# Patient Record
Sex: MALE | Race: BLACK OR AFRICAN AMERICAN | Employment: OTHER | URBAN - METROPOLITAN AREA
[De-identification: names, ages, dates, MRNs, and addresses within clinical notes are randomized per-mention and may not be internally consistent; named-entity substitution may affect disease eponyms.]

---

## 2017-01-30 DIAGNOSIS — E78.00 HYPERCHOLESTEROLEMIA: ICD-10-CM

## 2017-01-30 DIAGNOSIS — I10 ESSENTIAL HYPERTENSION: ICD-10-CM

## 2017-01-30 RX ORDER — ATENOLOL 25 MG/1
TABLET ORAL
Qty: 90 TAB | Refills: 3 | Status: SHIPPED | OUTPATIENT
Start: 2017-01-30 | End: 2017-04-07

## 2017-01-30 RX ORDER — LISINOPRIL AND HYDROCHLOROTHIAZIDE 20; 25 MG/1; MG/1
TABLET ORAL
Qty: 180 TAB | Refills: 3 | Status: SHIPPED | OUTPATIENT
Start: 2017-01-30 | End: 2017-10-02 | Stop reason: SDUPTHER

## 2017-01-30 RX ORDER — ATORVASTATIN CALCIUM 40 MG/1
TABLET, FILM COATED ORAL
Qty: 90 TAB | Refills: 3 | Status: SHIPPED | OUTPATIENT
Start: 2017-01-30 | End: 2017-10-02 | Stop reason: SDUPTHER

## 2017-01-30 RX ORDER — VERAPAMIL HYDROCHLORIDE 240 MG/1
TABLET, FILM COATED, EXTENDED RELEASE ORAL
Qty: 90 TAB | Refills: 3 | Status: SHIPPED | OUTPATIENT
Start: 2017-01-30 | End: 2017-10-02 | Stop reason: SDUPTHER

## 2017-02-01 ENCOUNTER — OFFICE VISIT (OUTPATIENT)
Dept: INTERNAL MEDICINE CLINIC | Age: 72
End: 2017-02-01

## 2017-02-01 VITALS
HEIGHT: 69 IN | DIASTOLIC BLOOD PRESSURE: 82 MMHG | HEART RATE: 93 BPM | SYSTOLIC BLOOD PRESSURE: 126 MMHG | BODY MASS INDEX: 28.94 KG/M2 | RESPIRATION RATE: 20 BRPM | WEIGHT: 195.4 LBS | OXYGEN SATURATION: 97 % | TEMPERATURE: 98 F

## 2017-02-01 DIAGNOSIS — K62.5 BRBPR (BRIGHT RED BLOOD PER RECTUM): Primary | ICD-10-CM

## 2017-02-01 DIAGNOSIS — K64.9 HEMORRHOIDS, UNSPECIFIED HEMORRHOID TYPE: ICD-10-CM

## 2017-02-01 NOTE — MR AVS SNAPSHOT
Visit Information Date & Time Provider Department Dept. Phone Encounter #  
 2/1/2017  2:00 PM Dariela Bartlett, [address] [place] [address],[address] [phone] [id number] Follow-up Instructions Return if symptoms worsen or fail to improve. Follow-up and Disposition History Your Appointments 4/7/2017  2:00 PM  
ROUTINE CARE with Dariela Bartlett MD  
[facility] [address]) Appt Note: 4 month follow up htn  
 [address] [address]  
[address] [facility] [address] [place] [address] Upcoming Health Maintenance Date Due DTaP/Tdap/Td series (1 - Tdap) 12/19/1965 GLAUCOMA SCREENING Q2Y 12/19/2009 Pneumococcal 65+ High/Highest Risk (2 of 2 - PCV13) 3/8/2014 MEDICARE YEARLY EXAM 12/9/2017 COLONOSCOPY 3/28/2022 Allergies as of 2/1/2017  Review Complete On: 2/1/2017 By: Dariela Bartlett MD  
  
 Severity Noted Reaction Type Reactions Zoloft [Sertraline]  10/20/2009    Other (comments) Insomnia Current Immunizations  Reviewed on 10/1/2012 Name Date Pneumococcal Polysaccharide (PPSV-23) 3/8/2013 Not reviewed this visit You Were Diagnosed With   
  
 Codes Comments BRBPR (bright red blood per rectum)    -  Primary ICD-10-CM: K62.5 ICD-9-CM: 569.3 Hemorrhoids, unspecified hemorrhoid type     ICD-10-CM: K64.9 ICD-9-CM: 337. 6 Vitals BP Pulse Temp Resp Height(growth percentile) Weight(growth percentile) 126/82 (BP 1 Location: Left arm, BP Patient Position: Sitting) 93 98 °F (36.7 °C) (Oral) 20 5' 9\" (1.753 m) 195 lb 6.4 oz (88.6 kg) SpO2 BMI Smoking Status 97% 28.86 kg/m2 Passive Smoke Exposure - Never Smoker Vitals History BMI and BSA Data Body Mass Index Body Surface Area  
 28.86 kg/m 2 2.08 m 2 Preferred Pharmacy Pharmacy Name Phone [facility] [place] [facility], [address] [place] [address] [phone] Your Updated Medication List  
  
   
This list is accurate as of: 2/1/17  2:39 PM.  Always use your most recent med list.  
  
  
  
  
 albuterol 90 mcg/actuation inhaler Commonly known as:  PROVENTIL HFA, VENTOLIN HFA, PROAIR HFA Take 1 Puff by inhalation every four (4) hours as needed for Wheezing. aspirin 81 mg tablet Take  by mouth. atenolol 25 mg tablet Commonly known as:  TENORMIN  
TAKE 1 TABLET EVERY DAY  
  
 atorvastatin 40 mg tablet Commonly known as:  LIPITOR  
TAKE 1 TABLET EVERY NIGHT  
  
 bisacodyl 10 mg suppository Commonly known as:  DULCOLAX (BISACODYL) Insert 10 mg into rectum daily as needed (constipation). cyclobenzaprine 10 mg tablet Commonly known as:  FLEXERIL Take 1 Tab by mouth three (3) times daily as needed for Muscle Spasm(s). HYDROcodone-acetaminophen 5-325 mg per tablet Commonly known as:  Rolinda Doles Take 1 Tab by mouth every six (6) hours as needed for Pain.  
  
 hydrocortisone-pramoxine rectal foam  
Commonly known as:  PROCTOFOAM HC Insert 1 Applicator into rectum two (2) times a day. lisinopril-hydroCHLOROthiazide 20-25 mg per tablet Commonly known as:  PRINZIDE, ZESTORETIC  
TAKE 1 TABLET TWICE DAILY  
  
 meclizine 25 mg tablet Commonly known as:  ANTIVERT Take  by mouth three (3) times daily as needed. MULTIVITAMIN PO Take  by mouth. Omeprazole delayed release 20 mg tablet Commonly known as:  PRILOSEC D/R Take 1 Tab by mouth daily. oxyCODONE-acetaminophen 5-325 mg per tablet Commonly known as:  PERCOCET Take 1 Tab by mouth every four (4) hours as needed for Pain. Max Daily Amount: 6 Tabs. pentoxifylline  mg CR tablet Commonly known as:  TRENtal  
Take 1 Tab by mouth three (3) times daily (with meals). senna 8.6 mg tablet Commonly known as:  Senna Take 1 Tab by mouth daily. verapamil  mg CR tablet Commonly known as:  CALAN-SR  
TAKE 1 TABLET EVERY DAY Prescriptions Sent to Pharmacy Refills  
 hydrocortisone-pramoxine (PROCTOFOAM HC) rectal foam 0 Sig: Insert 1 Applicator into rectum two (2) times a day. Class: Normal  
 Pharmacy: [facility] [place] [facility] #: [phone] Route: Rectal  
  
Follow-up Instructions Return if symptoms worsen or fail to improve. Patient Instructions Hemorrhoids: Care Instructions Your Care Instructions Hemorrhoids are enlarged veins that develop in the anal canal. Bleeding during bowel movements, itching, swelling, and rectal pain are the most common symptoms. They can be uncomfortable at times, but hemorrhoids rarely are a serious problem. You can treat most hemorrhoids with simple changes to your diet and bowel habits. These changes include eating more fiber and not straining to pass stools. Most hemorrhoids do not need surgery or other treatment unless they are very large and painful or bleed a lot. Follow-up care is a key part of your treatment and safety. Be sure to make and go to all appointments, and call your doctor if you are having problems. Its also a good idea to know your test results and keep a list of the medicines you take. How can you care for yourself at home? · Sit in a few inches of warm water (sitz bath) 3 times a day and after bowel movements. The warm water helps with pain and itching. · Put ice on your anal area several times a day for 10 minutes at a time. Put a thin cloth between the ice and your skin. Follow this by placing a warm, wet towel on the area for another 10 to 20 minutes. · Take pain medicines exactly as directed. ¨ If the doctor gave you a prescription medicine for pain, take it as prescribed. ¨ If you are not taking a prescription pain medicine, ask your doctor if you can take an over-the-counter medicine. · Keep the anal area clean, but be gentle. Use water and a fragrance-free soap, such as [place], or use baby wipes or medicated pads, such as Tucks. · Wear cotton underwear and loose clothing to decrease moisture in the anal area. · Eat more fiber. Include foods such as whole-grain breads and cereals, raw vegetables, raw and dried fruits, and beans. · Drink plenty of fluids, enough so that your urine is light yellow or clear like water. If you have kidney, heart, or liver disease and have to limit fluids, talk with your doctor before you increase the amount of fluids you drink. · Use a stool softener that contains bran or psyllium. You can save money by buying bran or psyllium (available in bulk at most health food stores) and sprinkling it on foods or stirring it into fruit juice. Or you can use a product such as Metamucil or Hydrocil. · Practice healthy bowel habits. ¨ Go to the bathroom as soon as you have the urge. ¨ Avoid straining to pass stools. Relax and give yourself time to let things happen naturally. ¨ Do not hold your breath while passing stools. ¨ Do not read while sitting on the toilet. Get off the toilet as soon as you have finished. · Take your medicines exactly as prescribed. Call your doctor if you think you are having a problem with your medicine. When should you call for help? Call 911 anytime you think you may need emergency care. For example, call if: 
· You pass maroon or very bloody stools. Call your doctor now or seek immediate medical care if: 
· You have increased pain. · You have increased bleeding. Watch closely for changes in your health, and be sure to contact your doctor if: 
· Your symptoms have not improved after 3 or 4 days. Where can you learn more? Go to http://main.info/. Enter F228 in the search box to learn more about \"Hemorrhoids: Care Instructions. \" Current as of: August 9, 2016 Content Version: 11.1 © 5551-4427 [employer]. Care instructions adapted under license by [employer] (which disclaims liability or warranty for this information). If you have questions about a medical condition or this instruction, always ask your healthcare professional. [address] any warranty or liability for your use of this information. Introducing [facility]! Dear [place]: Thank you for requesting a RentMatch account. Our records indicate that you already have an active [employer]Match account. You can access your account anytime at [email or web address] Did you know that you can access your hospital and ER discharge instructions at any time in RentMatch? You can also review all of your test results from your hospital stay or ER visit. Additional Information If you have questions, please visit the Frequently Asked Questions section of the RentMatch website at [email or web address] Remember, RentMatch is NOT to be used for urgent needs. For medical emergencies, dial 911. Now available from your iPhone and Android! Please provide this summary of care documentation to your next provider. Your primary care clinician is listed as [place]. If you have any questions after today's visit, please call [phone].

## 2017-02-01 NOTE — PROGRESS NOTES
SUBJECTIVE  Mr. Robyn Martin presents today acutely for     Chief Complaint   Patient presents with    Anal Bleeding     pt here today concerned of blood in stool x 1 week; pt c/o tenderness in stomach       He has bright red blood per rectum. No black or maroon stools, fatigue, LH, etc.   He has a history of hemorrhoid. He's recently had constipation, preceding this. \"And I felt a little something hanging out back there. \"     OBJECTIVE  Visit Vitals    /82 (BP 1 Location: Left arm, BP Patient Position: Sitting)    Pulse 93    Temp 98 °F (36.7 °C) (Oral)    Resp 20    Ht 5' 9\" (1.753 m)    Wt 195 lb 6.4 oz (88.6 kg)    SpO2 97%    BMI 28.86 kg/m2     Gen: Pleasant 66 y.o.  male in NAD.     ABDOMEN: S, +slight umbilical tenderness. Has old scar there from prostatectomy, ND, BS+.   Rectal: Hemorrhoid noted at about 6 o'clock. SKIN: Warm. Dry. No rashes or other lesions noted. ASSESSMENT / PLAN    ICD-10-CM ICD-9-CM    1. BRBPR (bright red blood per rectum) K62.5 569.3    2. Hemorrhoids, unspecified hemorrhoid type K64.9 455.6      Discussed self care. Rx for proctofoam HC. I have reviewed with the patient details of the assessment and plan and all questions were answered. Relevant patient education was performed. Follow-up Disposition:  Return if symptoms worsen or fail to improve.

## 2017-02-01 NOTE — PROGRESS NOTES
1. Have you been to the ER, urgent care clinic since your last visit? Hospitalized since your last visit?no    2. Have you seen or consulted any other health care providers outside of the [address] [place] [address] since your last visit? Include any pap smears or colon screening.  no

## 2017-02-01 NOTE — PATIENT INSTRUCTIONS

## 2017-02-02 ENCOUNTER — TELEPHONE (OUTPATIENT)
Dept: INTERNAL MEDICINE CLINIC | Age: 72
End: 2017-02-02

## 2017-02-02 NOTE — TELEPHONE ENCOUNTER
Suzette Sena called and states that she is needing a call back in regards to if another medication will be able to be called in for the pt today. Please call Suzette Sena.

## 2017-02-02 NOTE — TELEPHONE ENCOUNTER
[facility] pharmacy fax our office requesting a drug change, stating that proctofoam HC 1-1% aero foam 10 gm is not covered; plan alternative include proctozone HC cream; pls escribe if approved.

## 2017-02-03 RX ORDER — HYDROCORTISONE ACETATE, PRAMOXINE HCL 2.5; 1 G/100G; G/100G
CREAM TOPICAL 3 TIMES DAILY
Qty: 10 G | Refills: 0 | Status: SHIPPED | OUTPATIENT
Start: 2017-02-03 | End: 2017-04-07

## 2017-03-31 DIAGNOSIS — I73.9 CLAUDICATION (HCC): ICD-10-CM

## 2017-03-31 RX ORDER — PENTOXIFYLLINE 400 MG/1
TABLET, EXTENDED RELEASE ORAL
Qty: 270 TAB | Refills: 2 | Status: SHIPPED | OUTPATIENT
Start: 2017-03-31 | End: 2017-10-02 | Stop reason: SDUPTHER

## 2017-04-05 LAB
ALBUMIN SERPL-MCNC: 4 G/DL (ref 3.5–4.8)
ALBUMIN/GLOB SERPL: 1.9 {RATIO} (ref 1.2–2.2)
ALP SERPL-CCNC: 52 IU/L (ref 39–117)
ALT SERPL-CCNC: 18 IU/L (ref 0–44)
AST SERPL-CCNC: 14 IU/L (ref 0–40)
BASOPHILS # BLD AUTO: 0.1 X10E3/UL (ref 0–0.2)
BASOPHILS NFR BLD AUTO: 1 %
BILIRUB SERPL-MCNC: 0.5 MG/DL (ref 0–1.2)
BUN SERPL-MCNC: 23 MG/DL (ref 8–27)
BUN/CREAT SERPL: 17 (ref 10–24)
CALCIUM SERPL-MCNC: 9.2 MG/DL (ref 8.6–10.2)
CHLORIDE SERPL-SCNC: 99 MMOL/L (ref 96–106)
CHOLEST SERPL-MCNC: 160 MG/DL (ref 100–199)
CO2 SERPL-SCNC: 23 MMOL/L (ref 18–29)
CREAT SERPL-MCNC: 1.32 MG/DL (ref 0.76–1.27)
EOSINOPHIL # BLD AUTO: 0.4 X10E3/UL (ref 0–0.4)
EOSINOPHIL NFR BLD AUTO: 6 %
ERYTHROCYTE [DISTWIDTH] IN BLOOD BY AUTOMATED COUNT: 14.8 % (ref 12.3–15.4)
EST. AVERAGE GLUCOSE BLD GHB EST-MCNC: 131 MG/DL
GLOBULIN SER CALC-MCNC: 2.1 G/DL (ref 1.5–4.5)
GLUCOSE SERPL-MCNC: 93 MG/DL (ref 65–99)
HBA1C MFR BLD: 6.2 % (ref 4.8–5.6)
HCT VFR BLD AUTO: 40.4 % (ref 37.5–51)
HDLC SERPL-MCNC: 54 MG/DL
HGB BLD-MCNC: 13.5 G/DL (ref 12.6–17.7)
IMM GRANULOCYTES # BLD: 0 X10E3/UL (ref 0–0.1)
IMM GRANULOCYTES NFR BLD: 0 %
LDLC SERPL CALC-MCNC: 85 MG/DL (ref 0–99)
LYMPHOCYTES # BLD AUTO: 2.1 X10E3/UL (ref 0.7–3.1)
LYMPHOCYTES NFR BLD AUTO: 32 %
MCH RBC QN AUTO: 30.1 PG (ref 26.6–33)
MCHC RBC AUTO-ENTMCNC: 33.4 G/DL (ref 31.5–35.7)
MCV RBC AUTO: 90 FL (ref 79–97)
MONOCYTES # BLD AUTO: 0.7 X10E3/UL (ref 0.1–0.9)
MONOCYTES NFR BLD AUTO: 10 %
MORPHOLOGY BLD-IMP: ABNORMAL
NEUTROPHILS # BLD AUTO: 3.3 X10E3/UL (ref 1.4–7)
NEUTROPHILS NFR BLD AUTO: 51 %
PLATELET # BLD AUTO: 59 X10E3/UL (ref 150–379)
POTASSIUM SERPL-SCNC: 4.3 MMOL/L (ref 3.5–5.2)
PROT SERPL-MCNC: 6.1 G/DL (ref 6–8.5)
PSA SERPL-MCNC: <0.1 NG/ML (ref 0–4)
RBC # BLD AUTO: 4.49 X10E6/UL (ref 4.14–5.8)
SODIUM SERPL-SCNC: 139 MMOL/L (ref 134–144)
TRIGL SERPL-MCNC: 104 MG/DL (ref 0–149)
VLDLC SERPL CALC-MCNC: 21 MG/DL (ref 5–40)
WBC # BLD AUTO: 6.5 X10E3/UL (ref 3.4–10.8)

## 2017-04-07 ENCOUNTER — OFFICE VISIT (OUTPATIENT)
Dept: INTERNAL MEDICINE CLINIC | Age: 72
End: 2017-04-07

## 2017-04-07 VITALS
TEMPERATURE: 98 F | WEIGHT: 191.2 LBS | RESPIRATION RATE: 20 BRPM | OXYGEN SATURATION: 96 % | BODY MASS INDEX: 28.32 KG/M2 | SYSTOLIC BLOOD PRESSURE: 124 MMHG | HEIGHT: 69 IN | DIASTOLIC BLOOD PRESSURE: 60 MMHG | HEART RATE: 108 BPM

## 2017-04-07 DIAGNOSIS — D69.6 THROMBOCYTOPENIA (HCC): ICD-10-CM

## 2017-04-07 DIAGNOSIS — I10 ESSENTIAL HYPERTENSION: Primary | ICD-10-CM

## 2017-04-07 DIAGNOSIS — R73.01 IMPAIRED FASTING GLUCOSE: ICD-10-CM

## 2017-04-07 DIAGNOSIS — C61 PROSTATE CANCER (HCC): ICD-10-CM

## 2017-04-07 DIAGNOSIS — E78.00 HYPERCHOLESTEROLEMIA: ICD-10-CM

## 2017-04-07 DIAGNOSIS — J01.90 ACUTE NON-RECURRENT SINUSITIS, UNSPECIFIED LOCATION: ICD-10-CM

## 2017-04-07 RX ORDER — AMOXICILLIN AND CLAVULANATE POTASSIUM 875; 125 MG/1; MG/1
1 TABLET, FILM COATED ORAL EVERY 12 HOURS
Qty: 14 TAB | Refills: 0 | Status: SHIPPED | OUTPATIENT
Start: 2017-04-07 | End: 2017-04-14

## 2017-04-07 NOTE — PROGRESS NOTES
SUBJECTIVE:   Mr. Xochitl Min is a 66 y.o. male who is here for follow up of routine medical issues. Chief Complaint   Patient presents with    Hypertension    Follow-up     pt here today for 4 month f.u    URI     pt c/o pain in L ear and throat x 4 days       His knee is doing fine. He is s/p TKR L knee by Dr. Jesusita Dakin in October. He finished radiation therapy. Seeing Dr. Lindsay Berkowitz for prostate cancer. His rhinitis has been controlled. Zyrtec + mucinex. flonase at night sometimes. Smoking: He has quit. Unfortunately his wife is still smoking. He has had normal colonoscopy with Dr. Moreno Shearer. Constipation is ongoing but better. We mentioned before: He will go once every 5 days, and \"it's a hard stool\", unless he takes senna. \"Is there anything different? \" Takes fiber and drinks a lot of water. Back pain comes and goes--doing okay lately. Dates it to when a [place] door fell on his back in 2000. At this time, he is otherwise doing well and has brought no other complaints to my attention today. For a list of the medical issues addressed today, see the assessment and plan below. PMH:   Past Medical History:   Diagnosis Date    Claudication [facility])     Constipation 10/22/2012    Glaucoma     Hypercholesterolemia     Hypertension     Pancreatitis 10/2012    Prostate cancer [facility])     s/p prostatectomy    Thrombocytopenia ([facility] [address] 7/27/2010       PSH: He has a past surgical history that includes prostatectomy (2005); endoscopy, colon, diagnostic (1999); orthopaedic; and other surgical.  Colonoscopy 2012, by Dr. Moreno Shearer at [facility]--one polyp; Normal in 2017. All: He is allergic to zoloft [sertraline]. MEDS:   Current Outpatient Prescriptions   Medication Sig    PSYLLIUM SEED, WITH SUGAR, (METAMUCIL, SUGAR, PO) Take  by mouth.     pentoxifylline CR (TRENTAL) 400 mg CR tablet TAKE 1 TABLET THREE TIMES DAILY WITH MEALS    atorvastatin (LIPITOR) 40 mg tablet TAKE 1 TABLET EVERY NIGHT  lisinopril-hydroCHLOROthiazide (PRINZIDE, ZESTORETIC) 20-25 mg per tablet TAKE 1 TABLET TWICE DAILY    verapamil ER (CALAN-SR) 240 mg CR tablet TAKE 1 TABLET EVERY DAY    oxyCODONE-acetaminophen (PERCOCET) 5-325 mg per tablet Take 1 Tab by mouth every four (4) hours as needed for Pain. Max Daily Amount: 6 Tabs.  senna (SENNA) 8.6 mg tablet Take 1 Tab by mouth daily.  HYDROcodone-acetaminophen (NORCO) 5-325 mg per tablet Take 1 Tab by mouth every six (6) hours as needed for Pain.  Omeprazole delayed release (PRILOSEC D/R) 20 mg tablet Take 1 Tab by mouth daily.  aspirin 81 mg Tab Take  by mouth.  MULTIVITAMINS (MULTIVITAMIN PO) Take  by mouth.  pramoxine-hydrocortisone (PROTOFOAM-HC) 2.5-1 % topical cream Apply  to affected area three (3) times daily.  atenolol (TENORMIN) 25 mg tablet TAKE 1 TABLET EVERY DAY    albuterol (PROVENTIL HFA, VENTOLIN HFA, PROAIR HFA) 90 mcg/actuation inhaler Take 1 Puff by inhalation every four (4) hours as needed for Wheezing.  cyclobenzaprine (FLEXERIL) 10 mg tablet Take 1 Tab by mouth three (3) times daily as needed for Muscle Spasm(s).  meclizine (ANTIVERT) 25 mg tablet Take  by mouth three (3) times daily as needed.  bisacodyl (DULCOLAX, BISACODYL,) 10 mg suppository Insert 10 mg into rectum daily as needed (constipation). No current facility-administered medications for this visit. FH: His family history includes Cancer in his mother; Heart Disease in his father; Hypertension in his brother, brother, and mother. SH: He is retired  for [employer]. He has quit cigarettes in 2013--resumed--trying to wean down. He has a 25 pack-year smoking history. He does not have any smokeless tobacco history on file. He reports that he drinks alcohol. ROS: See above; Complete ROS otherwise negative.      OBJECTIVE:   Vitals:   Visit Vitals    /60 (BP 1 Location: Left arm, BP Patient Position: Sitting)    Pulse (!) 108    Temp 98 °F (36.7 °C) (Oral)    Resp 20    Ht 5' 9\" (1.753 m)    Wt 191 lb 3.2 oz (86.7 kg)    SpO2 96%    BMI 28.24 kg/m2     Gen: Pleasant 66 y.o. AA male in NAD. HEENT: PERRLA. EOMI. OP moist and pink. TM pearly. Neck: Supple. No LAD. HEART: RRR, No M/G/R.    LUNGS: CTAB No W/R. ABDOMEN: S, NT, ND, BS+. EXTREMITIES: Warm. No C/C/E.  MUSCULOSKELETAL:  Soreness over R achilles tendon. Normal ROM, muscle strength 5/5 all groups. NEURO: Alert and oriented x 3. Cranial nerves grossly intact. No focal sensory or motor deficits noted. SKIN: Warm. Dry. No rashes or other lesions noted. Lab Results   Component Value Date/Time    Sodium 139 04/04/2017 12:00 AM    Potassium 4.3 04/04/2017 12:00 AM    Chloride 99 04/04/2017 12:00 AM    CO2 23 04/04/2017 12:00 AM    Anion gap 5 10/03/2012 04:53 AM    Glucose 93 04/04/2017 12:00 AM    BUN 23 04/04/2017 12:00 AM    Creatinine 1.32 04/04/2017 12:00 AM    BUN/Creatinine ratio 17 04/04/2017 12:00 AM    GFR est AA 62 04/04/2017 12:00 AM    GFR est non-AA 54 04/04/2017 12:00 AM    Calcium 9.2 04/04/2017 12:00 AM    AST (SGOT) 14 04/04/2017 12:00 AM    Alk. phosphatase 52 04/04/2017 12:00 AM    Protein, total 6.1 04/04/2017 12:00 AM    Albumin 4.0 04/04/2017 12:00 AM    Globulin 3.1 10/03/2012 04:53 AM    A-G Ratio 1.9 04/04/2017 12:00 AM    ALT (SGPT) 18 04/04/2017 12:00 AM       Lab Results   Component Value Date/Time    Cholesterol, total 160 04/04/2017 12:00 AM    HDL Cholesterol 54 04/04/2017 12:00 AM    LDL, calculated 85 04/04/2017 12:00 AM    Triglyceride 104 04/04/2017 12:00 AM    CHOL/HDL Ratio 3.6 10/01/2012 06:45 PM       Lab Results   Component Value Date/Time    WBC 6.5 04/04/2017 12:00 AM    HGB 13.5 04/04/2017 12:00 AM    HCT 40.4 04/04/2017 12:00 AM    PLATELET 59 90/97/4691 12:00 AM    MCV 90 04/04/2017 12:00 AM       ASSESSMENT/ PLAN:   1. ?Sinusitis: Pain referred into throat, and L ear. 2. Hypertension: Fine today.    - lisinopril-hydrochlorothiazide (PRINZIDE, ZESTORETIC) 20-25 mg per tablet; Take 2 Tab by mouth daily. - atenolol (TENORMIN) 25 mg tablet; Take 1 Tab by mouth daily. - verapamil SR (CALAN-SR) 240 mg CR tablet; Take 1 Tab by mouth daily.  - METABOLIC PANEL, COMPREHENSIVE  3. Hyperglycemia: Borderline diabetic. Diet and exercise. 4. Claudication / Peripheral Vascular Disease: Stable. - pentoxifylline CR (TRENTAL) 400 mg CR tablet; Take 1 Tab by mouth three (3) times daily (with meals). 5. Hypercholesterolemia: OK for now. - For now, continue lipitor 40. Watch diet and exercise.  - LIPID PANEL  - METABOLIC PANEL, COMPREHENSIVE  6. Thrombocytopenia:  Stable. We discussed meds, and we might stop the trental for a bit and see if this makes any difference (hasn't tried this yet). Has been seen before by Dr. Kathy Garcia. Return to him if gets any lower. - CBC WITH AUTOMATED DIFF  7. Prostate cancer: Follow with urology, Dr. Nohemi Olivia. \"My PSA is going up again. \"  He is s/p prostatectomy. 8. Constipation: Doing fine now. 9. Rhinitis: Recommended antihistamine. 10. Erectile Dysfunction: Doing fine for now. Gets mild HA with cialis--can continue this. Viagra didn't work as well. 11. Smoking: Congratulated on quitting (has a cigarette \"once in a while,\" but has mostly quit). Follow-up Disposition:  Return in about 4 months (around 8/7/2017) for HTN. Declines flu shot.

## 2017-04-07 NOTE — PROGRESS NOTES
1. Have you been to the ER, urgent care clinic since your last visit? Hospitalized since your last visit? NO  2. Have you seen or consulted any other health care providers outside of the [address] [place] [address] since your last visit? Include any pap smears or colon screening.  NO

## 2017-04-07 NOTE — MR AVS SNAPSHOT
Visit Information Date & Time Provider Department Dept. Phone Encounter #  
 4/7/2017  2:00 PM Chayo Dumas, [address] [phone] [id number] Follow-up Instructions Return in about 4 months (around 8/7/2017) for HTN. Follow-up and Disposition History Upcoming Health Maintenance Date Due DTaP/Tdap/Td series (1 - Tdap) 12/19/1965 GLAUCOMA SCREENING Q2Y 12/19/2009 Pneumococcal 65+ High/Highest Risk (2 of 2 - PCV13) 3/8/2014 MEDICARE YEARLY EXAM 12/9/2017 COLONOSCOPY 3/28/2022 Allergies as of 4/7/2017  Review Complete On: 4/7/2017 By: Jim Vargas Severity Noted Reaction Type Reactions Zoloft [Sertraline]  10/20/2009    Other (comments) Insomnia Current Immunizations  Reviewed on 10/1/2012 Name Date Pneumococcal Polysaccharide (PPSV-23) 3/8/2013 Not reviewed this visit You Were Diagnosed With   
  
 Codes Comments Essential hypertension    -  Primary ICD-10-CM: I10 
ICD-9-CM: 401.9 Hypercholesterolemia     ICD-10-CM: E78.00 ICD-9-CM: 272.0 Impaired fasting glucose     ICD-10-CM: R73.01 
ICD-9-CM: 790.21 Prostate cancer [facility])     ICD-10-CM: N26 ICD-9-CM: 165 [facility]     ICD-10-CM: D69.6 ICD-9-CM: 287.5 Acute non-recurrent sinusitis, unspecified location     ICD-10-CM: J01.90 ICD-9-CM: 461.9 Vitals BP Pulse Temp Resp Height(growth percentile) Weight(growth percentile) 124/60 (BP 1 Location: Left arm, BP Patient Position: Sitting) (!) 108 98 °F (36.7 °C) (Oral) 20 5' 9\" (1.753 m) 191 lb 3.2 oz (86.7 kg) SpO2 BMI Smoking Status 96% 28.24 kg/m2 Passive Smoke Exposure - Never Smoker Vitals History BMI and BSA Data Body Mass Index Body Surface Area  
 28.24 kg/m 2 2.05 m 2 Preferred Pharmacy Pharmacy Name Phone  [address] [facility] [address] [phone] Your Updated Medication List  
  
   
This list is accurate as of: 4/7/17  2:22 PM.  Always use your most recent med list.  
  
  
  
  
 amoxicillin-clavulanate 875-125 mg per tablet Commonly known as:  AUGMENTIN Take 1 Tab by mouth every twelve (12) hours for 7 days. aspirin 81 mg tablet Take  by mouth. atorvastatin 40 mg tablet Commonly known as:  LIPITOR  
TAKE 1 TABLET EVERY NIGHT HYDROcodone-acetaminophen 5-325 mg per tablet Commonly known as:  Milinda Copier Take 1 Tab by mouth every six (6) hours as needed for Pain. lisinopril-hydroCHLOROthiazide 20-25 mg per tablet Commonly known as:  PRINZIDE, ZESTORETIC  
TAKE 1 TABLET TWICE DAILY METAMUCIL (SUGAR) PO Take  by mouth. MULTIVITAMIN PO Take  by mouth. Omeprazole delayed release 20 mg tablet Commonly known as:  PRILOSEC D/R Take 1 Tab by mouth daily. oxyCODONE-acetaminophen 5-325 mg per tablet Commonly known as:  PERCOCET Take 1 Tab by mouth every four (4) hours as needed for Pain. Max Daily Amount: 6 Tabs. pentoxifylline  mg CR tablet Commonly known as:  TRENTAL TAKE 1 TABLET THREE TIMES DAILY WITH MEALS  
  
 senna 8.6 mg tablet Commonly known as:  Senna Take 1 Tab by mouth daily. verapamil  mg CR tablet Commonly known as:  CALAN-SR  
TAKE 1 TABLET EVERY DAY Prescriptions Sent to Pharmacy Refills  
 amoxicillin-clavulanate (AUGMENTIN) 875-125 mg per tablet 0 Sig: Take 1 Tab by mouth every twelve (12) hours for 7 days. Class: Normal  
 Pharmacy: [facility] [address] #: [phone] Route: Oral  
  
Follow-up Instructions Return in about 4 months (around 8/7/2017) for HTN. To-Do List   
 07/06/2017 Lab:  CBC WITH AUTOMATED DIFF   
  
 07/06/2017   Lab:  HEMOGLOBIN A1C WITH EAG   
  
 07/06/2017 Lab:  LIPID PANEL   
  
 07/06/2017 Lab:  METABOLIC PANEL, COMPREHENSIVE [facility]! Dear [place]: Thank you for requesting a AppLayer account. Our records indicate that you already have an active AppLayer account. You can access your account anytime at [email or web address] Did you know that you can access your hospital and ER discharge instructions at any time in AppLayer? You can also review all of your test results from your hospital stay or ER visit. Additional Information If you have questions, please visit the Frequently Asked Questions section of the AppLayer website at [email or web address] Remember, AppLayer is NOT to be used for urgent needs. For medical emergencies, dial 911. Now available from your iPhone and Android! Please provide this summary of care documentation to your next provider. Your primary care clinician is listed as [place]. If you have any questions after today's visit, please call [phone].

## 2017-04-10 ENCOUNTER — TELEPHONE (OUTPATIENT)
Dept: INTERNAL MEDICINE CLINIC | Age: 72
End: 2017-04-10

## 2017-04-10 NOTE — TELEPHONE ENCOUNTER
Pt only took two pills from Friday. It bothers pt when he walks and stomach looks swollen. Should he go to the ED? The medication is bothering him. Not throwing up.

## 2017-04-10 NOTE — TELEPHONE ENCOUNTER
Call to pt, ID verified x2. Pt states he took two tablets of abx, experiencing abdominal cramping/discomfort. Pt states he call on call provider who suggested d/c antibiotic and go to ED if he experience fever, N/V. Pt states he has not had any of these symptoms and the cramping improving. Advised pt to eat a bland diet for a day or two, avoid spicy/greasy foods. PT would like to know if there is anything he can take OTC for intermittent abdominal cramping. Sent to provider for review.

## 2017-04-11 NOTE — TELEPHONE ENCOUNTER
Call completed to Maira Gutiérrez, two patient identifiers verified. Patient advised of Dr. Bright Notice recommendations. Crissy Olson reports the patient was seen and treated at [facility] on 04/10/2016.  Patient now needs a referral to see at Charito Nicolas MD, [facility]  [address], [facility], [address] Phone: [phone] Fax: [phone]

## 2017-04-11 NOTE — TELEPHONE ENCOUNTER
He will need to be seen. I don't have any openings that I know of this week since I am covering several other physicians. If this is urgent he may need to go to urgent care.

## 2017-04-12 ENCOUNTER — TELEPHONE (OUTPATIENT)
Dept: INTERNAL MEDICINE CLINIC | Age: 72
End: 2017-04-12

## 2017-04-12 NOTE — TELEPHONE ENCOUNTER
Patients referral has been obtained through [facility] and faxed over to Dr. Chai Diallo office on behalf of the patient.

## 2017-08-14 LAB
ALBUMIN SERPL-MCNC: 4.2 G/DL (ref 3.5–4.8)
ALBUMIN/GLOB SERPL: 2.1 {RATIO} (ref 1.2–2.2)
ALP SERPL-CCNC: 59 IU/L (ref 39–117)
ALT SERPL-CCNC: 22 IU/L (ref 0–44)
AST SERPL-CCNC: 14 IU/L (ref 0–40)
BASOPHILS # BLD AUTO: 0.1 X10E3/UL (ref 0–0.2)
BASOPHILS NFR BLD AUTO: 1 %
BILIRUB SERPL-MCNC: 0.4 MG/DL (ref 0–1.2)
BUN SERPL-MCNC: 27 MG/DL (ref 8–27)
BUN/CREAT SERPL: 19 (ref 10–24)
CALCIUM SERPL-MCNC: 9.4 MG/DL (ref 8.6–10.2)
CHLORIDE SERPL-SCNC: 99 MMOL/L (ref 96–106)
CHOLEST SERPL-MCNC: 172 MG/DL (ref 100–199)
CO2 SERPL-SCNC: 21 MMOL/L (ref 18–29)
CREAT SERPL-MCNC: 1.41 MG/DL (ref 0.76–1.27)
EOSINOPHIL # BLD AUTO: 0.4 X10E3/UL (ref 0–0.4)
EOSINOPHIL NFR BLD AUTO: 6 %
ERYTHROCYTE [DISTWIDTH] IN BLOOD BY AUTOMATED COUNT: 14.4 % (ref 12.3–15.4)
EST. AVERAGE GLUCOSE BLD GHB EST-MCNC: 128 MG/DL
GLOBULIN SER CALC-MCNC: 2 G/DL (ref 1.5–4.5)
GLUCOSE SERPL-MCNC: 101 MG/DL (ref 65–99)
HBA1C MFR BLD: 6.1 % (ref 4.8–5.6)
HCT VFR BLD AUTO: 42.3 % (ref 37.5–51)
HDLC SERPL-MCNC: 58 MG/DL
HGB BLD-MCNC: 13.8 G/DL (ref 12.6–17.7)
IMM GRANULOCYTES # BLD: 0 X10E3/UL (ref 0–0.1)
IMM GRANULOCYTES NFR BLD: 0 %
LDLC SERPL CALC-MCNC: 90 MG/DL (ref 0–99)
LYMPHOCYTES # BLD AUTO: 2 X10E3/UL (ref 0.7–3.1)
LYMPHOCYTES NFR BLD AUTO: 34 %
MCH RBC QN AUTO: 29.6 PG (ref 26.6–33)
MCHC RBC AUTO-ENTMCNC: 32.6 G/DL (ref 31.5–35.7)
MCV RBC AUTO: 91 FL (ref 79–97)
MONOCYTES # BLD AUTO: 0.6 X10E3/UL (ref 0.1–0.9)
MONOCYTES NFR BLD AUTO: 9 %
MORPHOLOGY BLD-IMP: ABNORMAL
NEUTROPHILS # BLD AUTO: 2.9 X10E3/UL (ref 1.4–7)
NEUTROPHILS NFR BLD AUTO: 50 %
PLATELET # BLD AUTO: 60 X10E3/UL (ref 150–379)
POTASSIUM SERPL-SCNC: 4.3 MMOL/L (ref 3.5–5.2)
PROT SERPL-MCNC: 6.2 G/DL (ref 6–8.5)
RBC # BLD AUTO: 4.66 X10E6/UL (ref 4.14–5.8)
SODIUM SERPL-SCNC: 138 MMOL/L (ref 134–144)
TRIGL SERPL-MCNC: 120 MG/DL (ref 0–149)
VLDLC SERPL CALC-MCNC: 24 MG/DL (ref 5–40)
WBC # BLD AUTO: 6 X10E3/UL (ref 3.4–10.8)

## 2017-08-16 ENCOUNTER — OFFICE VISIT (OUTPATIENT)
Dept: INTERNAL MEDICINE CLINIC | Age: 72
End: 2017-08-16

## 2017-08-16 VITALS
DIASTOLIC BLOOD PRESSURE: 67 MMHG | OXYGEN SATURATION: 99 % | HEIGHT: 69 IN | SYSTOLIC BLOOD PRESSURE: 129 MMHG | WEIGHT: 200.2 LBS | RESPIRATION RATE: 16 BRPM | HEART RATE: 97 BPM | BODY MASS INDEX: 29.65 KG/M2 | TEMPERATURE: 98.4 F

## 2017-08-16 DIAGNOSIS — I10 ESSENTIAL HYPERTENSION: Primary | ICD-10-CM

## 2017-08-16 DIAGNOSIS — E78.00 HYPERCHOLESTEROLEMIA: ICD-10-CM

## 2017-08-16 DIAGNOSIS — D69.6 THROMBOCYTOPENIA (HCC): ICD-10-CM

## 2017-08-16 DIAGNOSIS — R73.01 IMPAIRED FASTING GLUCOSE: ICD-10-CM

## 2017-08-16 NOTE — PROGRESS NOTES
SUBJECTIVE:   Mr. Donnell Villareal is a 66 y.o. male who is here for follow up of routine medical issues. Chief Complaint   Patient presents with    Hypertension    Follow-up     pt here today for 4 month f.u       \"I have a little drainage\". His rhinitis has otherwise been controlled. Zyrtec + mucinex \"as needed. \"  Flonase \"made my nose bleed, so I stopped that. \"   Smoking: He has 2-3 cigs / day. Unfortunately his wife is still smoking. He has had normal colonoscopy with Dr. Elda Dan. Constipation is ongoing but better. We mentioned before: He will go once every 5 days, and \"it's a hard stool\", unless he takes senna. \"Is there anything different? \" Takes fiber and drinks a lot of water. He finished radiation therapy. Seeing Dr. Evelina Jackson for prostate cancer. Back pain comes and goes--doing okay lately. Dates it to when a Shelbi Rubins door fell on his back in 2000. At this time, he is otherwise doing well and has brought no other complaints to my attention today. For a list of the medical issues addressed today, see the assessment and plan below. PMH:   Past Medical History:   Diagnosis Date    Claudication [facility])     Constipation 10/22/2012    Glaucoma     Hypercholesterolemia     Hypertension     Pancreatitis 10/2012    Prostate cancer [facility])     s/p prostatectomy    Thrombocytopenia ([facility] [address] 7/27/2010       PSH: He has a past surgical history that includes prostatectomy (2005); endoscopy, colon, diagnostic (1999); orthopaedic; other surgical; and hernia repair (04/2017). Colonoscopy 2012, by Dr. Elda Dan at [facility]--one polyp; Normal in 2017. All: He is allergic to zoloft [sertraline]. MEDS:   Current Outpatient Prescriptions   Medication Sig    PSYLLIUM SEED, WITH SUGAR, (METAMUCIL, SUGAR, PO) Take  by mouth.     pentoxifylline CR (TRENTAL) 400 mg CR tablet TAKE 1 TABLET THREE TIMES DAILY WITH MEALS    atorvastatin (LIPITOR) 40 mg tablet TAKE 1 TABLET EVERY NIGHT    lisinopril-hydroCHLOROthiazide (PRINZIDE, ZESTORETIC) 20-25 mg per tablet TAKE 1 TABLET TWICE DAILY    verapamil ER (CALAN-SR) 240 mg CR tablet TAKE 1 TABLET EVERY DAY    oxyCODONE-acetaminophen (PERCOCET) 5-325 mg per tablet Take 1 Tab by mouth every four (4) hours as needed for Pain. Max Daily Amount: 6 Tabs.  senna (SENNA) 8.6 mg tablet Take 1 Tab by mouth daily.  HYDROcodone-acetaminophen (NORCO) 5-325 mg per tablet Take 1 Tab by mouth every six (6) hours as needed for Pain.  Omeprazole delayed release (PRILOSEC D/R) 20 mg tablet Take 1 Tab by mouth daily.  aspirin 81 mg Tab Take  by mouth.  MULTIVITAMINS (MULTIVITAMIN PO) Take  by mouth. No current facility-administered medications for this visit. FH: His family history includes Cancer in his mother; Heart Disease in his father; Hypertension in his brother, brother, and mother. SH: He is retired  for [employer]. He has quit cigarettes in 2013--resumed--trying to wean down. He has a 25 pack-year smoking history. He does not have any smokeless tobacco history on file. He reports that he drinks alcohol. ROS: See above; Complete ROS otherwise negative. OBJECTIVE:   Vitals:   Visit Vitals    /67 (BP 1 Location: Left arm, BP Patient Position: Sitting)    Pulse 97    Temp 98.4 °F (36.9 °C) (Oral)    Resp 16    Ht 5' 9\" (1.753 m)    Wt 200 lb 3.2 oz (90.8 kg)    SpO2 99%    BMI 29.56 kg/m2     Gen: Pleasant 66 y.o. AA male in NAD. HEENT: PERRLA. EOMI. OP moist and pink. TM pearly. Neck: Supple. No LAD. HEART: RRR, No M/G/R.    LUNGS: CTAB No W/R. ABDOMEN: S, NT, ND, BS+. EXTREMITIES: Warm. No C/C/E.  MUSCULOSKELETAL:  Soreness over R achilles tendon. Normal ROM, muscle strength 5/5 all groups. NEURO: Alert and oriented x 3. Cranial nerves grossly intact. No focal sensory or motor deficits noted. SKIN: Warm. Dry. No rashes or other lesions noted.     Lab Results   Component Value Date/Time    Sodium 138 08/12/2017 08:46 AM    Potassium 4.3 08/12/2017 08:46 AM    Chloride 99 08/12/2017 08:46 AM    CO2 21 08/12/2017 08:46 AM    Anion gap 5 10/03/2012 04:53 AM    Glucose 101 08/12/2017 08:46 AM    BUN 27 08/12/2017 08:46 AM    Creatinine 1.41 08/12/2017 08:46 AM    BUN/Creatinine ratio 19 08/12/2017 08:46 AM    GFR est AA 57 08/12/2017 08:46 AM    GFR est non-AA 49 08/12/2017 08:46 AM    Calcium 9.4 08/12/2017 08:46 AM    AST (SGOT) 14 08/12/2017 08:46 AM    Alk. phosphatase 59 08/12/2017 08:46 AM    Protein, total 6.2 08/12/2017 08:46 AM    Albumin 4.2 08/12/2017 08:46 AM    Globulin 3.1 10/03/2012 04:53 AM    A-G Ratio 2.1 08/12/2017 08:46 AM    ALT (SGPT) 22 08/12/2017 08:46 AM       Lab Results   Component Value Date/Time    Cholesterol, total 172 08/12/2017 08:46 AM    HDL Cholesterol 58 08/12/2017 08:46 AM    LDL, calculated 90 08/12/2017 08:46 AM    Triglyceride 120 08/12/2017 08:46 AM    CHOL/HDL Ratio 3.6 10/01/2012 06:45 PM       Lab Results   Component Value Date/Time    WBC 6.0 08/12/2017 08:46 AM    HGB 13.8 08/12/2017 08:46 AM    HCT 42.3 08/12/2017 08:46 AM    PLATELET 60 52/25/4955 08:46 AM    MCV 91 08/12/2017 08:46 AM       ASSESSMENT/ PLAN:     1. Hypertension: Fine today. - lisinopril-hydrochlorothiazide (PRINZIDE, ZESTORETIC) 20-25 mg per tablet; Take 2 Tab by mouth daily. - atenolol (TENORMIN) 25 mg tablet; Take 1 Tab by mouth daily. - verapamil SR (CALAN-SR) 240 mg CR tablet; Take 1 Tab by mouth daily.  - METABOLIC PANEL, COMPREHENSIVE  2. Hyperglycemia: Borderline diabetic. Diet and exercise. 3. Claudication / Peripheral Vascular Disease: Stable. - pentoxifylline CR (TRENTAL) 400 mg CR tablet; Take 1 Tab by mouth three (3) times daily (with meals). 4. Hypercholesterolemia: OK for now. - For now, continue lipitor 40. Watch diet and exercise.  - LIPID PANEL  - METABOLIC PANEL, COMPREHENSIVE  5. Thrombocytopenia:  Stable.  We discussed meds, and we might stop the trental for a bit and see if this makes any difference (hasn't tried this yet). Has been seen before by Dr. Leigh Ann Conway. Return to him if gets any lower. - CBC WITH AUTOMATED DIFF  6. Prostate cancer: Follow with urology, Dr. Evelina Jackson. \"My PSA is going up again. \"  He is s/p prostatectomy. 7. Constipation: Doing fine now. 8. Rhinitis: Recommended antihistamine. 9. Erectile Dysfunction: Doing fine for now. Gets mild HA with cialis--can continue this. Viagra didn't work as well. 10. Smoking: Congratulated on quitting (has a cigarette \"once in a while,\" but has mostly quit). Follow-up Disposition:  Return in about 4 months (around 12/16/2017) for HTN. Declines flu shot.

## 2017-08-16 NOTE — MR AVS SNAPSHOT
Visit Information Date & Time Provider Department Dept. Phone Encounter #  
 8/16/2017 11:15 AM Rito Campbell, [address] [facility] [id number] Follow-up Instructions Return in about 4 months (around 12/16/2017) for HTN. Follow-up and Disposition History Upcoming Health Maintenance Date Due DTaP/Tdap/Td series (1 - Tdap) 12/19/1965 Pneumococcal 65+ High/Highest Risk (2 of 2 - PCV13) 3/8/2014 INFLUENZA AGE 9 TO ADULT 8/1/2017 MEDICARE YEARLY EXAM 12/9/2017 GLAUCOMA SCREENING Q2Y 7/12/2019 COLONOSCOPY 3/28/2022 Allergies as of 8/16/2017  Review Complete On: 8/16/2017 By: Rito Campbell MD  
  
 Severity Noted Reaction Type Reactions Zoloft [Sertraline]  10/20/2009    Other (comments) Insomnia Current Immunizations  Reviewed on 10/1/2012 Name Date Pneumococcal Polysaccharide (PPSV-23) 3/8/2013 Not reviewed this visit You Were Diagnosed With   
  
 Codes Comments Essential hypertension    -  Primary ICD-10-CM: I10 
ICD-9-CM: 401.9 Hypercholesterolemia     ICD-10-CM: E78.00 ICD-9-CM: 272.0 Impaired fasting glucose     ICD-10-CM: R73.01 
ICD-9-CM: 790.21 Thrombocytopenia ([address]     ICD-10-CM: D69.6 ICD-9-CM: 287.5 Vitals BP Pulse Temp Resp Height(growth percentile) Weight(growth percentile) 129/67 (BP 1 Location: Left arm, BP Patient Position: Sitting) 97 98.4 °F (36.9 °C) (Oral) 16 5' 9\" (1.753 m) 200 lb 3.2 oz (90.8 kg) SpO2 BMI Smoking Status 99% 29.56 kg/m2 Passive Smoke Exposure - Never Smoker Vitals History BMI and BSA Data Body Mass Index Body Surface Area  
 29.56 kg/m 2 2.1 m 2 Preferred Pharmacy Pharmacy Name Phone [facility], [address] [place] [address] [phone] Your Updated Medication List  
  
   
This list is accurate as of: 8/16/17 11:50 AM.  Always use your most recent med list.  
  
  
  
  
 aspirin 81 mg tablet Take  by mouth. atorvastatin 40 mg tablet Commonly known as:  LIPITOR  
TAKE 1 TABLET EVERY NIGHT HYDROcodone-acetaminophen 5-325 mg per tablet Commonly known as:  Hurman Grist Take 1 Tab by mouth every six (6) hours as needed for Pain. lisinopril-hydroCHLOROthiazide 20-25 mg per tablet Commonly known as:  PRINZIDE, ZESTORETIC  
TAKE 1 TABLET TWICE DAILY METAMUCIL (SUGAR) PO Take  by mouth. MULTIVITAMIN PO Take  by mouth. Omeprazole delayed release 20 mg tablet Commonly known as:  PRILOSEC D/R Take 1 Tab by mouth daily. oxyCODONE-acetaminophen 5-325 mg per tablet Commonly known as:  PERCOCET Take 1 Tab by mouth every four (4) hours as needed for Pain. Max Daily Amount: 6 Tabs. pentoxifylline  mg CR tablet Commonly known as:  TRENTAL TAKE 1 TABLET THREE TIMES DAILY WITH MEALS  
  
 senna 8.6 mg tablet Commonly known as:  Senna Take 1 Tab by mouth daily. verapamil  mg CR tablet Commonly known as:  CALAN-SR  
TAKE 1 TABLET EVERY DAY Follow-up Instructions Return in about 4 months (around 12/16/2017) for HTN. To-Do List   
 11/17/2017 Lab:  CBC WITH AUTOMATED DIFF   
  
 11/17/2017 Lab:  HEMOGLOBIN A1C WITH EAG   
  
 11/17/2017 Lab:  LIPID PANEL   
  
 11/17/2017 Lab:  METABOLIC PANEL, COMPREHENSIVE [facility]! Dear Sue Heredia: Thank you for requesting a [employer] account. Our records indicate that you already have an active [employer] account. You can access your account anytime at [email or web address] Did you know that you can access your hospital and ER discharge instructions at any time in [employer]? You can also review all of your test results from your hospital stay or ER visit. Additional Information If you have questions, please visit the Frequently Asked Questions section of the [employer] website at [email or web address] Remember, [employer] is NOT to be used for urgent needs. For medical emergencies, dial 911. Now available from your iPhone and Android! Please provide this summary of care documentation to your next provider. Your primary care clinician is listed as [place]. If you have any questions after today's visit, please call [phone].

## 2017-08-16 NOTE — PROGRESS NOTES
1. Have you been to the ER, urgent care clinic since your last visit? Hospitalized since your last visit?no    2. Have you seen or consulted any other health care providers outside of the [address] [place] [address] since your last visit? Include any pap smears or colon screening.  no

## 2017-10-02 DIAGNOSIS — E78.00 HYPERCHOLESTEROLEMIA: ICD-10-CM

## 2017-10-02 DIAGNOSIS — I73.9 CLAUDICATION (HCC): ICD-10-CM

## 2017-10-02 DIAGNOSIS — I10 ESSENTIAL HYPERTENSION: ICD-10-CM

## 2017-10-03 RX ORDER — VERAPAMIL HYDROCHLORIDE 240 MG/1
TABLET, FILM COATED, EXTENDED RELEASE ORAL
Qty: 90 TAB | Refills: 3 | Status: SHIPPED | OUTPATIENT
Start: 2017-10-03

## 2017-10-03 RX ORDER — LISINOPRIL AND HYDROCHLOROTHIAZIDE 20; 25 MG/1; MG/1
TABLET ORAL
Qty: 180 TAB | Refills: 3 | Status: SHIPPED | OUTPATIENT
Start: 2017-10-03

## 2017-10-03 RX ORDER — PENTOXIFYLLINE 400 MG/1
TABLET, EXTENDED RELEASE ORAL
Qty: 270 TAB | Refills: 2 | Status: ON HOLD | OUTPATIENT
Start: 2017-10-03 | End: 2022-01-01

## 2017-10-03 RX ORDER — ATORVASTATIN CALCIUM 40 MG/1
TABLET, FILM COATED ORAL
Qty: 90 TAB | Refills: 3 | Status: SHIPPED | OUTPATIENT
Start: 2017-10-03

## 2018-01-23 ENCOUNTER — TELEPHONE (OUTPATIENT)
Dept: INTERNAL MEDICINE CLINIC | Age: 73
End: 2018-01-23

## 2018-01-23 NOTE — TELEPHONE ENCOUNTER
Patients wife called and is asking for his medical records to be sent to [facility].  Patients wife states that she has called here 3 times requesting that we fax them and that the office has requested them several times. Per patients chart, I see where the other office has faxed us x1 which has been sent to [facility] for records because they are requesting the last 3 years of his medical records from us. I personally faxed last office note, labs, and snapshot with patients HM listed. [email or web address] received request on 1/15.

## 2018-07-31 ENCOUNTER — HOSPITAL ENCOUNTER (OUTPATIENT)
Dept: ULTRASOUND IMAGING | Age: 73
Discharge: HOME | End: 2018-07-31
Attending: INTERNAL MEDICINE
Payer: MEDICARE

## 2018-07-31 DIAGNOSIS — D69.6 THROMBOCYTOPENIA, UNSPECIFIED (HCC): ICD-10-CM

## 2018-07-31 PROCEDURE — 76705 ECHO EXAM OF ABDOMEN: CPT

## 2022-01-01 ENCOUNTER — TELEPHONE (OUTPATIENT)
Dept: ONCOLOGY | Age: 77
End: 2022-01-01

## 2022-01-01 ENCOUNTER — APPOINTMENT (OUTPATIENT)
Dept: INFUSION THERAPY | Age: 77
End: 2022-01-01

## 2022-01-01 ENCOUNTER — HOSPITAL ENCOUNTER (OUTPATIENT)
Dept: INFUSION THERAPY | Age: 77
Discharge: HOME | End: 2022-08-02
Payer: MEDICARE

## 2022-01-01 ENCOUNTER — HOSPITAL ENCOUNTER (OUTPATIENT)
Dept: INFUSION THERAPY | Age: 77
Discharge: HOME | End: 2022-06-07
Payer: MEDICARE

## 2022-01-01 ENCOUNTER — TELEPHONE (OUTPATIENT)
Dept: PALLATIVE CARE | Age: 77
End: 2022-01-01

## 2022-01-01 ENCOUNTER — OFFICE VISIT (OUTPATIENT)
Dept: ONCOLOGY | Age: 77
End: 2022-01-01
Payer: MEDICARE

## 2022-01-01 ENCOUNTER — HOSPITAL ENCOUNTER (EMERGENCY)
Dept: CT IMAGING | Age: 77
Discharge: HOME | End: 2022-08-07
Attending: EMERGENCY MEDICINE
Payer: MEDICARE

## 2022-01-01 ENCOUNTER — HOSPITAL ENCOUNTER (OUTPATIENT)
Age: 77
Setting detail: OUTPATIENT SURGERY
Discharge: HOME | End: 2022-05-06
Attending: INTERNAL MEDICINE | Admitting: INTERNAL MEDICINE
Payer: MEDICARE

## 2022-01-01 ENCOUNTER — ANESTHESIA EVENT (OUTPATIENT)
Dept: ENDOSCOPY | Age: 77
End: 2022-01-01
Payer: MEDICARE

## 2022-01-01 ENCOUNTER — APPOINTMENT (OUTPATIENT)
Dept: GENERAL RADIOLOGY | Age: 77
DRG: 436 | End: 2022-01-01
Attending: SURGERY
Payer: MEDICARE

## 2022-01-01 ENCOUNTER — HOSPITAL ENCOUNTER (OUTPATIENT)
Dept: INFUSION THERAPY | Age: 77
Discharge: HOME | End: 2022-08-18
Payer: MEDICARE

## 2022-01-01 ENCOUNTER — HOSPITAL ENCOUNTER (OUTPATIENT)
Dept: CT IMAGING | Age: 77
Discharge: HOME | End: 2022-07-28
Attending: NURSE PRACTITIONER
Payer: MEDICARE

## 2022-01-01 ENCOUNTER — HOSPITAL ENCOUNTER (OUTPATIENT)
Dept: INFUSION THERAPY | Age: 77
Discharge: HOME | End: 2022-06-21
Payer: MEDICARE

## 2022-01-01 ENCOUNTER — HOSPITAL ENCOUNTER (OUTPATIENT)
Dept: INFUSION THERAPY | Age: 77
Discharge: HOME | End: 2022-07-05
Payer: MEDICARE

## 2022-01-01 ENCOUNTER — PATIENT MESSAGE (OUTPATIENT)
Dept: ONCOLOGY | Age: 77
End: 2022-01-01

## 2022-01-01 ENCOUNTER — APPOINTMENT (OUTPATIENT)
Dept: CT IMAGING | Age: 77
DRG: 436 | End: 2022-01-01
Attending: EMERGENCY MEDICINE
Payer: MEDICARE

## 2022-01-01 ENCOUNTER — HOSPITAL ENCOUNTER (OUTPATIENT)
Dept: INFUSION THERAPY | Age: 77
End: 2022-01-01
Payer: MEDICARE

## 2022-01-01 ENCOUNTER — DOCUMENTATION ONLY (OUTPATIENT)
Dept: ONCOLOGY | Age: 77
End: 2022-01-01

## 2022-01-01 ENCOUNTER — APPOINTMENT (OUTPATIENT)
Dept: GENERAL RADIOLOGY | Age: 77
DRG: 436 | End: 2022-01-01
Attending: NURSE PRACTITIONER
Payer: MEDICARE

## 2022-01-01 ENCOUNTER — APPOINTMENT (OUTPATIENT)
Dept: GENERAL RADIOLOGY | Age: 77
DRG: 436 | End: 2022-01-01
Attending: INTERNAL MEDICINE
Payer: MEDICARE

## 2022-01-01 ENCOUNTER — ANESTHESIA (OUTPATIENT)
Dept: ENDOSCOPY | Age: 77
End: 2022-01-01
Payer: MEDICARE

## 2022-01-01 ENCOUNTER — HOSPITAL ENCOUNTER (INPATIENT)
Age: 77
LOS: 6 days | DRG: 436 | End: 2022-09-12
Attending: EMERGENCY MEDICINE | Admitting: STUDENT IN AN ORGANIZED HEALTH CARE EDUCATION/TRAINING PROGRAM
Payer: MEDICARE

## 2022-01-01 ENCOUNTER — TRANSCRIBE ORDER (OUTPATIENT)
Dept: SCHEDULING | Age: 77
End: 2022-01-01

## 2022-01-01 ENCOUNTER — HOSPITAL ENCOUNTER (OUTPATIENT)
Dept: INFUSION THERAPY | Age: 77
Discharge: HOME | End: 2022-08-30
Payer: MEDICARE

## 2022-01-01 ENCOUNTER — HOSPITAL ENCOUNTER (OUTPATIENT)
Dept: CT IMAGING | Age: 77
Discharge: HOME | End: 2022-08-11
Attending: REGISTERED NURSE
Payer: MEDICARE

## 2022-01-01 ENCOUNTER — HOSPITAL ENCOUNTER (OUTPATIENT)
Dept: INFUSION THERAPY | Age: 77
Discharge: HOME | End: 2022-07-19
Payer: MEDICARE

## 2022-01-01 ENCOUNTER — HOSPITAL ENCOUNTER (OUTPATIENT)
Dept: INTERVENTIONAL RADIOLOGY/VASCULAR | Age: 77
Discharge: HOME | End: 2022-06-03
Attending: STUDENT IN AN ORGANIZED HEALTH CARE EDUCATION/TRAINING PROGRAM | Admitting: STUDENT IN AN ORGANIZED HEALTH CARE EDUCATION/TRAINING PROGRAM
Payer: MEDICARE

## 2022-01-01 ENCOUNTER — HOSPITAL ENCOUNTER (OUTPATIENT)
Dept: INFUSION THERAPY | Age: 77
Discharge: HOME | End: 2022-08-16
Payer: MEDICARE

## 2022-01-01 ENCOUNTER — HOSPITAL ENCOUNTER (OUTPATIENT)
Dept: MRI IMAGING | Age: 77
Discharge: HOME | End: 2022-04-28
Attending: INTERNAL MEDICINE
Payer: MEDICARE

## 2022-01-01 ENCOUNTER — HOSPITAL ENCOUNTER (OUTPATIENT)
Dept: PET IMAGING | Age: 77
Discharge: HOME | End: 2022-05-19
Attending: INTERNAL MEDICINE
Payer: MEDICARE

## 2022-01-01 ENCOUNTER — HOSPITAL ENCOUNTER (OUTPATIENT)
Dept: INFUSION THERAPY | Age: 77
Discharge: HOME | End: 2022-09-01
Payer: MEDICARE

## 2022-01-01 ENCOUNTER — HOSPITAL ENCOUNTER (EMERGENCY)
Age: 77
Discharge: HOME | End: 2022-08-07
Attending: EMERGENCY MEDICINE
Payer: MEDICARE

## 2022-01-01 ENCOUNTER — APPOINTMENT (OUTPATIENT)
Dept: CT IMAGING | Age: 77
DRG: 436 | End: 2022-01-01
Attending: PHYSICIAN ASSISTANT
Payer: MEDICARE

## 2022-01-01 ENCOUNTER — APPOINTMENT (OUTPATIENT)
Dept: CT IMAGING | Age: 77
End: 2022-01-01
Attending: NURSE PRACTITIONER
Payer: MEDICARE

## 2022-01-01 ENCOUNTER — HOSPITAL ENCOUNTER (OUTPATIENT)
Dept: INFUSION THERAPY | Age: 77
Discharge: HOME | End: 2022-06-16
Payer: MEDICARE

## 2022-01-01 ENCOUNTER — APPOINTMENT (OUTPATIENT)
Dept: ULTRASOUND IMAGING | Age: 77
End: 2022-01-01
Attending: INTERNAL MEDICINE
Payer: MEDICARE

## 2022-01-01 ENCOUNTER — APPOINTMENT (OUTPATIENT)
Dept: GENERAL RADIOLOGY | Age: 77
DRG: 436 | End: 2022-01-01
Attending: PHYSICIAN ASSISTANT
Payer: MEDICARE

## 2022-01-01 VITALS
TEMPERATURE: 97.8 F | DIASTOLIC BLOOD PRESSURE: 75 MMHG | SYSTOLIC BLOOD PRESSURE: 117 MMHG | HEART RATE: 87 BPM | OXYGEN SATURATION: 98 %

## 2022-01-01 VITALS
TEMPERATURE: 97.3 F | OXYGEN SATURATION: 97 % | BODY MASS INDEX: 23.48 KG/M2 | DIASTOLIC BLOOD PRESSURE: 86 MMHG | HEIGHT: 70 IN | SYSTOLIC BLOOD PRESSURE: 129 MMHG | HEART RATE: 102 BPM | WEIGHT: 164 LBS

## 2022-01-01 VITALS
SYSTOLIC BLOOD PRESSURE: 114 MMHG | WEIGHT: 164 LBS | RESPIRATION RATE: 18 BRPM | OXYGEN SATURATION: 98 % | HEART RATE: 103 BPM | DIASTOLIC BLOOD PRESSURE: 69 MMHG | TEMPERATURE: 98.2 F | BODY MASS INDEX: 23.53 KG/M2

## 2022-01-01 VITALS
RESPIRATION RATE: 18 BRPM | TEMPERATURE: 97.1 F | DIASTOLIC BLOOD PRESSURE: 53 MMHG | SYSTOLIC BLOOD PRESSURE: 95 MMHG | HEART RATE: 80 BPM

## 2022-01-01 VITALS
SYSTOLIC BLOOD PRESSURE: 118 MMHG | TEMPERATURE: 98.6 F | RESPIRATION RATE: 18 BRPM | BODY MASS INDEX: 21.1 KG/M2 | HEIGHT: 70 IN | HEART RATE: 89 BPM | DIASTOLIC BLOOD PRESSURE: 65 MMHG | WEIGHT: 147.4 LBS | OXYGEN SATURATION: 100 %

## 2022-01-01 VITALS
HEIGHT: 70 IN | TEMPERATURE: 98.7 F | DIASTOLIC BLOOD PRESSURE: 78 MMHG | OXYGEN SATURATION: 95 % | SYSTOLIC BLOOD PRESSURE: 125 MMHG | WEIGHT: 154 LBS | BODY MASS INDEX: 22.05 KG/M2 | HEART RATE: 90 BPM | RESPIRATION RATE: 18 BRPM

## 2022-01-01 VITALS
HEART RATE: 98 BPM | OXYGEN SATURATION: 98 % | TEMPERATURE: 98 F | DIASTOLIC BLOOD PRESSURE: 65 MMHG | RESPIRATION RATE: 16 BRPM | SYSTOLIC BLOOD PRESSURE: 104 MMHG

## 2022-01-01 VITALS
OXYGEN SATURATION: 95 % | RESPIRATION RATE: 18 BRPM | DIASTOLIC BLOOD PRESSURE: 68 MMHG | HEART RATE: 111 BPM | SYSTOLIC BLOOD PRESSURE: 118 MMHG | TEMPERATURE: 98.7 F | WEIGHT: 154 LBS | BODY MASS INDEX: 22.1 KG/M2

## 2022-01-01 VITALS
BODY MASS INDEX: 22.9 KG/M2 | HEART RATE: 105 BPM | RESPIRATION RATE: 17 BRPM | DIASTOLIC BLOOD PRESSURE: 76 MMHG | WEIGHT: 160 LBS | HEIGHT: 70 IN | SYSTOLIC BLOOD PRESSURE: 126 MMHG

## 2022-01-01 VITALS
SYSTOLIC BLOOD PRESSURE: 127 MMHG | DIASTOLIC BLOOD PRESSURE: 70 MMHG | WEIGHT: 152 LBS | BODY MASS INDEX: 21.76 KG/M2 | RESPIRATION RATE: 16 BRPM | TEMPERATURE: 98.5 F | HEART RATE: 79 BPM | HEIGHT: 70 IN

## 2022-01-01 VITALS
RESPIRATION RATE: 18 BRPM | DIASTOLIC BLOOD PRESSURE: 58 MMHG | TEMPERATURE: 97.8 F | WEIGHT: 145 LBS | BODY MASS INDEX: 20.81 KG/M2 | OXYGEN SATURATION: 99 % | SYSTOLIC BLOOD PRESSURE: 124 MMHG | HEART RATE: 76 BPM

## 2022-01-01 VITALS
HEART RATE: 93 BPM | DIASTOLIC BLOOD PRESSURE: 78 MMHG | OXYGEN SATURATION: 98 % | BODY MASS INDEX: 23.48 KG/M2 | HEIGHT: 70 IN | RESPIRATION RATE: 14 BRPM | WEIGHT: 164 LBS | SYSTOLIC BLOOD PRESSURE: 128 MMHG

## 2022-01-01 VITALS
TEMPERATURE: 98.6 F | HEART RATE: 100 BPM | DIASTOLIC BLOOD PRESSURE: 58 MMHG | RESPIRATION RATE: 18 BRPM | BODY MASS INDEX: 21.09 KG/M2 | WEIGHT: 147 LBS | SYSTOLIC BLOOD PRESSURE: 115 MMHG | OXYGEN SATURATION: 100 %

## 2022-01-01 VITALS
WEIGHT: 160 LBS | TEMPERATURE: 99.8 F | BODY MASS INDEX: 22.9 KG/M2 | SYSTOLIC BLOOD PRESSURE: 161 MMHG | RESPIRATION RATE: 21 BRPM | HEART RATE: 99 BPM | HEIGHT: 70 IN | DIASTOLIC BLOOD PRESSURE: 74 MMHG | OXYGEN SATURATION: 99 %

## 2022-01-01 VITALS
TEMPERATURE: 97.3 F | RESPIRATION RATE: 18 BRPM | SYSTOLIC BLOOD PRESSURE: 164 MMHG | DIASTOLIC BLOOD PRESSURE: 80 MMHG | HEART RATE: 88 BPM

## 2022-01-01 VITALS
RESPIRATION RATE: 17 BRPM | HEART RATE: 117 BPM | DIASTOLIC BLOOD PRESSURE: 74 MMHG | TEMPERATURE: 97.7 F | SYSTOLIC BLOOD PRESSURE: 119 MMHG

## 2022-01-01 VITALS — HEIGHT: 70 IN | WEIGHT: 151 LBS | RESPIRATION RATE: 18 BRPM | HEART RATE: 115 BPM | BODY MASS INDEX: 21.62 KG/M2

## 2022-01-01 VITALS
TEMPERATURE: 97.8 F | SYSTOLIC BLOOD PRESSURE: 128 MMHG | BODY MASS INDEX: 20.76 KG/M2 | HEART RATE: 93 BPM | WEIGHT: 145 LBS | RESPIRATION RATE: 18 BRPM | DIASTOLIC BLOOD PRESSURE: 67 MMHG | HEIGHT: 70 IN

## 2022-01-01 VITALS
DIASTOLIC BLOOD PRESSURE: 64 MMHG | TEMPERATURE: 97.8 F | RESPIRATION RATE: 16 BRPM | HEART RATE: 100 BPM | OXYGEN SATURATION: 99 % | SYSTOLIC BLOOD PRESSURE: 120 MMHG

## 2022-01-01 VITALS
RESPIRATION RATE: 18 BRPM | HEART RATE: 105 BPM | TEMPERATURE: 97.1 F | WEIGHT: 140 LBS | SYSTOLIC BLOOD PRESSURE: 99 MMHG | BODY MASS INDEX: 20.09 KG/M2 | DIASTOLIC BLOOD PRESSURE: 64 MMHG | OXYGEN SATURATION: 100 %

## 2022-01-01 VITALS
SYSTOLIC BLOOD PRESSURE: 136 MMHG | DIASTOLIC BLOOD PRESSURE: 54 MMHG | BODY MASS INDEX: 21.7 KG/M2 | WEIGHT: 151.6 LBS | RESPIRATION RATE: 18 BRPM | HEART RATE: 72 BPM | TEMPERATURE: 97.5 F | HEIGHT: 70 IN

## 2022-01-01 VITALS
WEIGHT: 140 LBS | HEART RATE: 66 BPM | RESPIRATION RATE: 28 BRPM | OXYGEN SATURATION: 100 % | DIASTOLIC BLOOD PRESSURE: 67 MMHG | BODY MASS INDEX: 20.04 KG/M2 | HEIGHT: 70 IN | SYSTOLIC BLOOD PRESSURE: 111 MMHG | TEMPERATURE: 97.5 F

## 2022-01-01 VITALS — WEIGHT: 159 LBS | BODY MASS INDEX: 22.76 KG/M2 | HEIGHT: 70 IN

## 2022-01-01 DIAGNOSIS — C25.9 MALIGNANT NEOPLASM OF PANCREAS, UNSPECIFIED LOCATION OF MALIGNANCY (HCC): Primary | ICD-10-CM

## 2022-01-01 DIAGNOSIS — D69.3 CHRONIC ITP (IDIOPATHIC THROMBOCYTOPENIA) (HCC): ICD-10-CM

## 2022-01-01 DIAGNOSIS — R10.13 EPIGASTRIC PAIN: ICD-10-CM

## 2022-01-01 DIAGNOSIS — K57.32 DIVERTICULITIS OF LARGE INTESTINE WITHOUT PERFORATION OR ABSCESS WITHOUT BLEEDING: Primary | ICD-10-CM

## 2022-01-01 DIAGNOSIS — E87.1 HYPONATREMIA: ICD-10-CM

## 2022-01-01 DIAGNOSIS — C25.9 MALIGNANT NEOPLASM OF PANCREAS, UNSPECIFIED LOCATION OF MALIGNANCY (HCC): ICD-10-CM

## 2022-01-01 DIAGNOSIS — Z71.89 GOALS OF CARE, COUNSELING/DISCUSSION: ICD-10-CM
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
## 2022-01-01 DIAGNOSIS — R00.0 TACHYCARDIA: Primary | ICD-10-CM

## 2022-01-01 DIAGNOSIS — N17.9 AKI (ACUTE KIDNEY INJURY) (HCC): ICD-10-CM

## 2022-01-01 DIAGNOSIS — Z95.828 PORT-A-CATH IN PLACE: ICD-10-CM

## 2022-01-01 DIAGNOSIS — C77.4 SECONDARY AND UNSPECIFIED MALIGNANT NEOPLASM OF INGUINAL AND LOWER LIMB LYMPH NODES (HCC): ICD-10-CM

## 2022-01-01 DIAGNOSIS — C61 PROSTATE CANCER (HCC): ICD-10-CM

## 2022-01-01 DIAGNOSIS — C72.1: ICD-10-CM

## 2022-01-01 DIAGNOSIS — G89.3 CANCER ASSOCIATED PAIN: ICD-10-CM

## 2022-01-01 DIAGNOSIS — R53.83 CHEMOTHERAPY-INDUCED FATIGUE: ICD-10-CM

## 2022-01-01 DIAGNOSIS — T45.1X5A CHEMOTHERAPY-INDUCED FATIGUE: ICD-10-CM

## 2022-01-01 DIAGNOSIS — Z51.11 ENCOUNTER FOR ANTINEOPLASTIC CHEMOTHERAPY: ICD-10-CM

## 2022-01-01 DIAGNOSIS — Z51.5 PALLIATIVE CARE ENCOUNTER: ICD-10-CM

## 2022-01-01 DIAGNOSIS — K57.32 DIVERTICULITIS OF LARGE INTESTINE WITHOUT PERFORATION OR ABSCESS WITHOUT BLEEDING: ICD-10-CM

## 2022-01-01 DIAGNOSIS — C25.0 MALIGNANT NEOPLASM OF HEAD OF PANCREAS (HCC): ICD-10-CM

## 2022-01-01 DIAGNOSIS — D49.0 PANCREAS NEOPLASM: ICD-10-CM

## 2022-01-01 DIAGNOSIS — K56.609 SBO (SMALL BOWEL OBSTRUCTION) (HCC): Primary | ICD-10-CM

## 2022-01-01 DIAGNOSIS — R11.0 NAUSEA: ICD-10-CM

## 2022-01-01 LAB
ALBUMIN SERPL-MCNC: 2.1 G/DL (ref 3.5–5)
ALBUMIN SERPL-MCNC: 3.2 G/DL (ref 3.5–5)
ALBUMIN SERPL-MCNC: 3.3 G/DL (ref 3.5–5)
ALBUMIN SERPL-MCNC: 3.4 G/DL (ref 3.5–5)
ALBUMIN SERPL-MCNC: 3.5 G/DL (ref 3.5–5)
ALBUMIN SERPL-MCNC: 3.6 G/DL (ref 3.5–5)
ALBUMIN SERPL-MCNC: 3.6 G/DL (ref 3.5–5)
ALBUMIN SERPL-MCNC: 4.7 G/DL (ref 3.7–4.7)
ALBUMIN/GLOB SERPL: 0.8 {RATIO} (ref 1.1–2.2)
ALBUMIN/GLOB SERPL: 1 {RATIO} (ref 1.1–2.2)
ALBUMIN/GLOB SERPL: 1 {RATIO} (ref 1.1–2.2)
ALBUMIN/GLOB SERPL: 1.1 {RATIO} (ref 1.1–2.2)
ALBUMIN/GLOB SERPL: 1.1 {RATIO} (ref 1.1–2.2)
ALBUMIN/GLOB SERPL: 1.2 {RATIO} (ref 1.1–2.2)
ALBUMIN/GLOB SERPL: 1.3 {RATIO} (ref 1.1–2.2)
ALBUMIN/GLOB SERPL: 2 {RATIO} (ref 1.2–2.2)
ALP SERPL-CCNC: 49 U/L (ref 45–117)
ALP SERPL-CCNC: 60 U/L (ref 45–117)
ALP SERPL-CCNC: 60 U/L (ref 45–117)
ALP SERPL-CCNC: 62 IU/L (ref 44–121)
ALP SERPL-CCNC: 64 U/L (ref 45–117)
ALP SERPL-CCNC: 65 U/L (ref 45–117)
ALP SERPL-CCNC: 66 U/L (ref 45–117)
ALP SERPL-CCNC: 68 U/L (ref 45–117)
ALP SERPL-CCNC: 70 U/L (ref 45–117)
ALP SERPL-CCNC: 70 U/L (ref 45–117)
ALP SERPL-CCNC: 71 U/L (ref 45–117)
ALP SERPL-CCNC: 75 U/L (ref 45–117)
ALP SERPL-CCNC: 76 U/L (ref 45–117)
ALT SERPL-CCNC: 19 IU/L (ref 0–44)
ALT SERPL-CCNC: 20 U/L (ref 12–78)
ALT SERPL-CCNC: 21 U/L (ref 12–78)
ALT SERPL-CCNC: 21 U/L (ref 12–78)
ALT SERPL-CCNC: 23 U/L (ref 12–78)
ALT SERPL-CCNC: 24 U/L (ref 12–78)
ALT SERPL-CCNC: 25 U/L (ref 12–78)
ALT SERPL-CCNC: 27 U/L (ref 12–78)
ALT SERPL-CCNC: 32 U/L (ref 12–78)
ALT SERPL-CCNC: 37 U/L (ref 12–78)
ALT SERPL-CCNC: 38 U/L (ref 12–78)
ALT SERPL-CCNC: 39 U/L (ref 12–78)
ALT SERPL-CCNC: 45 U/L (ref 12–78)
ANION GAP SERPL CALC-SCNC: 10 MMOL/L (ref 5–15)
ANION GAP SERPL CALC-SCNC: 12 MMOL/L (ref 5–15)
ANION GAP SERPL CALC-SCNC: 18 MMOL/L (ref 5–15)
ANION GAP SERPL CALC-SCNC: 5 MMOL/L (ref 5–15)
ANION GAP SERPL CALC-SCNC: 6 MMOL/L (ref 5–15)
ANION GAP SERPL CALC-SCNC: 7 MMOL/L (ref 5–15)
ANION GAP SERPL CALC-SCNC: 7 MMOL/L (ref 5–15)
ANION GAP SERPL CALC-SCNC: 8 MMOL/L (ref 5–15)
ANION GAP SERPL CALC-SCNC: 8 MMOL/L (ref 5–15)
ANION GAP SERPL CALC-SCNC: 9 MMOL/L (ref 5–15)
ANION GAP SERPL CALC-SCNC: 9 MMOL/L (ref 5–15)
APPEARANCE UR: ABNORMAL
APPEARANCE UR: CLEAR
ARTERIAL PATENCY WRIST A: POSITIVE
AST SERPL-CCNC: 10 U/L (ref 15–37)
AST SERPL-CCNC: 11 IU/L (ref 0–40)
AST SERPL-CCNC: 12 U/L (ref 15–37)
AST SERPL-CCNC: 12 U/L (ref 15–37)
AST SERPL-CCNC: 15 U/L (ref 15–37)
AST SERPL-CCNC: 18 U/L (ref 15–37)
AST SERPL-CCNC: 21 U/L (ref 15–37)
AST SERPL-CCNC: 4 U/L (ref 15–37)
AST SERPL-CCNC: 7 U/L (ref 15–37)
AST SERPL-CCNC: 8 U/L (ref 15–37)
AST SERPL-CCNC: 8 U/L (ref 15–37)
ATRIAL RATE: 118 BPM
ATRIAL RATE: 125 BPM
BACTERIA SPEC CULT: ABNORMAL
BACTERIA SPEC CULT: NORMAL
BACTERIA URNS QL MICRO: NEGATIVE /HPF
BACTERIA URNS QL MICRO: NEGATIVE /HPF
BASE DEFICIT BLD-SCNC: 25.3 MMOL/L
BASOPHILS # BLD AUTO: 0.1 X10E3/UL (ref 0–0.2)
BASOPHILS # BLD: 0 K/UL (ref 0–0.1)
BASOPHILS # BLD: 0.1 K/UL (ref 0–0.1)
BASOPHILS NFR BLD AUTO: 2 %
BASOPHILS NFR BLD: 0 % (ref 0–1)
BASOPHILS NFR BLD: 1 % (ref 0–1)
BDY SITE: ABNORMAL
BILIRUB SERPL-MCNC: 0.3 MG/DL (ref 0.2–1)
BILIRUB SERPL-MCNC: 0.4 MG/DL (ref 0.2–1)
BILIRUB SERPL-MCNC: 0.5 MG/DL (ref 0.2–1)
BILIRUB SERPL-MCNC: 0.6 MG/DL (ref 0.2–1)
BILIRUB SERPL-MCNC: 0.7 MG/DL (ref 0–1.2)
BILIRUB SERPL-MCNC: 0.8 MG/DL (ref 0.2–1)
BILIRUB SERPL-MCNC: 1 MG/DL (ref 0.2–1)
BILIRUB SERPL-MCNC: 1.1 MG/DL (ref 0.2–1)
BILIRUB UR QL: NEGATIVE
BILIRUB UR QL: NEGATIVE
BUN SERPL-MCNC: 17 MG/DL (ref 6–20)
BUN SERPL-MCNC: 18 MG/DL (ref 6–20)
BUN SERPL-MCNC: 20 MG/DL (ref 6–20)
BUN SERPL-MCNC: 20 MG/DL (ref 6–20)
BUN SERPL-MCNC: 21 MG/DL (ref 6–20)
BUN SERPL-MCNC: 21 MG/DL (ref 6–20)
BUN SERPL-MCNC: 22 MG/DL (ref 6–20)
BUN SERPL-MCNC: 22 MG/DL (ref 8–27)
BUN SERPL-MCNC: 24 MG/DL (ref 6–20)
BUN SERPL-MCNC: 25 MG/DL (ref 6–20)
BUN SERPL-MCNC: 26 MG/DL (ref 6–20)
BUN SERPL-MCNC: 28 MG/DL (ref 6–20)
BUN SERPL-MCNC: 28 MG/DL (ref 6–20)
BUN SERPL-MCNC: 30 MG/DL (ref 6–20)
BUN SERPL-MCNC: 35 MG/DL (ref 6–20)
BUN SERPL-MCNC: 37 MG/DL (ref 6–20)
BUN/CREAT SERPL: 16 (ref 12–20)
BUN/CREAT SERPL: 16 (ref 12–20)
BUN/CREAT SERPL: 17 (ref 10–24)
BUN/CREAT SERPL: 17 (ref 12–20)
BUN/CREAT SERPL: 18 (ref 12–20)
BUN/CREAT SERPL: 19 (ref 12–20)
BUN/CREAT SERPL: 21 (ref 12–20)
BUN/CREAT SERPL: 21 (ref 12–20)
BUN/CREAT SERPL: 22 (ref 12–20)
BUN/CREAT SERPL: 22 (ref 12–20)
BUN/CREAT SERPL: 24 (ref 12–20)
BUN/CREAT SERPL: 24 (ref 12–20)
BUN/CREAT SERPL: 25 (ref 12–20)
BUN/CREAT SERPL: 27 (ref 12–20)
BUN/CREAT SERPL: 28 (ref 12–20)
BUN/CREAT SERPL: 29 (ref 12–20)
CALCIUM SERPL-MCNC: 10.3 MG/DL (ref 8.6–10.2)
CALCIUM SERPL-MCNC: 8 MG/DL (ref 8.5–10.1)
CALCIUM SERPL-MCNC: 8.2 MG/DL (ref 8.5–10.1)
CALCIUM SERPL-MCNC: 8.4 MG/DL (ref 8.5–10.1)
CALCIUM SERPL-MCNC: 8.4 MG/DL (ref 8.5–10.1)
CALCIUM SERPL-MCNC: 8.9 MG/DL (ref 8.5–10.1)
CALCIUM SERPL-MCNC: 9.1 MG/DL (ref 8.5–10.1)
CALCIUM SERPL-MCNC: 9.4 MG/DL (ref 8.5–10.1)
CALCIUM SERPL-MCNC: 9.5 MG/DL (ref 8.5–10.1)
CALCIUM SERPL-MCNC: 9.5 MG/DL (ref 8.5–10.1)
CALCIUM SERPL-MCNC: 9.6 MG/DL (ref 8.5–10.1)
CALCIUM SERPL-MCNC: 9.7 MG/DL (ref 8.5–10.1)
CALCIUM SERPL-MCNC: 9.9 MG/DL (ref 8.5–10.1)
CALCULATED P AXIS, ECG09: 66 DEGREES
CALCULATED P AXIS, ECG09: 70 DEGREES
CALCULATED R AXIS, ECG10: -10 DEGREES
CALCULATED R AXIS, ECG10: -21 DEGREES
CALCULATED T AXIS, ECG11: 54 DEGREES
CALCULATED T AXIS, ECG11: 84 DEGREES
CANCER AG19-9 SERPL-ACNC: 6 U/ML (ref 0–35)
CANCER AG19-9 SERPL-ACNC: <2 U/ML (ref 0–35)
CC UR VC: ABNORMAL
CHLORIDE SERPL-SCNC: 101 MMOL/L (ref 97–108)
CHLORIDE SERPL-SCNC: 102 MMOL/L (ref 97–108)
CHLORIDE SERPL-SCNC: 102 MMOL/L (ref 97–108)
CHLORIDE SERPL-SCNC: 103 MMOL/L (ref 97–108)
CHLORIDE SERPL-SCNC: 107 MMOL/L (ref 97–108)
CHLORIDE SERPL-SCNC: 109 MMOL/L (ref 97–108)
CHLORIDE SERPL-SCNC: 111 MMOL/L (ref 97–108)
CHLORIDE SERPL-SCNC: 95 MMOL/L (ref 96–106)
CHLORIDE SERPL-SCNC: 95 MMOL/L (ref 97–108)
CHLORIDE SERPL-SCNC: 96 MMOL/L (ref 97–108)
CHLORIDE SERPL-SCNC: 96 MMOL/L (ref 97–108)
CHLORIDE SERPL-SCNC: 97 MMOL/L (ref 97–108)
CHLORIDE SERPL-SCNC: 98 MMOL/L (ref 97–108)
CHLORIDE SERPL-SCNC: 99 MMOL/L (ref 97–108)
CHLORIDE SERPL-SCNC: 99 MMOL/L (ref 97–108)
CO2 SERPL-SCNC: 11 MMOL/L (ref 21–32)
CO2 SERPL-SCNC: 21 MMOL/L (ref 20–29)
CO2 SERPL-SCNC: 22 MMOL/L (ref 21–32)
CO2 SERPL-SCNC: 23 MMOL/L (ref 21–32)
CO2 SERPL-SCNC: 24 MMOL/L (ref 21–32)
CO2 SERPL-SCNC: 24 MMOL/L (ref 21–32)
CO2 SERPL-SCNC: 25 MMOL/L (ref 21–32)
CO2 SERPL-SCNC: 26 MMOL/L (ref 21–32)
CO2 SERPL-SCNC: 27 MMOL/L (ref 21–32)
CO2 SERPL-SCNC: 28 MMOL/L (ref 21–32)
CO2 SERPL-SCNC: 30 MMOL/L (ref 21–32)
COLOR UR: ABNORMAL
COLOR UR: NORMAL
COMMENT, HOLDF: NORMAL
CREAT SERPL-MCNC: 0.74 MG/DL (ref 0.7–1.3)
CREAT SERPL-MCNC: 0.79 MG/DL (ref 0.7–1.3)
CREAT SERPL-MCNC: 0.93 MG/DL (ref 0.7–1.3)
CREAT SERPL-MCNC: 1.02 MG/DL (ref 0.7–1.3)
CREAT SERPL-MCNC: 1.03 MG/DL (ref 0.7–1.3)
CREAT SERPL-MCNC: 1.1 MG/DL (ref 0.7–1.3)
CREAT SERPL-MCNC: 1.13 MG/DL (ref 0.7–1.3)
CREAT SERPL-MCNC: 1.14 MG/DL (ref 0.7–1.3)
CREAT SERPL-MCNC: 1.15 MG/DL (ref 0.7–1.3)
CREAT SERPL-MCNC: 1.18 MG/DL (ref 0.7–1.3)
CREAT SERPL-MCNC: 1.23 MG/DL (ref 0.7–1.3)
CREAT SERPL-MCNC: 1.25 MG/DL (ref 0.7–1.3)
CREAT SERPL-MCNC: 1.25 MG/DL (ref 0.7–1.3)
CREAT SERPL-MCNC: 1.28 MG/DL (ref 0.76–1.27)
CREAT SERPL-MCNC: 1.4 MG/DL (ref 0.7–1.3)
CREAT SERPL-MCNC: 1.48 MG/DL (ref 0.7–1.3)
CREAT SERPL-MCNC: 1.54 MG/DL (ref 0.7–1.3)
CREAT SERPL-MCNC: 1.68 MG/DL (ref 0.7–1.3)
D DIMER PPP FEU-MCNC: 2.51 MG/L FEU (ref 0–0.65)
DIAGNOSIS, 93000: NORMAL
DIAGNOSIS, 93000: NORMAL
DIFFERENTIAL METHOD BLD: ABNORMAL
EGFR: 58 ML/MIN/1.73
EOSINOPHIL # BLD AUTO: 0.2 X10E3/UL (ref 0–0.4)
EOSINOPHIL # BLD: 0 K/UL (ref 0–0.4)
EOSINOPHIL # BLD: 0.1 K/UL (ref 0–0.4)
EOSINOPHIL # BLD: 0.2 K/UL (ref 0–0.4)
EOSINOPHIL NFR BLD AUTO: 3 %
EOSINOPHIL NFR BLD: 0 % (ref 0–7)
EOSINOPHIL NFR BLD: 1 % (ref 0–7)
EOSINOPHIL NFR BLD: 2 % (ref 0–7)
EOSINOPHIL NFR BLD: 3 % (ref 0–7)
EOSINOPHIL NFR BLD: 3 % (ref 0–7)
EPITH CASTS URNS QL MICRO: ABNORMAL /LPF
EPITH CASTS URNS QL MICRO: NORMAL /LPF
ERYTHROCYTE [DISTWIDTH] IN BLOOD BY AUTOMATED COUNT: 13 % (ref 11.6–15.4)
ERYTHROCYTE [DISTWIDTH] IN BLOOD BY AUTOMATED COUNT: 13.7 % (ref 11.5–14.5)
ERYTHROCYTE [DISTWIDTH] IN BLOOD BY AUTOMATED COUNT: 13.8 % (ref 11.5–14.5)
ERYTHROCYTE [DISTWIDTH] IN BLOOD BY AUTOMATED COUNT: 14.3 % (ref 11.5–14.5)
ERYTHROCYTE [DISTWIDTH] IN BLOOD BY AUTOMATED COUNT: 14.7 % (ref 11.5–14.5)
ERYTHROCYTE [DISTWIDTH] IN BLOOD BY AUTOMATED COUNT: 16 % (ref 11.5–14.5)
ERYTHROCYTE [DISTWIDTH] IN BLOOD BY AUTOMATED COUNT: 16.1 % (ref 11.5–14.5)
ERYTHROCYTE [DISTWIDTH] IN BLOOD BY AUTOMATED COUNT: 16.4 % (ref 11.5–14.5)
ERYTHROCYTE [DISTWIDTH] IN BLOOD BY AUTOMATED COUNT: 16.4 % (ref 11.5–14.5)
ERYTHROCYTE [DISTWIDTH] IN BLOOD BY AUTOMATED COUNT: 16.5 % (ref 11.5–14.5)
ERYTHROCYTE [DISTWIDTH] IN BLOOD BY AUTOMATED COUNT: 16.7 % (ref 11.5–14.5)
ERYTHROCYTE [DISTWIDTH] IN BLOOD BY AUTOMATED COUNT: 16.9 % (ref 11.5–14.5)
ERYTHROCYTE [DISTWIDTH] IN BLOOD BY AUTOMATED COUNT: 16.9 % (ref 11.5–14.5)
ERYTHROCYTE [DISTWIDTH] IN BLOOD BY AUTOMATED COUNT: 17.1 % (ref 11.5–14.5)
ERYTHROCYTE [DISTWIDTH] IN BLOOD BY AUTOMATED COUNT: 17.7 % (ref 11.5–14.5)
GAS FLOW.O2 O2 DELIVERY SYS: ABNORMAL L/MIN
GAS FLOW.O2 SETTING OXYMISER: 22 BPM
GLOBULIN SER CALC-MCNC: 2.3 G/DL (ref 1.5–4.5)
GLOBULIN SER CALC-MCNC: 2.5 G/DL (ref 2–4)
GLOBULIN SER CALC-MCNC: 2.6 G/DL (ref 2–4)
GLOBULIN SER CALC-MCNC: 2.7 G/DL (ref 2–4)
GLOBULIN SER CALC-MCNC: 2.9 G/DL (ref 2–4)
GLOBULIN SER CALC-MCNC: 3 G/DL (ref 2–4)
GLOBULIN SER CALC-MCNC: 3.1 G/DL (ref 2–4)
GLOBULIN SER CALC-MCNC: 3.2 G/DL (ref 2–4)
GLOBULIN SER CALC-MCNC: 3.3 G/DL (ref 2–4)
GLOBULIN SER CALC-MCNC: 3.3 G/DL (ref 2–4)
GLOBULIN SER CALC-MCNC: 3.5 G/DL (ref 2–4)
GLUCOSE BLD STRIP.AUTO-MCNC: 141 MG/DL (ref 65–117)
GLUCOSE BLD STRIP.AUTO-MCNC: 93 MG/DL (ref 65–117)
GLUCOSE SERPL-MCNC: 104 MG/DL (ref 65–100)
GLUCOSE SERPL-MCNC: 110 MG/DL (ref 65–100)
GLUCOSE SERPL-MCNC: 132 MG/DL (ref 65–100)
GLUCOSE SERPL-MCNC: 142 MG/DL (ref 65–100)
GLUCOSE SERPL-MCNC: 143 MG/DL (ref 65–99)
GLUCOSE SERPL-MCNC: 145 MG/DL (ref 65–100)
GLUCOSE SERPL-MCNC: 154 MG/DL (ref 65–100)
GLUCOSE SERPL-MCNC: 156 MG/DL (ref 65–100)
GLUCOSE SERPL-MCNC: 157 MG/DL (ref 65–100)
GLUCOSE SERPL-MCNC: 157 MG/DL (ref 65–100)
GLUCOSE SERPL-MCNC: 160 MG/DL (ref 65–100)
GLUCOSE SERPL-MCNC: 164 MG/DL (ref 65–100)
GLUCOSE SERPL-MCNC: 180 MG/DL (ref 65–100)
GLUCOSE SERPL-MCNC: 180 MG/DL (ref 65–100)
GLUCOSE SERPL-MCNC: 202 MG/DL (ref 65–100)
GLUCOSE SERPL-MCNC: 402 MG/DL (ref 65–100)
GLUCOSE SERPL-MCNC: 84 MG/DL (ref 65–100)
GLUCOSE SERPL-MCNC: 93 MG/DL (ref 65–100)
GLUCOSE UR STRIP.AUTO-MCNC: NEGATIVE MG/DL
GLUCOSE UR STRIP.AUTO-MCNC: NEGATIVE MG/DL
HBV CORE AB SERPL QL IA: NEGATIVE
HBV CORE AB SERPL QL IA: NEGATIVE
HBV CORE IGM SERPL QL IA: NEGATIVE
HBV SURFACE AB SER QL: NONREACTIVE
HBV SURFACE AB SER-ACNC: 3.34 MIU/ML
HBV SURFACE AG SER QL: <0.1 INDEX
HBV SURFACE AG SER QL: NEGATIVE
HCO3 BLD-SCNC: 7.7 MMOL/L (ref 22–26)
HCT VFR BLD AUTO: 24.4 % (ref 36.6–50.3)
HCT VFR BLD AUTO: 25.8 % (ref 36.6–50.3)
HCT VFR BLD AUTO: 26.1 % (ref 36.6–50.3)
HCT VFR BLD AUTO: 30.3 % (ref 36.6–50.3)
HCT VFR BLD AUTO: 31.6 % (ref 36.6–50.3)
HCT VFR BLD AUTO: 31.7 % (ref 36.6–50.3)
HCT VFR BLD AUTO: 32.5 % (ref 36.6–50.3)
HCT VFR BLD AUTO: 34.3 % (ref 36.6–50.3)
HCT VFR BLD AUTO: 34.4 % (ref 36.6–50.3)
HCT VFR BLD AUTO: 34.6 % (ref 36.6–50.3)
HCT VFR BLD AUTO: 36.1 % (ref 36.6–50.3)
HCT VFR BLD AUTO: 36.4 % (ref 36.6–50.3)
HCT VFR BLD AUTO: 37.2 % (ref 36.6–50.3)
HCT VFR BLD AUTO: 37.5 % (ref 36.6–50.3)
HCT VFR BLD AUTO: 38 % (ref 36.6–50.3)
HCT VFR BLD AUTO: 39.3 % (ref 36.6–50.3)
HCT VFR BLD AUTO: 40.4 % (ref 37.5–51)
HGB BLD-MCNC: 10.5 G/DL (ref 12.1–17)
HGB BLD-MCNC: 10.9 G/DL (ref 12.1–17)
HGB BLD-MCNC: 11 G/DL (ref 12.1–17)
HGB BLD-MCNC: 11.1 G/DL (ref 12.1–17)
HGB BLD-MCNC: 11.4 G/DL (ref 12.1–17)
HGB BLD-MCNC: 11.6 G/DL (ref 12.1–17)
HGB BLD-MCNC: 11.7 G/DL (ref 12.1–17)
HGB BLD-MCNC: 11.8 G/DL (ref 12.1–17)
HGB BLD-MCNC: 12.6 G/DL (ref 12.1–17)
HGB BLD-MCNC: 12.9 G/DL (ref 12.1–17)
HGB BLD-MCNC: 13 G/DL (ref 12.1–17)
HGB BLD-MCNC: 13.3 G/DL (ref 12.1–17)
HGB BLD-MCNC: 13.5 G/DL (ref 12.1–17)
HGB BLD-MCNC: 14 G/DL (ref 13–17.7)
HGB BLD-MCNC: 8.3 G/DL (ref 12.1–17)
HGB BLD-MCNC: 8.6 G/DL (ref 12.1–17)
HGB BLD-MCNC: 9.1 G/DL (ref 12.1–17)
HGB UR QL STRIP: ABNORMAL
HGB UR QL STRIP: NEGATIVE
HYALINE CASTS URNS QL MICRO: ABNORMAL /LPF (ref 0–5)
IMM GRANULOCYTES # BLD AUTO: 0 K/UL
IMM GRANULOCYTES # BLD AUTO: 0 K/UL (ref 0–0.04)
IMM GRANULOCYTES # BLD AUTO: 0 X10E3/UL (ref 0–0.1)
IMM GRANULOCYTES # BLD AUTO: 0.1 K/UL (ref 0–0.04)
IMM GRANULOCYTES # BLD AUTO: 0.2 K/UL (ref 0–0.04)
IMM GRANULOCYTES NFR BLD AUTO: 0 %
IMM GRANULOCYTES NFR BLD AUTO: 0 %
IMM GRANULOCYTES NFR BLD AUTO: 0 % (ref 0–0.5)
IMM GRANULOCYTES NFR BLD AUTO: 1 % (ref 0–0.5)
KETONES UR QL STRIP.AUTO: ABNORMAL MG/DL
KETONES UR QL STRIP.AUTO: NEGATIVE MG/DL
LACTATE BLD-SCNC: 2 MMOL/L (ref 0.4–2)
LACTATE SERPL-SCNC: 0.8 MMOL/L (ref 0.4–2)
LACTATE SERPL-SCNC: 10.5 MMOL/L (ref 0.4–2)
LEUKOCYTE ESTERASE UR QL STRIP.AUTO: ABNORMAL
LEUKOCYTE ESTERASE UR QL STRIP.AUTO: NEGATIVE
LIPASE SERPL-CCNC: 98 U/L (ref 73–393)
LYMPHOCYTES # BLD AUTO: 2.1 X10E3/UL (ref 0.7–3.1)
LYMPHOCYTES # BLD: 0.7 K/UL (ref 0.8–3.5)
LYMPHOCYTES # BLD: 0.7 K/UL (ref 0.8–3.5)
LYMPHOCYTES # BLD: 0.9 K/UL (ref 0.8–3.5)
LYMPHOCYTES # BLD: 1 K/UL (ref 0.8–3.5)
LYMPHOCYTES # BLD: 1 K/UL (ref 0.8–3.5)
LYMPHOCYTES # BLD: 1.3 K/UL (ref 0.8–3.5)
LYMPHOCYTES # BLD: 1.4 K/UL (ref 0.8–3.5)
LYMPHOCYTES # BLD: 1.5 K/UL (ref 0.8–3.5)
LYMPHOCYTES # BLD: 1.7 K/UL (ref 0.8–3.5)
LYMPHOCYTES # BLD: 1.8 K/UL (ref 0.8–3.5)
LYMPHOCYTES # BLD: 2.1 K/UL (ref 0.8–3.5)
LYMPHOCYTES NFR BLD AUTO: 31 %
LYMPHOCYTES NFR BLD: 13 % (ref 12–49)
LYMPHOCYTES NFR BLD: 15 % (ref 12–49)
LYMPHOCYTES NFR BLD: 16 % (ref 12–49)
LYMPHOCYTES NFR BLD: 21 % (ref 12–49)
LYMPHOCYTES NFR BLD: 22 % (ref 12–49)
LYMPHOCYTES NFR BLD: 23 % (ref 12–49)
LYMPHOCYTES NFR BLD: 24 % (ref 12–49)
LYMPHOCYTES NFR BLD: 24 % (ref 12–49)
LYMPHOCYTES NFR BLD: 25 % (ref 12–49)
LYMPHOCYTES NFR BLD: 29 % (ref 12–49)
LYMPHOCYTES NFR BLD: 4 % (ref 12–49)
LYMPHOCYTES NFR BLD: 4 % (ref 12–49)
LYMPHOCYTES NFR BLD: 5 % (ref 12–49)
MAGNESIUM SERPL-MCNC: 1.8 MG/DL (ref 1.6–2.4)
MAGNESIUM SERPL-MCNC: 1.9 MG/DL (ref 1.6–2.4)
MAGNESIUM SERPL-MCNC: 2 MG/DL (ref 1.6–2.4)
MAGNESIUM SERPL-MCNC: 2.2 MG/DL (ref 1.6–2.4)
MCH RBC QN AUTO: 30.2 PG (ref 26–34)
MCH RBC QN AUTO: 30.3 PG (ref 26–34)
MCH RBC QN AUTO: 30.8 PG (ref 26–34)
MCH RBC QN AUTO: 31 PG (ref 26.6–33)
MCH RBC QN AUTO: 31 PG (ref 26–34)
MCH RBC QN AUTO: 31 PG (ref 26–34)
MCH RBC QN AUTO: 31.3 PG (ref 26–34)
MCH RBC QN AUTO: 31.9 PG (ref 26–34)
MCH RBC QN AUTO: 32 PG (ref 26–34)
MCH RBC QN AUTO: 32.1 PG (ref 26–34)
MCH RBC QN AUTO: 32.4 PG (ref 26–34)
MCH RBC QN AUTO: 32.4 PG (ref 26–34)
MCH RBC QN AUTO: 32.5 PG (ref 26–34)
MCH RBC QN AUTO: 32.6 PG (ref 26–34)
MCH RBC QN AUTO: 32.6 PG (ref 26–34)
MCH RBC QN AUTO: 32.9 PG (ref 26–34)
MCH RBC QN AUTO: 33.2 PG (ref 26–34)
MCHC RBC AUTO-ENTMCNC: 31.3 G/DL (ref 30–36.5)
MCHC RBC AUTO-ENTMCNC: 33.3 G/DL (ref 30–36.5)
MCHC RBC AUTO-ENTMCNC: 33.7 G/DL (ref 30–36.5)
MCHC RBC AUTO-ENTMCNC: 33.8 G/DL (ref 30–36.5)
MCHC RBC AUTO-ENTMCNC: 33.8 G/DL (ref 30–36.5)
MCHC RBC AUTO-ENTMCNC: 34 G/DL (ref 30–36.5)
MCHC RBC AUTO-ENTMCNC: 34.4 G/DL (ref 30–36.5)
MCHC RBC AUTO-ENTMCNC: 34.5 G/DL (ref 30–36.5)
MCHC RBC AUTO-ENTMCNC: 34.7 G/DL (ref 30–36.5)
MCHC RBC AUTO-ENTMCNC: 34.7 G/DL (ref 31.5–35.7)
MCHC RBC AUTO-ENTMCNC: 34.9 G/DL (ref 30–36.5)
MCHC RBC AUTO-ENTMCNC: 35 G/DL (ref 30–36.5)
MCHC RBC AUTO-ENTMCNC: 35 G/DL (ref 30–36.5)
MCV RBC AUTO: 104 FL (ref 80–99)
MCV RBC AUTO: 88 FL (ref 80–99)
MCV RBC AUTO: 88.3 FL (ref 80–99)
MCV RBC AUTO: 89.4 FL (ref 80–99)
MCV RBC AUTO: 90 FL (ref 79–97)
MCV RBC AUTO: 90.3 FL (ref 80–99)
MCV RBC AUTO: 91.6 FL (ref 80–99)
MCV RBC AUTO: 92 FL (ref 80–99)
MCV RBC AUTO: 92.3 FL (ref 80–99)
MCV RBC AUTO: 92.7 FL (ref 80–99)
MCV RBC AUTO: 93 FL (ref 80–99)
MCV RBC AUTO: 93.5 FL (ref 80–99)
MCV RBC AUTO: 95 FL (ref 80–99)
MCV RBC AUTO: 95.3 FL (ref 80–99)
MCV RBC AUTO: 99.6 FL (ref 80–99)
METAMYELOCYTES NFR BLD MANUAL: 2 %
MONOCYTES # BLD AUTO: 0.7 X10E3/UL (ref 0.1–0.9)
MONOCYTES # BLD: 0.1 K/UL (ref 0–1)
MONOCYTES # BLD: 0.6 K/UL (ref 0–1)
MONOCYTES # BLD: 0.6 K/UL (ref 0–1)
MONOCYTES # BLD: 0.7 K/UL (ref 0–1)
MONOCYTES # BLD: 0.8 K/UL (ref 0–1)
MONOCYTES # BLD: 0.8 K/UL (ref 0–1)
MONOCYTES # BLD: 0.9 K/UL (ref 0–1)
MONOCYTES # BLD: 1 K/UL (ref 0–1)
MONOCYTES # BLD: 2 K/UL (ref 0–1)
MONOCYTES NFR BLD AUTO: 11 %
MONOCYTES NFR BLD: 11 % (ref 5–13)
MONOCYTES NFR BLD: 11 % (ref 5–13)
MONOCYTES NFR BLD: 12 % (ref 5–13)
MONOCYTES NFR BLD: 13 % (ref 5–13)
MONOCYTES NFR BLD: 14 % (ref 5–13)
MONOCYTES NFR BLD: 2 % (ref 5–13)
MONOCYTES NFR BLD: 5 % (ref 5–13)
MONOCYTES NFR BLD: 6 % (ref 5–13)
MONOCYTES NFR BLD: 9 % (ref 5–13)
NEUTROPHILS # BLD AUTO: 3.7 X10E3/UL (ref 1.4–7)
NEUTROPHILS NFR BLD AUTO: 53 %
NEUTS SEG # BLD: 12.5 K/UL (ref 1.8–8)
NEUTS SEG # BLD: 15.5 K/UL (ref 1.8–8)
NEUTS SEG # BLD: 18.6 K/UL (ref 1.8–8)
NEUTS SEG # BLD: 3.2 K/UL (ref 1.8–8)
NEUTS SEG # BLD: 3.3 K/UL (ref 1.8–8)
NEUTS SEG # BLD: 4 K/UL (ref 1.8–8)
NEUTS SEG # BLD: 4.3 K/UL (ref 1.8–8)
NEUTS SEG # BLD: 4.3 K/UL (ref 1.8–8)
NEUTS SEG # BLD: 4.6 K/UL (ref 1.8–8)
NEUTS SEG # BLD: 4.7 K/UL (ref 1.8–8)
NEUTS SEG # BLD: 5.5 K/UL (ref 1.8–8)
NEUTS SEG # BLD: 5.6 K/UL (ref 1.8–8)
NEUTS SEG # BLD: 7.3 K/UL (ref 1.8–8)
NEUTS SEG NFR BLD: 56 % (ref 32–75)
NEUTS SEG NFR BLD: 61 % (ref 32–75)
NEUTS SEG NFR BLD: 62 % (ref 32–75)
NEUTS SEG NFR BLD: 64 % (ref 32–75)
NEUTS SEG NFR BLD: 72 % (ref 32–75)
NEUTS SEG NFR BLD: 73 % (ref 32–75)
NEUTS SEG NFR BLD: 73 % (ref 32–75)
NEUTS SEG NFR BLD: 74 % (ref 32–75)
NEUTS SEG NFR BLD: 85 % (ref 32–75)
NEUTS SEG NFR BLD: 89 % (ref 32–75)
NEUTS SEG NFR BLD: 89 % (ref 32–75)
NITRITE UR QL STRIP.AUTO: NEGATIVE
NITRITE UR QL STRIP.AUTO: NEGATIVE
NRBC # BLD: 0 K/UL (ref 0–0.01)
NRBC # BLD: 0.04 K/UL (ref 0–0.01)
NRBC # BLD: 0.06 K/UL (ref 0–0.01)
NRBC BLD-RTO: 0 PER 100 WBC
NRBC BLD-RTO: 0.3 PER 100 WBC
NRBC BLD-RTO: 0.3 PER 100 WBC
O2/TOTAL GAS SETTING VFR VENT: 100 %
P-R INTERVAL, ECG05: 124 MS
P-R INTERVAL, ECG05: 126 MS
PCO2 BLD: 47.6 MMHG (ref 35–45)
PEEP RESPIRATORY: 5 CMH2O
PH BLD: 6.82 [PH] (ref 7.35–7.45)
PH UR STRIP: 5.5 [PH] (ref 5–8)
PH UR STRIP: 5.5 [PH] (ref 5–8)
PHOSPHATE SERPL-MCNC: 2.3 MG/DL (ref 2.6–4.7)
PHOSPHATE SERPL-MCNC: 2.4 MG/DL (ref 2.6–4.7)
PHOSPHATE SERPL-MCNC: 2.6 MG/DL (ref 2.6–4.7)
PHOSPHATE SERPL-MCNC: 2.6 MG/DL (ref 2.6–4.7)
PHOSPHATE SERPL-MCNC: 3.2 MG/DL (ref 2.6–4.7)
PLATELET # BLD AUTO: 106 K/UL (ref 150–400)
PLATELET # BLD AUTO: 115 K/UL (ref 150–400)
PLATELET # BLD AUTO: 116 K/UL (ref 150–400)
PLATELET # BLD AUTO: 131 K/UL (ref 150–400)
PLATELET # BLD AUTO: 139 K/UL (ref 150–400)
PLATELET # BLD AUTO: 140 K/UL (ref 150–400)
PLATELET # BLD AUTO: 145 K/UL (ref 150–400)
PLATELET # BLD AUTO: 149 K/UL (ref 150–400)
PLATELET # BLD AUTO: 149 X10E3/UL (ref 150–450)
PLATELET # BLD AUTO: 158 K/UL (ref 150–400)
PLATELET # BLD AUTO: 163 K/UL (ref 150–400)
PLATELET # BLD AUTO: 170 K/UL (ref 150–400)
PLATELET # BLD AUTO: 195 K/UL (ref 150–400)
PLATELET # BLD AUTO: 196 K/UL (ref 150–400)
PLATELET # BLD AUTO: 199 K/UL (ref 150–400)
PLATELET # BLD AUTO: 211 K/UL (ref 150–400)
PLATELET # BLD AUTO: 93 K/UL (ref 150–400)
PMV BLD AUTO: 10.3 FL (ref 8.9–12.9)
PMV BLD AUTO: 10.5 FL (ref 8.9–12.9)
PMV BLD AUTO: 10.6 FL (ref 8.9–12.9)
PMV BLD AUTO: 10.6 FL (ref 8.9–12.9)
PMV BLD AUTO: 10.9 FL (ref 8.9–12.9)
PMV BLD AUTO: 11.2 FL (ref 8.9–12.9)
PMV BLD AUTO: 11.2 FL (ref 8.9–12.9)
PMV BLD AUTO: 11.3 FL (ref 8.9–12.9)
PMV BLD AUTO: 11.8 FL (ref 8.9–12.9)
PMV BLD AUTO: 11.9 FL (ref 8.9–12.9)
PMV BLD AUTO: 12.3 FL (ref 8.9–12.9)
PMV BLD AUTO: 12.3 FL (ref 8.9–12.9)
PMV BLD AUTO: 12.7 FL (ref 8.9–12.9)
PMV BLD AUTO: 9.8 FL (ref 8.9–12.9)
PO2 BLD: 550 MMHG (ref 80–100)
POTASSIUM SERPL-SCNC: 3.2 MMOL/L (ref 3.5–5.1)
POTASSIUM SERPL-SCNC: 3.3 MMOL/L (ref 3.5–5.1)
POTASSIUM SERPL-SCNC: 3.4 MMOL/L (ref 3.5–5.1)
POTASSIUM SERPL-SCNC: 3.5 MMOL/L (ref 3.5–5.1)
POTASSIUM SERPL-SCNC: 3.5 MMOL/L (ref 3.5–5.1)
POTASSIUM SERPL-SCNC: 3.6 MMOL/L (ref 3.5–5.1)
POTASSIUM SERPL-SCNC: 3.8 MMOL/L (ref 3.5–5.1)
POTASSIUM SERPL-SCNC: 3.8 MMOL/L (ref 3.5–5.1)
POTASSIUM SERPL-SCNC: 4 MMOL/L (ref 3.5–5.1)
POTASSIUM SERPL-SCNC: 4.1 MMOL/L (ref 3.5–5.1)
POTASSIUM SERPL-SCNC: 4.2 MMOL/L (ref 3.5–5.2)
POTASSIUM SERPL-SCNC: 4.3 MMOL/L (ref 3.5–5.1)
POTASSIUM SERPL-SCNC: 4.3 MMOL/L (ref 3.5–5.1)
PROT SERPL-MCNC: 4.6 G/DL (ref 6.4–8.2)
PROT SERPL-MCNC: 6 G/DL (ref 6.4–8.2)
PROT SERPL-MCNC: 6 G/DL (ref 6.4–8.2)
PROT SERPL-MCNC: 6.1 G/DL (ref 6.4–8.2)
PROT SERPL-MCNC: 6.1 G/DL (ref 6.4–8.2)
PROT SERPL-MCNC: 6.4 G/DL (ref 6.4–8.2)
PROT SERPL-MCNC: 6.5 G/DL (ref 6.4–8.2)
PROT SERPL-MCNC: 6.5 G/DL (ref 6.4–8.2)
PROT SERPL-MCNC: 6.7 G/DL (ref 6.4–8.2)
PROT SERPL-MCNC: 6.7 G/DL (ref 6.4–8.2)
PROT SERPL-MCNC: 6.8 G/DL (ref 6.4–8.2)
PROT SERPL-MCNC: 7 G/DL (ref 6–8.5)
PROT SERPL-MCNC: 7.1 G/DL (ref 6.4–8.2)
PROT UR STRIP-MCNC: 30 MG/DL
PROT UR STRIP-MCNC: NEGATIVE MG/DL
PSA SERPL-MCNC: <0.1 NG/ML (ref 0–4)
Q-T INTERVAL, ECG07: 304 MS
Q-T INTERVAL, ECG07: 330 MS
QRS DURATION, ECG06: 84 MS
QRS DURATION, ECG06: 86 MS
QTC CALCULATION (BEZET), ECG08: 438 MS
QTC CALCULATION (BEZET), ECG08: 462 MS
RBC # BLD AUTO: 2.56 M/UL (ref 4.1–5.7)
RBC # BLD AUTO: 2.59 M/UL (ref 4.1–5.7)
RBC # BLD AUTO: 2.79 M/UL (ref 4.1–5.7)
RBC # BLD AUTO: 3.19 M/UL (ref 4.1–5.7)
RBC # BLD AUTO: 3.41 M/UL (ref 4.1–5.7)
RBC # BLD AUTO: 3.46 M/UL (ref 4.1–5.7)
RBC # BLD AUTO: 3.5 M/UL (ref 4.1–5.7)
RBC # BLD AUTO: 3.52 M/UL (ref 4.1–5.7)
RBC # BLD AUTO: 3.7 M/UL (ref 4.1–5.7)
RBC # BLD AUTO: 3.74 M/UL (ref 4.1–5.7)
RBC # BLD AUTO: 3.87 M/UL (ref 4.1–5.7)
RBC # BLD AUTO: 4 M/UL (ref 4.1–5.7)
RBC # BLD AUTO: 4.09 M/UL (ref 4.1–5.7)
RBC # BLD AUTO: 4.1 M/UL (ref 4.1–5.7)
RBC # BLD AUTO: 4.26 M/UL (ref 4.1–5.7)
RBC # BLD AUTO: 4.35 M/UL (ref 4.1–5.7)
RBC # BLD AUTO: 4.51 X10E6/UL (ref 4.14–5.8)
RBC #/AREA URNS HPF: ABNORMAL /HPF (ref 0–5)
RBC #/AREA URNS HPF: NORMAL /HPF (ref 0–5)
RBC MORPH BLD: ABNORMAL
SAMPLES BEING HELD,HOLD: NORMAL
SAO2 % BLD: 99.9 % (ref 92–97)
SERVICE CMNT-IMP: ABNORMAL
SERVICE CMNT-IMP: NORMAL
SERVICE CMNT-IMP: NORMAL
SODIUM SERPL-SCNC: 127 MMOL/L (ref 136–145)
SODIUM SERPL-SCNC: 129 MMOL/L (ref 136–145)
SODIUM SERPL-SCNC: 130 MMOL/L (ref 136–145)
SODIUM SERPL-SCNC: 131 MMOL/L (ref 136–145)
SODIUM SERPL-SCNC: 131 MMOL/L (ref 136–145)
SODIUM SERPL-SCNC: 132 MMOL/L (ref 136–145)
SODIUM SERPL-SCNC: 133 MMOL/L (ref 136–145)
SODIUM SERPL-SCNC: 134 MMOL/L (ref 136–145)
SODIUM SERPL-SCNC: 135 MMOL/L (ref 136–145)
SODIUM SERPL-SCNC: 137 MMOL/L (ref 134–144)
SODIUM SERPL-SCNC: 138 MMOL/L (ref 136–145)
SODIUM SERPL-SCNC: 138 MMOL/L (ref 136–145)
SODIUM SERPL-SCNC: 139 MMOL/L (ref 136–145)
SODIUM SERPL-SCNC: 140 MMOL/L (ref 136–145)
SODIUM SERPL-SCNC: 143 MMOL/L (ref 136–145)
SP GR UR REFRACTOMETRY: 1.02 (ref 1–1.03)
SP GR UR REFRACTOMETRY: <1.005 (ref 1–1.03)
SPECIMEN TYPE: ABNORMAL
TROPONIN-HIGH SENSITIVITY: 41 NG/L (ref 0–76)
TROPONIN-HIGH SENSITIVITY: 8 NG/L (ref 0–76)
UA: UC IF INDICATED,UAUC: ABNORMAL
UR CULT HOLD, URHOLD: NORMAL
UROBILINOGEN UR QL STRIP.AUTO: 0.2 EU/DL (ref 0.2–1)
UROBILINOGEN UR QL STRIP.AUTO: 1 EU/DL (ref 0.2–1)
VENTILATION MODE VENT: ABNORMAL
VENTRICULAR RATE, ECG03: 118 BPM
VENTRICULAR RATE, ECG03: 125 BPM
VT SETTING VENT: 450 ML
WBC # BLD AUTO: 10 K/UL (ref 4.1–11.1)
WBC # BLD AUTO: 10.5 K/UL (ref 4.1–11.1)
WBC # BLD AUTO: 14 K/UL (ref 4.1–11.1)
WBC # BLD AUTO: 17.4 K/UL (ref 4.1–11.1)
WBC # BLD AUTO: 21.9 K/UL (ref 4.1–11.1)
WBC # BLD AUTO: 4.6 K/UL (ref 4.1–11.1)
WBC # BLD AUTO: 5.5 K/UL (ref 4.1–11.1)
WBC # BLD AUTO: 6.6 K/UL (ref 4.1–11.1)
WBC # BLD AUTO: 6.8 X10E3/UL (ref 3.4–10.8)
WBC # BLD AUTO: 7.1 K/UL (ref 4.1–11.1)
WBC # BLD AUTO: 7.2 K/UL (ref 4.1–11.1)
WBC # BLD AUTO: 7.4 K/UL (ref 4.1–11.1)
WBC # BLD AUTO: 7.6 K/UL (ref 4.1–11.1)
WBC # BLD AUTO: 7.6 K/UL (ref 4.1–11.1)
WBC # BLD AUTO: 7.7 K/UL (ref 4.1–11.1)
WBC # BLD AUTO: 7.8 K/UL (ref 4.1–11.1)
WBC # BLD AUTO: 8.7 K/UL (ref 4.1–11.1)
WBC URNS QL MICRO: >100 /HPF (ref 0–4)
WBC URNS QL MICRO: NORMAL /HPF (ref 0–4)

## 2022-01-01 PROCEDURE — 83735 ASSAY OF MAGNESIUM: CPT

## 2022-01-01 PROCEDURE — 36415 COLL VENOUS BLD VENIPUNCTURE: CPT

## 2022-01-01 PROCEDURE — 74011250636 HC RX REV CODE- 250/636: Performed by: INTERNAL MEDICINE

## 2022-01-01 PROCEDURE — 1123F ACP DISCUSS/DSCN MKR DOCD: CPT | Performed by: INTERNAL MEDICINE

## 2022-01-01 PROCEDURE — 74178 CT ABD&PLV WO CNTR FLWD CNTR: CPT

## 2022-01-01 PROCEDURE — 85027 COMPLETE CBC AUTOMATED: CPT

## 2022-01-01 PROCEDURE — 85025 COMPLETE CBC W/AUTO DIFF WBC: CPT

## 2022-01-01 PROCEDURE — 74011000258 HC RX REV CODE- 258: Performed by: NURSE PRACTITIONER

## 2022-01-01 PROCEDURE — C1751 CATH, INF, PER/CENT/MIDLINE: HCPCS

## 2022-01-01 PROCEDURE — 99222 1ST HOSP IP/OBS MODERATE 55: CPT | Performed by: INTERNAL MEDICINE

## 2022-01-01 PROCEDURE — 84100 ASSAY OF PHOSPHORUS: CPT

## 2022-01-01 PROCEDURE — G8420 CALC BMI NORM PARAMETERS: HCPCS | Performed by: INTERNAL MEDICINE

## 2022-01-01 PROCEDURE — 96416 CHEMO PROLONG INFUSE W/PUMP: CPT

## 2022-01-01 PROCEDURE — 1124F ACP DISCUSS-NO DSCNMKR DOCD: CPT | Performed by: NURSE PRACTITIONER

## 2022-01-01 PROCEDURE — A9552 F18 FDG: HCPCS

## 2022-01-01 PROCEDURE — 74011250637 HC RX REV CODE- 250/637: Performed by: INTERNAL MEDICINE

## 2022-01-01 PROCEDURE — 74011000250 HC RX REV CODE- 250: Performed by: INTERNAL MEDICINE

## 2022-01-01 PROCEDURE — 74011250636 HC RX REV CODE- 250/636: Performed by: STUDENT IN AN ORGANIZED HEALTH CARE EDUCATION/TRAINING PROGRAM

## 2022-01-01 PROCEDURE — G8536 NO DOC ELDER MAL SCRN: HCPCS | Performed by: INTERNAL MEDICINE

## 2022-01-01 PROCEDURE — 96413 CHEMO IV INFUSION 1 HR: CPT

## 2022-01-01 PROCEDURE — 74011000258 HC RX REV CODE- 258: Performed by: STUDENT IN AN ORGANIZED HEALTH CARE EDUCATION/TRAINING PROGRAM

## 2022-01-01 PROCEDURE — G8754 DIAS BP LESS 90: HCPCS | Performed by: INTERNAL MEDICINE

## 2022-01-01 PROCEDURE — 65270000046 HC RM TELEMETRY

## 2022-01-01 PROCEDURE — 99232 SBSQ HOSP IP/OBS MODERATE 35: CPT | Performed by: SURGERY

## 2022-01-01 PROCEDURE — G8752 SYS BP LESS 140: HCPCS | Performed by: INTERNAL MEDICINE

## 2022-01-01 PROCEDURE — 74011000258 HC RX REV CODE- 258: Performed by: INTERNAL MEDICINE

## 2022-01-01 PROCEDURE — 74019 RADEX ABDOMEN 2 VIEWS: CPT

## 2022-01-01 PROCEDURE — 80053 COMPREHEN METABOLIC PANEL: CPT

## 2022-01-01 PROCEDURE — G8432 DEP SCR NOT DOC, RNG: HCPCS | Performed by: INTERNAL MEDICINE

## 2022-01-01 PROCEDURE — 74011250636 HC RX REV CODE- 250/636: Performed by: HOSPITALIST

## 2022-01-01 PROCEDURE — 80048 BASIC METABOLIC PNL TOTAL CA: CPT

## 2022-01-01 PROCEDURE — 0BH17EZ INSERTION OF ENDOTRACHEAL AIRWAY INTO TRACHEA, VIA NATURAL OR ARTIFICIAL OPENING: ICD-10-PCS | Performed by: INTERNAL MEDICINE

## 2022-01-01 PROCEDURE — 87077 CULTURE AEROBIC IDENTIFY: CPT

## 2022-01-01 PROCEDURE — 74011000250 HC RX REV CODE- 250: Performed by: NURSE PRACTITIONER

## 2022-01-01 PROCEDURE — 86706 HEP B SURFACE ANTIBODY: CPT

## 2022-01-01 PROCEDURE — 77030031139 HC SUT VCRL2 J&J -A

## 2022-01-01 PROCEDURE — 96375 TX/PRO/DX INJ NEW DRUG ADDON: CPT

## 2022-01-01 PROCEDURE — 51798 US URINE CAPACITY MEASURE: CPT

## 2022-01-01 PROCEDURE — 74011000636 HC RX REV CODE- 636: Performed by: STUDENT IN AN ORGANIZED HEALTH CARE EDUCATION/TRAINING PROGRAM

## 2022-01-01 PROCEDURE — 74011000258 HC RX REV CODE- 258: Performed by: HOSPITALIST

## 2022-01-01 PROCEDURE — 74011000250 HC RX REV CODE- 250: Performed by: STUDENT IN AN ORGANIZED HEALTH CARE EDUCATION/TRAINING PROGRAM

## 2022-01-01 PROCEDURE — 74011250636 HC RX REV CODE- 250/636

## 2022-01-01 PROCEDURE — 74011250636 HC RX REV CODE- 250/636: Performed by: NURSE PRACTITIONER

## 2022-01-01 PROCEDURE — 77030012965 HC NDL HUBR BBMI -A

## 2022-01-01 PROCEDURE — 65270000032 HC RM SEMIPRIVATE

## 2022-01-01 PROCEDURE — 82803 BLOOD GASES ANY COMBINATION: CPT

## 2022-01-01 PROCEDURE — 96417 CHEMO IV INFUS EACH ADDL SEQ: CPT

## 2022-01-01 PROCEDURE — 36573 INSJ PICC RS&I 5 YR+: CPT | Performed by: INTERNAL MEDICINE

## 2022-01-01 PROCEDURE — 36600 WITHDRAWAL OF ARTERIAL BLOOD: CPT

## 2022-01-01 PROCEDURE — 74011250636 HC RX REV CODE- 250/636: Performed by: EMERGENCY MEDICINE

## 2022-01-01 PROCEDURE — 74177 CT ABD & PELVIS W/CONTRAST: CPT

## 2022-01-01 PROCEDURE — 1124F ACP DISCUSS-NO DSCNMKR DOCD: CPT | Performed by: INTERNAL MEDICINE

## 2022-01-01 PROCEDURE — G8754 DIAS BP LESS 90: HCPCS | Performed by: NURSE PRACTITIONER

## 2022-01-01 PROCEDURE — 81001 URINALYSIS AUTO W/SCOPE: CPT

## 2022-01-01 PROCEDURE — 96360 HYDRATION IV INFUSION INIT: CPT

## 2022-01-01 PROCEDURE — 74011250636 HC RX REV CODE- 250/636: Performed by: NURSE ANESTHETIST, CERTIFIED REGISTERED

## 2022-01-01 PROCEDURE — 1101F PT FALLS ASSESS-DOCD LE1/YR: CPT | Performed by: NURSE PRACTITIONER

## 2022-01-01 PROCEDURE — G8427 DOCREV CUR MEDS BY ELIG CLIN: HCPCS | Performed by: INTERNAL MEDICINE

## 2022-01-01 PROCEDURE — 74018 RADEX ABDOMEN 1 VIEW: CPT

## 2022-01-01 PROCEDURE — G8536 NO DOC ELDER MAL SCRN: HCPCS | Performed by: NURSE PRACTITIONER

## 2022-01-01 PROCEDURE — 77030010507 HC ADH SKN DERMBND J&J -B

## 2022-01-01 PROCEDURE — 76060000031 HC ANESTHESIA FIRST 0.5 HR: Performed by: INTERNAL MEDICINE

## 2022-01-01 PROCEDURE — 83605 ASSAY OF LACTIC ACID: CPT

## 2022-01-01 PROCEDURE — 99215 OFFICE O/P EST HI 40 MIN: CPT | Performed by: INTERNAL MEDICINE

## 2022-01-01 PROCEDURE — 99285 EMERGENCY DEPT VISIT HI MDM: CPT

## 2022-01-01 PROCEDURE — G8752 SYS BP LESS 140: HCPCS | Performed by: NURSE PRACTITIONER

## 2022-01-01 PROCEDURE — 99214 OFFICE O/P EST MOD 30 MIN: CPT | Performed by: INTERNAL MEDICINE

## 2022-01-01 PROCEDURE — 36592 COLLECT BLOOD FROM PICC: CPT

## 2022-01-01 PROCEDURE — 76040000019: Performed by: INTERNAL MEDICINE

## 2022-01-01 PROCEDURE — 82962 GLUCOSE BLOOD TEST: CPT

## 2022-01-01 PROCEDURE — 74011000636 HC RX REV CODE- 636: Performed by: SURGERY

## 2022-01-01 PROCEDURE — 99232 SBSQ HOSP IP/OBS MODERATE 35: CPT | Performed by: INTERNAL MEDICINE

## 2022-01-01 PROCEDURE — G8427 DOCREV CUR MEDS BY ELIG CLIN: HCPCS | Performed by: NURSE PRACTITIONER

## 2022-01-01 PROCEDURE — 36561 INSERT TUNNELED CV CATH: CPT

## 2022-01-01 PROCEDURE — 99222 1ST HOSP IP/OBS MODERATE 55: CPT | Performed by: SURGERY

## 2022-01-01 PROCEDURE — 83690 ASSAY OF LIPASE: CPT

## 2022-01-01 PROCEDURE — 86704 HEP B CORE ANTIBODY TOTAL: CPT

## 2022-01-01 PROCEDURE — 93005 ELECTROCARDIOGRAM TRACING: CPT

## 2022-01-01 PROCEDURE — G8510 SCR DEP NEG, NO PLAN REQD: HCPCS | Performed by: INTERNAL MEDICINE

## 2022-01-01 PROCEDURE — 2709999900 HC NON-CHARGEABLE SUPPLY: Performed by: INTERNAL MEDICINE

## 2022-01-01 PROCEDURE — 1101F PT FALLS ASSESS-DOCD LE1/YR: CPT | Performed by: INTERNAL MEDICINE

## 2022-01-01 PROCEDURE — 85379 FIBRIN DEGRADATION QUANT: CPT

## 2022-01-01 PROCEDURE — 5A1935Z RESPIRATORY VENTILATION, LESS THAN 24 CONSECUTIVE HOURS: ICD-10-PCS | Performed by: INTERNAL MEDICINE

## 2022-01-01 PROCEDURE — 99213 OFFICE O/P EST LOW 20 MIN: CPT | Performed by: NURSE PRACTITIONER

## 2022-01-01 PROCEDURE — 97165 OT EVAL LOW COMPLEX 30 MIN: CPT

## 2022-01-01 PROCEDURE — 94002 VENT MGMT INPAT INIT DAY: CPT

## 2022-01-01 PROCEDURE — 84484 ASSAY OF TROPONIN QUANT: CPT

## 2022-01-01 PROCEDURE — 96523 IRRIG DRUG DELIVERY DEVICE: CPT

## 2022-01-01 PROCEDURE — 96361 HYDRATE IV INFUSION ADD-ON: CPT

## 2022-01-01 PROCEDURE — 71045 X-RAY EXAM CHEST 1 VIEW: CPT

## 2022-01-01 PROCEDURE — 77030011893 HC TY CUT DN TRIS -B

## 2022-01-01 PROCEDURE — C1788 PORT, INDWELLING, IMP: HCPCS

## 2022-01-01 PROCEDURE — 77030036673 HC NDL BIOP ENDOSC SHRKCOR PRELD COVD -E: Performed by: INTERNAL MEDICINE

## 2022-01-01 PROCEDURE — 87086 URINE CULTURE/COLONY COUNT: CPT

## 2022-01-01 PROCEDURE — 65270000029 HC RM PRIVATE

## 2022-01-01 PROCEDURE — 74183 MRI ABD W/O CNTR FLWD CNTR: CPT

## 2022-01-01 PROCEDURE — A9576 INJ PROHANCE MULTIPACK: HCPCS

## 2022-01-01 PROCEDURE — C1894 INTRO/SHEATH, NON-LASER: HCPCS

## 2022-01-01 PROCEDURE — G8420 CALC BMI NORM PARAMETERS: HCPCS | Performed by: NURSE PRACTITIONER

## 2022-01-01 PROCEDURE — 71275 CT ANGIOGRAPHY CHEST: CPT

## 2022-01-01 PROCEDURE — 71250 CT THORAX DX C-: CPT

## 2022-01-01 PROCEDURE — 74011000250 HC RX REV CODE- 250: Performed by: NURSE ANESTHETIST, CERTIFIED REGISTERED

## 2022-01-01 PROCEDURE — 74011000636 HC RX REV CODE- 636: Performed by: INTERNAL MEDICINE

## 2022-01-01 PROCEDURE — 77030041376 HC FLTR VENTLTR EXPTRY MEDT -B

## 2022-01-01 PROCEDURE — 97110 THERAPEUTIC EXERCISES: CPT

## 2022-01-01 PROCEDURE — 74011250637 HC RX REV CODE- 250/637: Performed by: HOSPITALIST

## 2022-01-01 PROCEDURE — 97161 PT EVAL LOW COMPLEX 20 MIN: CPT

## 2022-01-01 PROCEDURE — 97116 GAIT TRAINING THERAPY: CPT

## 2022-01-01 PROCEDURE — 96415 CHEMO IV INFUSION ADDL HR: CPT

## 2022-01-01 PROCEDURE — 77030020365 HC SOL INJ SOD CL 0.9% 50ML

## 2022-01-01 PROCEDURE — 2709999900 HC NON-CHARGEABLE SUPPLY

## 2022-01-01 PROCEDURE — 97535 SELF CARE MNGMENT TRAINING: CPT

## 2022-01-01 PROCEDURE — 87040 BLOOD CULTURE FOR BACTERIA: CPT

## 2022-01-01 PROCEDURE — 74176 CT ABD & PELVIS W/O CONTRAST: CPT

## 2022-01-01 PROCEDURE — 65610000006 HC RM INTENSIVE CARE

## 2022-01-01 PROCEDURE — 88341 IMHCHEM/IMCYTCHM EA ADD ANTB: CPT

## 2022-01-01 PROCEDURE — 74011250636 HC RX REV CODE- 250/636: Performed by: PHYSICIAN ASSISTANT

## 2022-01-01 PROCEDURE — 02HV33Z INSERTION OF INFUSION DEVICE INTO SUPERIOR VENA CAVA, PERCUTANEOUS APPROACH: ICD-10-PCS | Performed by: INTERNAL MEDICINE

## 2022-01-01 PROCEDURE — G8432 DEP SCR NOT DOC, RNG: HCPCS | Performed by: NURSE PRACTITIONER

## 2022-01-01 PROCEDURE — 87186 SC STD MICRODIL/AGAR DIL: CPT

## 2022-01-01 PROCEDURE — 86301 IMMUNOASSAY TUMOR CA 19-9: CPT

## 2022-01-01 PROCEDURE — 74011000636 HC RX REV CODE- 636: Performed by: REGISTERED NURSE

## 2022-01-01 PROCEDURE — 99205 OFFICE O/P NEW HI 60 MIN: CPT | Performed by: INTERNAL MEDICINE

## 2022-01-01 PROCEDURE — 87340 HEPATITIS B SURFACE AG IA: CPT

## 2022-01-01 PROCEDURE — 99222 1ST HOSP IP/OBS MODERATE 55: CPT | Performed by: PHYSICAL MEDICINE & REHABILITATION

## 2022-01-01 PROCEDURE — 70450 CT HEAD/BRAIN W/O DYE: CPT

## 2022-01-01 PROCEDURE — 77030020847 HC STATLOK BARD -A

## 2022-01-01 PROCEDURE — 88307 TISSUE EXAM BY PATHOLOGIST: CPT

## 2022-01-01 PROCEDURE — 76937 US GUIDE VASCULAR ACCESS: CPT

## 2022-01-01 PROCEDURE — 88342 IMHCHEM/IMCYTCHM 1ST ANTB: CPT

## 2022-01-01 PROCEDURE — 74011000636 HC RX REV CODE- 636: Performed by: RADIOLOGY

## 2022-01-01 RX ORDER — DIPHENHYDRAMINE HYDROCHLORIDE 50 MG/ML
25 INJECTION, SOLUTION INTRAMUSCULAR; INTRAVENOUS AS NEEDED
Status: CANCELLED
Start: 2022-01-01

## 2022-01-01 RX ORDER — LIDOCAINE HYDROCHLORIDE 20 MG/ML
20 INJECTION, SOLUTION INFILTRATION; PERINEURAL
Status: COMPLETED | OUTPATIENT
Start: 2022-01-01 | End: 2022-01-01

## 2022-01-01 RX ORDER — ONDANSETRON 4 MG/1
4 TABLET, ORALLY DISINTEGRATING ORAL
Status: DISCONTINUED | OUTPATIENT
Start: 2022-01-01 | End: 2022-09-13 | Stop reason: HOSPADM

## 2022-01-01 RX ORDER — LORAZEPAM 2 MG/ML
0.5 INJECTION INTRAMUSCULAR
Status: CANCELLED
Start: 2022-01-01

## 2022-01-01 RX ORDER — OXYCODONE HYDROCHLORIDE 5 MG/1
5 CAPSULE ORAL
Qty: 90 CAPSULE | Refills: 0 | Status: SHIPPED | OUTPATIENT
Start: 2022-01-01 | End: 2022-01-01 | Stop reason: ALTCHOICE

## 2022-01-01 RX ORDER — DIPHENHYDRAMINE HYDROCHLORIDE 50 MG/ML
50 INJECTION, SOLUTION INTRAMUSCULAR; INTRAVENOUS AS NEEDED
Status: CANCELLED
Start: 2022-01-01

## 2022-01-01 RX ORDER — ALBUTEROL SULFATE 0.83 MG/ML
2.5 SOLUTION RESPIRATORY (INHALATION) AS NEEDED
Status: CANCELLED
Start: 2022-01-01

## 2022-01-01 RX ORDER — SODIUM CHLORIDE 9 MG/ML
25 INJECTION, SOLUTION INTRAVENOUS CONTINUOUS
Status: DISPENSED | OUTPATIENT
Start: 2022-01-01 | End: 2022-01-01

## 2022-01-01 RX ORDER — HYDROCORTISONE SODIUM SUCCINATE 100 MG/2ML
100 INJECTION, POWDER, FOR SOLUTION INTRAMUSCULAR; INTRAVENOUS AS NEEDED
Status: CANCELLED | OUTPATIENT
Start: 2022-01-01

## 2022-01-01 RX ORDER — ATROPINE SULFATE 0.1 MG/ML
0.5 INJECTION INTRAVENOUS
Status: DISCONTINUED | OUTPATIENT
Start: 2022-01-01 | End: 2022-01-01 | Stop reason: HOSPADM

## 2022-01-01 RX ORDER — SODIUM CHLORIDE 9 MG/ML
5-250 INJECTION, SOLUTION INTRAVENOUS AS NEEDED
Status: CANCELLED | OUTPATIENT
Start: 2022-01-01

## 2022-01-01 RX ORDER — PROPOFOL 10 MG/ML
0-50 VIAL (ML) INTRAVENOUS
Status: DISCONTINUED | OUTPATIENT
Start: 2022-01-01 | End: 2022-09-13 | Stop reason: HOSPADM

## 2022-01-01 RX ORDER — PALONOSETRON 0.05 MG/ML
0.25 INJECTION, SOLUTION INTRAVENOUS ONCE
Status: COMPLETED | OUTPATIENT
Start: 2022-01-01 | End: 2022-01-01

## 2022-01-01 RX ORDER — SODIUM CHLORIDE 0.9 % (FLUSH) 0.9 %
5-40 SYRINGE (ML) INJECTION AS NEEDED
Status: CANCELLED | OUTPATIENT
Start: 2022-01-01

## 2022-01-01 RX ORDER — SODIUM CHLORIDE 0.9 % (FLUSH) 0.9 %
5-40 SYRINGE (ML) INJECTION AS NEEDED
Status: DISCONTINUED | OUTPATIENT
Start: 2022-01-01 | End: 2022-01-01 | Stop reason: HOSPADM

## 2022-01-01 RX ORDER — SODIUM CHLORIDE 9 MG/ML
10 INJECTION INTRAMUSCULAR; INTRAVENOUS; SUBCUTANEOUS AS NEEDED
Status: CANCELLED | OUTPATIENT
Start: 2022-01-01

## 2022-01-01 RX ORDER — ACETAMINOPHEN 325 MG/1
650 TABLET ORAL AS NEEDED
Status: ACTIVE | OUTPATIENT
Start: 2022-01-01 | End: 2022-01-01

## 2022-01-01 RX ORDER — DEXAMETHASONE SODIUM PHOSPHATE 100 MG/10ML
10 INJECTION INTRAMUSCULAR; INTRAVENOUS ONCE
Status: CANCELLED
Start: 2022-01-01 | End: 2022-01-01

## 2022-01-01 RX ORDER — DIPHENHYDRAMINE HYDROCHLORIDE 50 MG/ML
25 INJECTION, SOLUTION INTRAMUSCULAR; INTRAVENOUS AS NEEDED
Status: ACTIVE | OUTPATIENT
Start: 2022-01-01 | End: 2022-01-01

## 2022-01-01 RX ORDER — SODIUM CHLORIDE 9 MG/ML
5-40 INJECTION INTRAMUSCULAR; INTRAVENOUS; SUBCUTANEOUS AS NEEDED
Status: DISCONTINUED | OUTPATIENT
Start: 2022-01-01 | End: 2022-01-01 | Stop reason: HOSPADM

## 2022-01-01 RX ORDER — SODIUM CHLORIDE 0.9 % (FLUSH) 0.9 %
10 SYRINGE (ML) INJECTION AS NEEDED
Status: DISPENSED | OUTPATIENT
Start: 2022-01-01 | End: 2022-01-01

## 2022-01-01 RX ORDER — POTASSIUM CHLORIDE 7.45 MG/ML
10 INJECTION INTRAVENOUS
Status: COMPLETED | OUTPATIENT
Start: 2022-01-01 | End: 2022-01-01

## 2022-01-01 RX ORDER — EPINEPHRINE 1 MG/ML
0.3 INJECTION, SOLUTION, CONCENTRATE INTRAVENOUS AS NEEDED
Status: CANCELLED | OUTPATIENT
Start: 2022-01-01

## 2022-01-01 RX ORDER — ONDANSETRON 2 MG/ML
8 INJECTION INTRAMUSCULAR; INTRAVENOUS AS NEEDED
Status: CANCELLED | OUTPATIENT
Start: 2022-09-13

## 2022-01-01 RX ORDER — EPINEPHRINE 1 MG/ML
0.3 INJECTION, SOLUTION, CONCENTRATE INTRAVENOUS AS NEEDED
Status: DISCONTINUED | OUTPATIENT
Start: 2022-01-01 | End: 2022-01-01 | Stop reason: HOSPADM

## 2022-01-01 RX ORDER — PANCRELIPASE 36000; 180000; 114000 [USP'U]/1; [USP'U]/1; [USP'U]/1
8 CAPSULE, DELAYED RELEASE PELLETS ORAL
Qty: 240 CAPSULE | Refills: 3 | Status: SHIPPED | OUTPATIENT
Start: 2022-01-01

## 2022-01-01 RX ORDER — HEPARIN 100 UNIT/ML
500 SYRINGE INTRAVENOUS AS NEEDED
Status: CANCELLED
Start: 2022-01-01

## 2022-01-01 RX ORDER — ONDANSETRON 2 MG/ML
8 INJECTION INTRAMUSCULAR; INTRAVENOUS AS NEEDED
Status: ACTIVE | OUTPATIENT
Start: 2022-01-01 | End: 2022-01-01

## 2022-01-01 RX ORDER — SODIUM CHLORIDE 9 MG/ML
5-250 INJECTION, SOLUTION INTRAVENOUS AS NEEDED
Status: CANCELLED | OUTPATIENT
Start: 2022-09-13

## 2022-01-01 RX ORDER — SODIUM CHLORIDE 9 MG/ML
10 INJECTION INTRAMUSCULAR; INTRAVENOUS; SUBCUTANEOUS AS NEEDED
Status: ACTIVE | OUTPATIENT
Start: 2022-01-01 | End: 2022-01-01

## 2022-01-01 RX ORDER — HEPARIN 100 UNIT/ML
300-500 SYRINGE INTRAVENOUS AS NEEDED
Status: CANCELLED
Start: 2022-01-01

## 2022-01-01 RX ORDER — SODIUM CHLORIDE 9 MG/ML
5-250 INJECTION, SOLUTION INTRAVENOUS AS NEEDED
Status: DISPENSED | OUTPATIENT
Start: 2022-01-01 | End: 2022-01-01

## 2022-01-01 RX ORDER — SODIUM CHLORIDE 9 MG/ML
5-40 INJECTION INTRAMUSCULAR; INTRAVENOUS; SUBCUTANEOUS AS NEEDED
Status: ACTIVE | OUTPATIENT
Start: 2022-01-01 | End: 2022-01-01

## 2022-01-01 RX ORDER — HYDROMORPHONE HYDROCHLORIDE 1 MG/ML
0.5 INJECTION, SOLUTION INTRAMUSCULAR; INTRAVENOUS; SUBCUTANEOUS
Status: DISCONTINUED | OUTPATIENT
Start: 2022-01-01 | End: 2022-09-13 | Stop reason: HOSPADM

## 2022-01-01 RX ORDER — SODIUM CHLORIDE 0.9 % (FLUSH) 0.9 %
5-40 SYRINGE (ML) INJECTION AS NEEDED
Status: DISPENSED | OUTPATIENT
Start: 2022-01-01 | End: 2022-01-01

## 2022-01-01 RX ORDER — DIPHENHYDRAMINE HYDROCHLORIDE 50 MG/ML
25 INJECTION, SOLUTION INTRAMUSCULAR; INTRAVENOUS
Status: CANCELLED
Start: 2022-01-01

## 2022-01-01 RX ORDER — DEXAMETHASONE 4 MG/1
TABLET ORAL
Qty: 30 TABLET | Refills: 2 | Status: SHIPPED | OUTPATIENT
Start: 2022-01-01

## 2022-01-01 RX ORDER — ACETAMINOPHEN 325 MG/1
650 TABLET ORAL AS NEEDED
Status: CANCELLED
Start: 2022-01-01

## 2022-01-01 RX ORDER — ONDANSETRON 2 MG/ML
8 INJECTION INTRAMUSCULAR; INTRAVENOUS AS NEEDED
Status: CANCELLED | OUTPATIENT
Start: 2022-01-01

## 2022-01-01 RX ORDER — ONDANSETRON 2 MG/ML
8 INJECTION INTRAMUSCULAR; INTRAVENOUS ONCE
Status: COMPLETED | OUTPATIENT
Start: 2022-01-01 | End: 2022-01-01

## 2022-01-01 RX ORDER — SODIUM CHLORIDE 0.9 % (FLUSH) 0.9 %
10 SYRINGE (ML) INJECTION AS NEEDED
Status: DISCONTINUED | OUTPATIENT
Start: 2022-01-01 | End: 2022-01-01 | Stop reason: HOSPADM

## 2022-01-01 RX ORDER — HEPARIN 100 UNIT/ML
500 SYRINGE INTRAVENOUS AS NEEDED
Status: CANCELLED
Start: 2022-09-15

## 2022-01-01 RX ORDER — DEXTROSE MONOHYDRATE 50 MG/ML
5-250 INJECTION, SOLUTION INTRAVENOUS AS NEEDED
Status: CANCELLED | OUTPATIENT
Start: 2022-01-01

## 2022-01-01 RX ORDER — MAG HYDROX/ALUMINUM HYD/SIMETH 200-200-20
30 SUSPENSION, ORAL (FINAL DOSE FORM) ORAL
Status: DISCONTINUED | OUTPATIENT
Start: 2022-01-01 | End: 2022-09-13 | Stop reason: HOSPADM

## 2022-01-01 RX ORDER — DIPHENHYDRAMINE HYDROCHLORIDE 50 MG/ML
25 INJECTION, SOLUTION INTRAMUSCULAR; INTRAVENOUS AS NEEDED
Status: DISCONTINUED | OUTPATIENT
Start: 2022-01-01 | End: 2022-01-01 | Stop reason: HOSPADM

## 2022-01-01 RX ORDER — HEPARIN 100 UNIT/ML
300-500 SYRINGE INTRAVENOUS AS NEEDED
Status: DISCONTINUED | OUTPATIENT
Start: 2022-01-01 | End: 2022-01-01 | Stop reason: HOSPADM

## 2022-01-01 RX ORDER — DEXTROMETHORPHAN/PSEUDOEPHED 2.5-7.5/.8
1.2 DROPS ORAL
Status: DISCONTINUED | OUTPATIENT
Start: 2022-01-01 | End: 2022-01-01 | Stop reason: HOSPADM

## 2022-01-01 RX ORDER — SODIUM CHLORIDE 0.9 % (FLUSH) 0.9 %
5-40 SYRINGE (ML) INJECTION AS NEEDED
Status: DISCONTINUED | OUTPATIENT
Start: 2022-01-01 | End: 2022-09-13 | Stop reason: HOSPADM

## 2022-01-01 RX ORDER — SODIUM CHLORIDE 9 MG/ML
25 INJECTION, SOLUTION INTRAVENOUS CONTINUOUS
Status: CANCELLED | OUTPATIENT
Start: 2022-01-01

## 2022-01-01 RX ORDER — HEPARIN 100 UNIT/ML
500 SYRINGE INTRAVENOUS AS NEEDED
Status: DISCONTINUED | OUTPATIENT
Start: 2022-01-01 | End: 2022-01-01 | Stop reason: HOSPADM

## 2022-01-01 RX ORDER — EPINEPHRINE 1 MG/ML
0.3 INJECTION, SOLUTION, CONCENTRATE INTRAVENOUS AS NEEDED
Status: CANCELLED | OUTPATIENT
Start: 2022-09-13

## 2022-01-01 RX ORDER — DEXAMETHASONE SODIUM PHOSPHATE 10 MG/ML
10 INJECTION INTRAMUSCULAR; INTRAVENOUS ONCE
Status: COMPLETED | OUTPATIENT
Start: 2022-01-01 | End: 2022-01-01

## 2022-01-01 RX ORDER — EPINEPHRINE 1 MG/ML
0.3 INJECTION, SOLUTION, CONCENTRATE INTRAVENOUS AS NEEDED
Status: ACTIVE | OUTPATIENT
Start: 2022-01-01 | End: 2022-01-01

## 2022-01-01 RX ORDER — ACETAMINOPHEN 325 MG/1
650 TABLET ORAL
Status: DISCONTINUED | OUTPATIENT
Start: 2022-01-01 | End: 2022-09-13 | Stop reason: HOSPADM

## 2022-01-01 RX ORDER — HYDROCORTISONE SODIUM SUCCINATE 100 MG/2ML
100 INJECTION, POWDER, FOR SOLUTION INTRAMUSCULAR; INTRAVENOUS AS NEEDED
Status: ACTIVE | OUTPATIENT
Start: 2022-01-01 | End: 2022-01-01

## 2022-01-01 RX ORDER — LORAZEPAM 2 MG/ML
0.5 CONCENTRATE ORAL
Status: DISCONTINUED | OUTPATIENT
Start: 2022-01-01 | End: 2022-09-13 | Stop reason: HOSPADM

## 2022-01-01 RX ORDER — SODIUM CHLORIDE 9 MG/ML
50 INJECTION, SOLUTION INTRAVENOUS CONTINUOUS
Status: DISCONTINUED | OUTPATIENT
Start: 2022-01-01 | End: 2022-01-01 | Stop reason: HOSPADM

## 2022-01-01 RX ORDER — SODIUM CHLORIDE 9 MG/ML
5-40 INJECTION INTRAMUSCULAR; INTRAVENOUS; SUBCUTANEOUS AS NEEDED
Status: CANCELLED | OUTPATIENT
Start: 2022-09-13

## 2022-01-01 RX ORDER — ALBUTEROL SULFATE 0.83 MG/ML
2.5 SOLUTION RESPIRATORY (INHALATION) AS NEEDED
Status: ACTIVE | OUTPATIENT
Start: 2022-01-01 | End: 2022-01-01

## 2022-01-01 RX ORDER — SODIUM CHLORIDE 9 MG/ML
500 INJECTION, SOLUTION INTRAVENOUS ONCE
Status: DISCONTINUED | OUTPATIENT
Start: 2022-01-01 | End: 2022-01-01 | Stop reason: SDUPTHER

## 2022-01-01 RX ORDER — LIDOCAINE HYDROCHLORIDE 20 MG/ML
INJECTION, SOLUTION EPIDURAL; INFILTRATION; INTRACAUDAL; PERINEURAL AS NEEDED
Status: DISCONTINUED | OUTPATIENT
Start: 2022-01-01 | End: 2022-01-01 | Stop reason: HOSPADM

## 2022-01-01 RX ORDER — HEPARIN 100 UNIT/ML
300-500 SYRINGE INTRAVENOUS AS NEEDED
Status: ACTIVE | OUTPATIENT
Start: 2022-01-01 | End: 2022-01-01

## 2022-01-01 RX ORDER — SODIUM CHLORIDE 9 MG/ML
5-250 INJECTION, SOLUTION INTRAVENOUS AS NEEDED
Status: DISCONTINUED | OUTPATIENT
Start: 2022-01-01 | End: 2022-01-01 | Stop reason: HOSPADM

## 2022-01-01 RX ORDER — SODIUM CHLORIDE 9 MG/ML
1000 INJECTION, SOLUTION INTRAVENOUS ONCE
Status: COMPLETED | OUTPATIENT
Start: 2022-01-01 | End: 2022-01-01

## 2022-01-01 RX ORDER — ADENOSINE 3 MG/ML
6 INJECTION, SOLUTION INTRAVENOUS ONCE
Status: COMPLETED | OUTPATIENT
Start: 2022-01-01 | End: 2022-01-01

## 2022-01-01 RX ORDER — DEXTROSE MONOHYDRATE 50 MG/ML
5-250 INJECTION, SOLUTION INTRAVENOUS AS NEEDED
Status: DISPENSED | OUTPATIENT
Start: 2022-01-01 | End: 2022-01-01

## 2022-01-01 RX ORDER — SODIUM CHLORIDE 0.9 % (FLUSH) 0.9 %
10 SYRINGE (ML) INJECTION AS NEEDED
Status: CANCELLED | OUTPATIENT
Start: 2022-01-01

## 2022-01-01 RX ORDER — CHLORHEXIDINE GLUCONATE 1.2 MG/ML
15 RINSE ORAL EVERY 12 HOURS
Status: DISCONTINUED | OUTPATIENT
Start: 2022-01-01 | End: 2022-09-13 | Stop reason: HOSPADM

## 2022-01-01 RX ORDER — ALBUTEROL SULFATE 0.83 MG/ML
2.5 SOLUTION RESPIRATORY (INHALATION) AS NEEDED
Status: DISCONTINUED | OUTPATIENT
Start: 2022-01-01 | End: 2022-01-01 | Stop reason: HOSPADM

## 2022-01-01 RX ORDER — DIPHENHYDRAMINE HYDROCHLORIDE 50 MG/ML
50 INJECTION, SOLUTION INTRAMUSCULAR; INTRAVENOUS AS NEEDED
Status: DISCONTINUED | OUTPATIENT
Start: 2022-01-01 | End: 2022-01-01 | Stop reason: HOSPADM

## 2022-01-01 RX ORDER — ALBUMIN HUMAN 50 G/1000ML
25 SOLUTION INTRAVENOUS ONCE
Status: DISCONTINUED | OUTPATIENT
Start: 2022-09-13 | End: 2022-09-13 | Stop reason: HOSPADM

## 2022-01-01 RX ORDER — FLUMAZENIL 0.1 MG/ML
0.2 INJECTION INTRAVENOUS
Status: DISCONTINUED | OUTPATIENT
Start: 2022-01-01 | End: 2022-01-01 | Stop reason: HOSPADM

## 2022-01-01 RX ORDER — HYDROCORTISONE SODIUM SUCCINATE 100 MG/2ML
100 INJECTION, POWDER, FOR SOLUTION INTRAMUSCULAR; INTRAVENOUS AS NEEDED
Status: DISCONTINUED | OUTPATIENT
Start: 2022-01-01 | End: 2022-01-01 | Stop reason: HOSPADM

## 2022-01-01 RX ORDER — PALONOSETRON 0.05 MG/ML
0.25 INJECTION, SOLUTION INTRAVENOUS ONCE
Status: CANCELLED | OUTPATIENT
Start: 2022-09-13 | End: 2022-09-13

## 2022-01-01 RX ORDER — LIDOCAINE HYDROCHLORIDE AND EPINEPHRINE 10; 10 MG/ML; UG/ML
30 INJECTION, SOLUTION INFILTRATION; PERINEURAL
Status: COMPLETED | OUTPATIENT
Start: 2022-01-01 | End: 2022-01-01

## 2022-01-01 RX ORDER — SODIUM CHLORIDE 9 MG/ML
25 INJECTION, SOLUTION INTRAVENOUS CONTINUOUS
Status: DISCONTINUED | OUTPATIENT
Start: 2022-01-01 | End: 2022-01-01 | Stop reason: HOSPADM

## 2022-01-01 RX ORDER — DIPHENHYDRAMINE HYDROCHLORIDE 50 MG/ML
25 INJECTION, SOLUTION INTRAMUSCULAR; INTRAVENOUS
Status: DISCONTINUED | OUTPATIENT
Start: 2022-01-01 | End: 2022-01-01 | Stop reason: HOSPADM

## 2022-01-01 RX ORDER — OXYCODONE HYDROCHLORIDE 5 MG/1
5 TABLET ORAL
Qty: 90 TABLET | Refills: 0 | Status: SHIPPED | OUTPATIENT
Start: 2022-01-01 | End: 2022-01-01

## 2022-01-01 RX ORDER — ONDANSETRON 2 MG/ML
8 INJECTION INTRAMUSCULAR; INTRAVENOUS ONCE
Status: CANCELLED | OUTPATIENT
Start: 2022-01-01 | End: 2022-01-01

## 2022-01-01 RX ORDER — ACETAMINOPHEN 325 MG/1
650 TABLET ORAL AS NEEDED
Status: DISCONTINUED | OUTPATIENT
Start: 2022-01-01 | End: 2022-01-01 | Stop reason: HOSPADM

## 2022-01-01 RX ORDER — HEPARIN 100 UNIT/ML
500 SYRINGE INTRAVENOUS AS NEEDED
Status: ACTIVE | OUTPATIENT
Start: 2022-01-01 | End: 2022-01-01

## 2022-01-01 RX ORDER — SODIUM CHLORIDE 9 MG/ML
5-40 INJECTION INTRAMUSCULAR; INTRAVENOUS; SUBCUTANEOUS AS NEEDED
Status: CANCELLED | OUTPATIENT
Start: 2022-01-01

## 2022-01-01 RX ORDER — SODIUM CHLORIDE 9 MG/ML
INJECTION, SOLUTION INTRAVENOUS
Status: DISCONTINUED | OUTPATIENT
Start: 2022-01-01 | End: 2022-01-01 | Stop reason: HOSPADM

## 2022-01-01 RX ORDER — HEPARIN 100 UNIT/ML
500 SYRINGE INTRAVENOUS AS NEEDED
Status: DISPENSED | OUTPATIENT
Start: 2022-01-01 | End: 2022-01-01

## 2022-01-01 RX ORDER — SODIUM CHLORIDE 9 MG/ML
10 INJECTION INTRAMUSCULAR; INTRAVENOUS; SUBCUTANEOUS AS NEEDED
Status: DISCONTINUED | OUTPATIENT
Start: 2022-01-01 | End: 2022-01-01 | Stop reason: HOSPADM

## 2022-01-01 RX ORDER — SODIUM CHLORIDE 0.9 % (FLUSH) 0.9 %
5-40 SYRINGE (ML) INJECTION EVERY 8 HOURS
Status: DISCONTINUED | OUTPATIENT
Start: 2022-01-01 | End: 2022-09-13 | Stop reason: HOSPADM

## 2022-01-01 RX ORDER — SODIUM CHLORIDE, SODIUM LACTATE, POTASSIUM CHLORIDE, CALCIUM CHLORIDE 600; 310; 30; 20 MG/100ML; MG/100ML; MG/100ML; MG/100ML
125 INJECTION, SOLUTION INTRAVENOUS CONTINUOUS
Status: DISCONTINUED | OUTPATIENT
Start: 2022-01-01 | End: 2022-01-01

## 2022-01-01 RX ORDER — FENTANYL CITRATE 50 UG/ML
200 INJECTION, SOLUTION INTRAMUSCULAR; INTRAVENOUS
Status: DISCONTINUED | OUTPATIENT
Start: 2022-01-01 | End: 2022-01-01 | Stop reason: HOSPADM

## 2022-01-01 RX ORDER — DIPHENHYDRAMINE HYDROCHLORIDE 50 MG/ML
50 INJECTION, SOLUTION INTRAMUSCULAR; INTRAVENOUS AS NEEDED
Status: ACTIVE | OUTPATIENT
Start: 2022-01-01 | End: 2022-01-01

## 2022-01-01 RX ORDER — MIDAZOLAM HYDROCHLORIDE 1 MG/ML
5 INJECTION, SOLUTION INTRAMUSCULAR; INTRAVENOUS
Status: DISCONTINUED | OUTPATIENT
Start: 2022-01-01 | End: 2022-01-01 | Stop reason: HOSPADM

## 2022-01-01 RX ORDER — BACITRACIN 500 UNIT/G
1 PACKET (EA) TOPICAL AS NEEDED
Status: DISCONTINUED | OUTPATIENT
Start: 2022-01-01 | End: 2022-09-13 | Stop reason: HOSPADM

## 2022-01-01 RX ORDER — EPINEPHRINE 0.1 MG/ML
1 INJECTION INTRACARDIAC; INTRAVENOUS
Status: DISCONTINUED | OUTPATIENT
Start: 2022-01-01 | End: 2022-01-01 | Stop reason: HOSPADM

## 2022-01-01 RX ORDER — SODIUM CHLORIDE 0.9 % (FLUSH) 0.9 %
5-40 SYRINGE (ML) INJECTION AS NEEDED
Status: CANCELLED | OUTPATIENT
Start: 2022-09-15

## 2022-01-01 RX ORDER — LORAZEPAM 2 MG/ML
0.5 INJECTION INTRAMUSCULAR
Status: DISCONTINUED | OUTPATIENT
Start: 2022-01-01 | End: 2022-01-01 | Stop reason: HOSPADM

## 2022-01-01 RX ORDER — SODIUM CHLORIDE 0.9 % (FLUSH) 0.9 %
5-40 SYRINGE (ML) INJECTION AS NEEDED
Status: CANCELLED | OUTPATIENT
Start: 2022-09-13

## 2022-01-01 RX ORDER — OXYCODONE HYDROCHLORIDE 5 MG/1
5 TABLET ORAL
Qty: 90 TABLET | Refills: 0 | Status: SHIPPED | OUTPATIENT
Start: 2022-01-01 | End: 2022-01-01 | Stop reason: SDUPTHER

## 2022-01-01 RX ORDER — DEXTROSE MONOHYDRATE 50 MG/ML
5-250 INJECTION, SOLUTION INTRAVENOUS AS NEEDED
Status: CANCELLED | OUTPATIENT
Start: 2022-09-13

## 2022-01-01 RX ORDER — ONDANSETRON 2 MG/ML
8 INJECTION INTRAMUSCULAR; INTRAVENOUS AS NEEDED
Status: DISCONTINUED | OUTPATIENT
Start: 2022-01-01 | End: 2022-01-01 | Stop reason: HOSPADM

## 2022-01-01 RX ORDER — SODIUM BICARBONATE 1 MEQ/ML
100 SYRINGE (ML) INTRAVENOUS ONCE
Status: DISCONTINUED | OUTPATIENT
Start: 2022-09-13 | End: 2022-09-13 | Stop reason: HOSPADM

## 2022-01-01 RX ORDER — DIPHENHYDRAMINE HYDROCHLORIDE 50 MG/ML
50 INJECTION, SOLUTION INTRAMUSCULAR; INTRAVENOUS AS NEEDED
Status: CANCELLED
Start: 2022-09-13

## 2022-01-01 RX ORDER — ACETAMINOPHEN 325 MG/1
650 TABLET ORAL AS NEEDED
Status: CANCELLED
Start: 2022-09-13

## 2022-01-01 RX ORDER — HYDROCORTISONE SODIUM SUCCINATE 100 MG/2ML
100 INJECTION, POWDER, FOR SOLUTION INTRAMUSCULAR; INTRAVENOUS AS NEEDED
Status: CANCELLED | OUTPATIENT
Start: 2022-09-13

## 2022-01-01 RX ORDER — FLUDEOXYGLUCOSE F-18 200 MCI/ML
10 INJECTION INTRAVENOUS ONCE
Status: COMPLETED | OUTPATIENT
Start: 2022-01-01 | End: 2022-01-01

## 2022-01-01 RX ORDER — ACETAMINOPHEN 650 MG/1
650 SUPPOSITORY RECTAL
Status: DISCONTINUED | OUTPATIENT
Start: 2022-01-01 | End: 2022-09-13 | Stop reason: HOSPADM

## 2022-01-01 RX ORDER — FLUDEOXYGLUCOSE F-18 200 MCI/ML
10 INJECTION INTRAVENOUS ONCE
Status: SHIPPED | OUTPATIENT
Start: 2022-01-01 | End: 2022-01-01

## 2022-01-01 RX ORDER — GABAPENTIN 100 MG/1
100 CAPSULE ORAL 3 TIMES DAILY
Status: DISCONTINUED | OUTPATIENT
Start: 2022-01-01 | End: 2022-09-13 | Stop reason: HOSPADM

## 2022-01-01 RX ORDER — HEPARIN 100 UNIT/ML
500 SYRINGE INTRAVENOUS
Status: COMPLETED | OUTPATIENT
Start: 2022-01-01 | End: 2022-01-01

## 2022-01-01 RX ORDER — PROCHLORPERAZINE MALEATE 5 MG
5 TABLET ORAL
Qty: 30 TABLET | Refills: 3 | Status: SHIPPED | OUTPATIENT
Start: 2022-01-01

## 2022-01-01 RX ORDER — FENTANYL CITRATE 50 UG/ML
25 INJECTION, SOLUTION INTRAMUSCULAR; INTRAVENOUS
Status: DISCONTINUED | OUTPATIENT
Start: 2022-01-01 | End: 2022-09-13 | Stop reason: HOSPADM

## 2022-01-01 RX ORDER — PALONOSETRON 0.05 MG/ML
0.25 INJECTION, SOLUTION INTRAVENOUS ONCE
Status: CANCELLED | OUTPATIENT
Start: 2022-01-01 | End: 2022-01-01

## 2022-01-01 RX ORDER — ADENOSINE 3 MG/ML
12 INJECTION, SOLUTION INTRAVENOUS ONCE
Status: COMPLETED | OUTPATIENT
Start: 2022-01-01 | End: 2022-01-01

## 2022-01-01 RX ORDER — SODIUM CHLORIDE 9 MG/ML
5-40 INJECTION INTRAMUSCULAR; INTRAVENOUS; SUBCUTANEOUS AS NEEDED
Status: CANCELLED | OUTPATIENT
Start: 2022-09-15

## 2022-01-01 RX ORDER — SODIUM CHLORIDE 9 MG/ML
100 INJECTION, SOLUTION INTRAVENOUS CONTINUOUS
Status: DISCONTINUED | OUTPATIENT
Start: 2022-01-01 | End: 2022-01-01

## 2022-01-01 RX ORDER — MAGNESIUM SULFATE HEPTAHYDRATE 40 MG/ML
2 INJECTION, SOLUTION INTRAVENOUS ONCE
Status: COMPLETED | OUTPATIENT
Start: 2022-01-01 | End: 2022-01-01

## 2022-01-01 RX ORDER — ONDANSETRON 8 MG/1
8 TABLET, ORALLY DISINTEGRATING ORAL
Qty: 60 TABLET | Refills: 1 | Status: SHIPPED | OUTPATIENT
Start: 2022-01-01

## 2022-01-01 RX ORDER — SODIUM CHLORIDE 9 MG/ML
5-250 INJECTION, SOLUTION INTRAVENOUS AS NEEDED
Status: CANCELLED | OUTPATIENT
Start: 2022-09-15

## 2022-01-01 RX ORDER — ONDANSETRON 2 MG/ML
4 INJECTION INTRAMUSCULAR; INTRAVENOUS
Status: DISCONTINUED | OUTPATIENT
Start: 2022-01-01 | End: 2022-09-13 | Stop reason: HOSPADM

## 2022-01-01 RX ORDER — DIPHENHYDRAMINE HYDROCHLORIDE 50 MG/ML
25 INJECTION, SOLUTION INTRAMUSCULAR; INTRAVENOUS AS NEEDED
Status: CANCELLED
Start: 2022-09-13

## 2022-01-01 RX ORDER — SODIUM CHLORIDE 9 MG/ML
1000 INJECTION, SOLUTION INTRAVENOUS ONCE
Status: CANCELLED
Start: 2022-01-01 | End: 2022-01-01

## 2022-01-01 RX ORDER — PROPOFOL 10 MG/ML
INJECTION, EMULSION INTRAVENOUS AS NEEDED
Status: DISCONTINUED | OUTPATIENT
Start: 2022-01-01 | End: 2022-01-01 | Stop reason: HOSPADM

## 2022-01-01 RX ORDER — SODIUM CHLORIDE 0.9 % (FLUSH) 0.9 %
10 SYRINGE (ML) INJECTION
Status: COMPLETED | OUTPATIENT
Start: 2022-01-01 | End: 2022-01-01

## 2022-01-01 RX ORDER — ALBUTEROL SULFATE 0.83 MG/ML
2.5 SOLUTION RESPIRATORY (INHALATION) AS NEEDED
Status: CANCELLED
Start: 2022-09-13

## 2022-01-01 RX ORDER — LIDOCAINE AND PRILOCAINE 25; 25 MG/G; MG/G
CREAM TOPICAL AS NEEDED
Qty: 30 G | Refills: 1 | Status: SHIPPED | OUTPATIENT
Start: 2022-01-01

## 2022-01-01 RX ORDER — TRAMADOL HYDROCHLORIDE 50 MG/1
50 TABLET ORAL
Qty: 60 TABLET | Refills: 0 | Status: SHIPPED | OUTPATIENT
Start: 2022-01-01 | End: 2022-01-01 | Stop reason: ALTCHOICE

## 2022-01-01 RX ORDER — CIPROFLOXACIN 250 MG/1
250 TABLET, FILM COATED ORAL 2 TIMES DAILY
Qty: 10 TABLET | Refills: 0 | Status: SHIPPED | OUTPATIENT
Start: 2022-01-01 | End: 2022-01-01

## 2022-01-01 RX ORDER — POLYETHYLENE GLYCOL 3350 17 G/17G
17 POWDER, FOR SOLUTION ORAL DAILY PRN
Status: DISCONTINUED | OUTPATIENT
Start: 2022-01-01 | End: 2022-09-13 | Stop reason: HOSPADM

## 2022-01-01 RX ORDER — NALOXONE HYDROCHLORIDE 0.4 MG/ML
0.4 INJECTION, SOLUTION INTRAMUSCULAR; INTRAVENOUS; SUBCUTANEOUS
Status: DISCONTINUED | OUTPATIENT
Start: 2022-01-01 | End: 2022-01-01 | Stop reason: HOSPADM

## 2022-01-01 RX ORDER — HEPARIN 100 UNIT/ML
500 SYRINGE INTRAVENOUS AS NEEDED
Status: CANCELLED
Start: 2022-09-13

## 2022-01-01 RX ORDER — SODIUM BICARBONATE IN D5W 150/1000ML
PLASTIC BAG, INJECTION (ML) INTRAVENOUS CONTINUOUS
Status: DISCONTINUED | OUTPATIENT
Start: 2022-09-13 | End: 2022-09-13 | Stop reason: HOSPADM

## 2022-01-01 RX ORDER — SODIUM CHLORIDE 9 MG/ML
10 INJECTION INTRAMUSCULAR; INTRAVENOUS; SUBCUTANEOUS AS NEEDED
Status: DISPENSED | OUTPATIENT
Start: 2022-01-01 | End: 2022-01-01

## 2022-01-01 RX ADMIN — PIPERACILLIN AND TAZOBACTAM 3.38 G: 3; .375 INJECTION, POWDER, FOR SOLUTION INTRAVENOUS at 15:03

## 2022-01-01 RX ADMIN — PROPOFOL 75 MG: 10 INJECTION, EMULSION INTRAVENOUS at 07:43

## 2022-01-01 RX ADMIN — ONDANSETRON 8 MG: 2 INJECTION, SOLUTION INTRAMUSCULAR; INTRAVENOUS at 10:34

## 2022-01-01 RX ADMIN — GABAPENTIN 100 MG: 100 CAPSULE ORAL at 16:54

## 2022-01-01 RX ADMIN — ONDANSETRON 8 MG: 2 INJECTION INTRAMUSCULAR; INTRAVENOUS at 14:37

## 2022-01-01 RX ADMIN — PROPOFOL 25 MG: 10 INJECTION, EMULSION INTRAVENOUS at 07:49

## 2022-01-01 RX ADMIN — IOPAMIDOL 80 ML: 755 INJECTION, SOLUTION INTRAVENOUS at 16:47

## 2022-01-01 RX ADMIN — DEXAMETHASONE SODIUM PHOSPHATE 12 MG: 4 INJECTION, SOLUTION INTRAMUSCULAR; INTRAVENOUS at 10:25

## 2022-01-01 RX ADMIN — PIPERACILLIN AND TAZOBACTAM 3.38 G: 3; .375 INJECTION, POWDER, FOR SOLUTION INTRAVENOUS at 05:02

## 2022-01-01 RX ADMIN — PIPERACILLIN AND TAZOBACTAM 3.38 G: 3; .375 INJECTION, POWDER, FOR SOLUTION INTRAVENOUS at 14:35

## 2022-01-01 RX ADMIN — SODIUM CHLORIDE, PRESERVATIVE FREE 10 ML: 5 INJECTION INTRAVENOUS at 09:23

## 2022-01-01 RX ADMIN — PROPOFOL 25 MG: 10 INJECTION, EMULSION INTRAVENOUS at 08:03

## 2022-01-01 RX ADMIN — SODIUM CHLORIDE, POTASSIUM CHLORIDE, SODIUM LACTATE AND CALCIUM CHLORIDE 125 ML/HR: 600; 310; 30; 20 INJECTION, SOLUTION INTRAVENOUS at 10:03

## 2022-01-01 RX ADMIN — DEXAMETHASONE SODIUM PHOSPHATE 10 MG: 10 INJECTION, SOLUTION INTRAMUSCULAR; INTRAVENOUS at 11:17

## 2022-01-01 RX ADMIN — GEMCITABINE HYDROCHLORIDE 1536 MG: 1 INJECTION, SOLUTION INTRAVENOUS at 13:13

## 2022-01-01 RX ADMIN — PACLITAXEL 192 MG: 100 INJECTION, POWDER, LYOPHILIZED, FOR SUSPENSION INTRAVENOUS at 15:22

## 2022-01-01 RX ADMIN — SODIUM BICARBONATE 50 MEQ: 84 INJECTION, SOLUTION INTRAVENOUS at 23:26

## 2022-01-01 RX ADMIN — SODIUM CHLORIDE, PRESERVATIVE FREE 10 ML: 5 INJECTION INTRAVENOUS at 10:58

## 2022-01-01 RX ADMIN — SODIUM CHLORIDE: 900 INJECTION, SOLUTION INTRAVENOUS at 07:35

## 2022-01-01 RX ADMIN — SODIUM CHLORIDE, PRESERVATIVE FREE 10 ML: 5 INJECTION INTRAVENOUS at 12:27

## 2022-01-01 RX ADMIN — PROPOFOL 25 MG: 10 INJECTION, EMULSION INTRAVENOUS at 08:00

## 2022-01-01 RX ADMIN — SODIUM CHLORIDE, PRESERVATIVE FREE 10 ML: 5 INJECTION INTRAVENOUS at 13:06

## 2022-01-01 RX ADMIN — POTASSIUM CHLORIDE 10 MEQ: 7.46 INJECTION, SOLUTION INTRAVENOUS at 10:25

## 2022-01-01 RX ADMIN — IOPAMIDOL 100 ML: 755 INJECTION, SOLUTION INTRAVENOUS at 09:41

## 2022-01-01 RX ADMIN — GABAPENTIN 100 MG: 100 CAPSULE ORAL at 22:14

## 2022-01-01 RX ADMIN — PROPOFOL 25 MG: 10 INJECTION, EMULSION INTRAVENOUS at 07:53

## 2022-01-01 RX ADMIN — FLUOROURACIL 4368 MG: 50 INJECTION, SOLUTION INTRAVENOUS at 14:16

## 2022-01-01 RX ADMIN — PIPERACILLIN AND TAZOBACTAM 4.5 G: 4; .5 INJECTION, POWDER, FOR SOLUTION INTRAVENOUS at 22:57

## 2022-01-01 RX ADMIN — GABAPENTIN 100 MG: 100 CAPSULE ORAL at 22:42

## 2022-01-01 RX ADMIN — GABAPENTIN 100 MG: 100 CAPSULE ORAL at 15:29

## 2022-01-01 RX ADMIN — PALONOSETRON 0.25 MG: 0.05 INJECTION, SOLUTION INTRAVENOUS at 10:23

## 2022-01-01 RX ADMIN — PIPERACILLIN AND TAZOBACTAM 3.38 G: 3; .375 INJECTION, POWDER, FOR SOLUTION INTRAVENOUS at 21:39

## 2022-01-01 RX ADMIN — SODIUM CHLORIDE 1000 ML: 900 INJECTION, SOLUTION INTRAVENOUS at 18:32

## 2022-01-01 RX ADMIN — LIDOCAINE HYDROCHLORIDE 100 MG: 20 INJECTION, SOLUTION EPIDURAL; INFILTRATION; INTRACAUDAL; PERINEURAL at 07:43

## 2022-01-01 RX ADMIN — IOPAMIDOL 100 ML: 755 INJECTION, SOLUTION INTRAVENOUS at 18:13

## 2022-01-01 RX ADMIN — GABAPENTIN 100 MG: 100 CAPSULE ORAL at 15:03

## 2022-01-01 RX ADMIN — SODIUM CHLORIDE, PRESERVATIVE FREE 10 ML: 5 INJECTION INTRAVENOUS at 09:00

## 2022-01-01 RX ADMIN — PIPERACILLIN AND TAZOBACTAM 3.38 G: 3; .375 INJECTION, POWDER, FOR SOLUTION INTRAVENOUS at 14:38

## 2022-01-01 RX ADMIN — ONDANSETRON 4 MG: 2 INJECTION INTRAMUSCULAR; INTRAVENOUS at 18:29

## 2022-01-01 RX ADMIN — ALUMINUM HYDROXIDE, MAGNESIUM HYDROXIDE, AND SIMETHICONE 30 ML: 200; 200; 20 SUSPENSION ORAL at 18:34

## 2022-01-01 RX ADMIN — GABAPENTIN 100 MG: 100 CAPSULE ORAL at 09:39

## 2022-01-01 RX ADMIN — SODIUM CHLORIDE 1536 MG: 9 INJECTION, SOLUTION INTRAVENOUS at 11:49

## 2022-01-01 RX ADMIN — POTASSIUM PHOSPHATE, MONOBASIC AND POTASSIUM PHOSPHATE, DIBASIC: 224; 236 INJECTION, SOLUTION, CONCENTRATE INTRAVENOUS at 09:10

## 2022-01-01 RX ADMIN — PIPERACILLIN AND TAZOBACTAM 3.38 G: 3; .375 INJECTION, POWDER, FOR SOLUTION INTRAVENOUS at 14:29

## 2022-01-01 RX ADMIN — MAGNESIUM SULFATE HEPTAHYDRATE 2 G: 40 INJECTION, SOLUTION INTRAVENOUS at 09:49

## 2022-01-01 RX ADMIN — SODIUM CHLORIDE 1000 ML: 9 INJECTION, SOLUTION INTRAVENOUS at 17:45

## 2022-01-01 RX ADMIN — SODIUM CHLORIDE, POTASSIUM CHLORIDE, SODIUM LACTATE AND CALCIUM CHLORIDE 125 ML/HR: 600; 310; 30; 20 INJECTION, SOLUTION INTRAVENOUS at 22:42

## 2022-01-01 RX ADMIN — SODIUM CHLORIDE, PRESERVATIVE FREE 10 ML: 5 INJECTION INTRAVENOUS at 13:15

## 2022-01-01 RX ADMIN — SMOFLIPID 250 ML: 6; 6; 5; 3 INJECTION, EMULSION INTRAVENOUS at 18:48

## 2022-01-01 RX ADMIN — CALCIUM GLUCONATE: 94 INJECTION, SOLUTION INTRAVENOUS at 18:04

## 2022-01-01 RX ADMIN — GABAPENTIN 100 MG: 100 CAPSULE ORAL at 16:05

## 2022-01-01 RX ADMIN — FENTANYL CITRATE 25 MCG: 50 INJECTION, SOLUTION INTRAMUSCULAR; INTRAVENOUS at 10:02

## 2022-01-01 RX ADMIN — PROPOFOL 25 MG: 10 INJECTION, EMULSION INTRAVENOUS at 07:51

## 2022-01-01 RX ADMIN — SODIUM CHLORIDE 1000 ML/HR: 900 INJECTION, SOLUTION INTRAVENOUS at 14:00

## 2022-01-01 RX ADMIN — HEPARIN 500 UNITS: 100 SYRINGE at 16:30

## 2022-01-01 RX ADMIN — SODIUM CHLORIDE 100 ML/HR: 9 INJECTION, SOLUTION INTRAVENOUS at 23:59

## 2022-01-01 RX ADMIN — GEMCITABINE HYDROCHLORIDE 1536 MG: 1 INJECTION, SOLUTION INTRAVENOUS at 12:52

## 2022-01-01 RX ADMIN — SODIUM CHLORIDE, PRESERVATIVE FREE 10 ML: 5 INJECTION INTRAVENOUS at 14:44

## 2022-01-01 RX ADMIN — GABAPENTIN 100 MG: 100 CAPSULE ORAL at 21:39

## 2022-01-01 RX ADMIN — LIDOCAINE HYDROCHLORIDE,EPINEPHRINE BITARTRATE 10 ML: 10; .01 INJECTION, SOLUTION INFILTRATION; PERINEURAL at 10:09

## 2022-01-01 RX ADMIN — PACLITAXEL 192 MG: 100 INJECTION, POWDER, LYOPHILIZED, FOR SUSPENSION INTRAVENOUS at 14:58

## 2022-01-01 RX ADMIN — DEXTROSE MONOHYDRATE 150 MG: 50 INJECTION, SOLUTION INTRAVENOUS at 21:15

## 2022-01-01 RX ADMIN — SODIUM CHLORIDE, POTASSIUM CHLORIDE, SODIUM LACTATE AND CALCIUM CHLORIDE 125 ML/HR: 600; 310; 30; 20 INJECTION, SOLUTION INTRAVENOUS at 05:43

## 2022-01-01 RX ADMIN — EPINEPHRINE 1 MG: 0.1 INJECTION INTRACARDIAC; INTRAVENOUS at 22:45

## 2022-01-01 RX ADMIN — SODIUM CHLORIDE, PRESERVATIVE FREE 10 ML: 5 INJECTION INTRAVENOUS at 05:02

## 2022-01-01 RX ADMIN — DEXAMETHASONE SODIUM PHOSPHATE 10 MG: 10 INJECTION, SOLUTION INTRAMUSCULAR; INTRAVENOUS at 14:39

## 2022-01-01 RX ADMIN — PROPOFOL 25 MG: 10 INJECTION, EMULSION INTRAVENOUS at 08:01

## 2022-01-01 RX ADMIN — GABAPENTIN 100 MG: 100 CAPSULE ORAL at 14:38

## 2022-01-01 RX ADMIN — ADENOSINE 6 MG: 3 INJECTION INTRAVENOUS at 20:30

## 2022-01-01 RX ADMIN — EPINEPHRINE 1 MG: 0.1 INJECTION INTRACARDIAC; INTRAVENOUS at 23:33

## 2022-01-01 RX ADMIN — DEXTROSE MONOHYDRATE 50 ML: 25 INJECTION, SOLUTION INTRAVENOUS at 23:32

## 2022-01-01 RX ADMIN — MIDAZOLAM HYDROCHLORIDE 0.5 MG: 1 INJECTION, SOLUTION INTRAMUSCULAR; INTRAVENOUS at 10:02

## 2022-01-01 RX ADMIN — DEXTROSE MONOHYDRATE 25 ML/HR: 5 INJECTION, SOLUTION INTRAVENOUS at 10:58

## 2022-01-01 RX ADMIN — SODIUM CHLORIDE, PRESERVATIVE FREE 10 ML: 5 INJECTION INTRAVENOUS at 15:10

## 2022-01-01 RX ADMIN — SODIUM BICARBONATE 50 MEQ: 84 INJECTION, SOLUTION INTRAVENOUS at 23:25

## 2022-01-01 RX ADMIN — EPINEPHRINE 1 MG: 0.1 INJECTION INTRACARDIAC; INTRAVENOUS at 23:30

## 2022-01-01 RX ADMIN — SODIUM CHLORIDE 10 ML: 9 INJECTION INTRAMUSCULAR; INTRAVENOUS; SUBCUTANEOUS at 14:00

## 2022-01-01 RX ADMIN — CALCIUM GLUCONATE: 94 INJECTION, SOLUTION INTRAVENOUS at 18:44

## 2022-01-01 RX ADMIN — SODIUM CHLORIDE, PRESERVATIVE FREE 10 ML: 5 INJECTION INTRAVENOUS at 14:26

## 2022-01-01 RX ADMIN — DEXAMETHASONE SODIUM PHOSPHATE 10 MG: 10 INJECTION, SOLUTION INTRAMUSCULAR; INTRAVENOUS at 10:30

## 2022-01-01 RX ADMIN — SODIUM CHLORIDE, PRESERVATIVE FREE 10 ML: 5 INJECTION INTRAVENOUS at 22:58

## 2022-01-01 RX ADMIN — SODIUM CHLORIDE 500 ML: 9 INJECTION, SOLUTION INTRAVENOUS at 13:30

## 2022-01-01 RX ADMIN — ONDANSETRON 4 MG: 2 INJECTION INTRAMUSCULAR; INTRAVENOUS at 23:04

## 2022-01-01 RX ADMIN — PIPERACILLIN AND TAZOBACTAM 3.38 G: 3; .375 INJECTION, POWDER, FOR SOLUTION INTRAVENOUS at 22:49

## 2022-01-01 RX ADMIN — PROPOFOL 25 MG: 10 INJECTION, EMULSION INTRAVENOUS at 07:46

## 2022-01-01 RX ADMIN — Medication 500 UNITS: at 10:08

## 2022-01-01 RX ADMIN — ADENOSINE 12 MG: 3 INJECTION INTRAVENOUS at 20:34

## 2022-01-01 RX ADMIN — PIPERACILLIN AND TAZOBACTAM 3.38 G: 3; .375 INJECTION, POWDER, FOR SOLUTION INTRAVENOUS at 05:37

## 2022-01-01 RX ADMIN — SODIUM CHLORIDE, PRESERVATIVE FREE 10 ML: 5 INJECTION INTRAVENOUS at 14:38

## 2022-01-01 RX ADMIN — GABAPENTIN 100 MG: 100 CAPSULE ORAL at 22:07

## 2022-01-01 RX ADMIN — OXALIPLATIN 154.5 MG: 5 INJECTION, SOLUTION INTRAVENOUS at 11:44

## 2022-01-01 RX ADMIN — GEMCITABINE 1536 MG: 38 INJECTION, SOLUTION INTRAVENOUS at 15:56

## 2022-01-01 RX ADMIN — SODIUM CHLORIDE 25 ML/HR: 9 INJECTION, SOLUTION INTRAVENOUS at 14:37

## 2022-01-01 RX ADMIN — SODIUM CHLORIDE, PRESERVATIVE FREE 10 ML: 5 INJECTION INTRAVENOUS at 05:04

## 2022-01-01 RX ADMIN — ONDANSETRON 8 MG: 2 INJECTION, SOLUTION INTRAMUSCULAR; INTRAVENOUS at 14:36

## 2022-01-01 RX ADMIN — ONDANSETRON 8 MG: 2 INJECTION INTRAMUSCULAR; INTRAVENOUS at 11:48

## 2022-01-01 RX ADMIN — PACLITAXEL 192 MG: 100 INJECTION, POWDER, LYOPHILIZED, FOR SUSPENSION INTRAVENOUS at 11:38

## 2022-01-01 RX ADMIN — ONDANSETRON 8 MG: 2 INJECTION, SOLUTION INTRAMUSCULAR; INTRAVENOUS at 11:15

## 2022-01-01 RX ADMIN — HEPARIN 500 UNITS: 100 SYRINGE at 12:27

## 2022-01-01 RX ADMIN — EPINEPHRINE 1 MG: 0.1 INJECTION INTRACARDIAC; INTRAVENOUS at 23:38

## 2022-01-01 RX ADMIN — POTASSIUM CHLORIDE 10 MEQ: 7.46 INJECTION, SOLUTION INTRAVENOUS at 11:12

## 2022-01-01 RX ADMIN — DEXAMETHASONE SODIUM PHOSPHATE 10 MG: 10 INJECTION, SOLUTION INTRAMUSCULAR; INTRAVENOUS at 11:50

## 2022-01-01 RX ADMIN — PALONOSETRON 0.25 MG: 0.05 INJECTION, SOLUTION INTRAVENOUS at 10:58

## 2022-01-01 RX ADMIN — PIPERACILLIN AND TAZOBACTAM 3.38 G: 3; .375 INJECTION, POWDER, FOR SOLUTION INTRAVENOUS at 22:42

## 2022-01-01 RX ADMIN — SODIUM CHLORIDE 500 ML: 9 INJECTION, SOLUTION INTRAVENOUS at 20:20

## 2022-01-01 RX ADMIN — FLUOROURACIL 4368 MG: 50 INJECTION, SOLUTION INTRAVENOUS at 13:22

## 2022-01-01 RX ADMIN — PROPOFOL 25 MG: 10 INJECTION, EMULSION INTRAVENOUS at 07:56

## 2022-01-01 RX ADMIN — FLUDEOXYGLUCOSE F-18 10 MILLICURIE: 200 INJECTION INTRAVENOUS at 10:50

## 2022-01-01 RX ADMIN — PIPERACILLIN AND TAZOBACTAM 3.38 G: 3; .375 INJECTION, POWDER, FOR SOLUTION INTRAVENOUS at 07:16

## 2022-01-01 RX ADMIN — GABAPENTIN 100 MG: 100 CAPSULE ORAL at 09:49

## 2022-01-01 RX ADMIN — SODIUM CHLORIDE, PRESERVATIVE FREE 10 ML: 5 INJECTION INTRAVENOUS at 14:35

## 2022-01-01 RX ADMIN — PIPERACILLIN AND TAZOBACTAM 3.38 G: 3; .375 INJECTION, POWDER, FOR SOLUTION INTRAVENOUS at 14:44

## 2022-01-01 RX ADMIN — SODIUM CHLORIDE, PRESERVATIVE FREE 10 ML: 5 INJECTION INTRAVENOUS at 13:30

## 2022-01-01 RX ADMIN — GABAPENTIN 100 MG: 100 CAPSULE ORAL at 09:10

## 2022-01-01 RX ADMIN — FENTANYL CITRATE 50 MCG: 50 INJECTION, SOLUTION INTRAMUSCULAR; INTRAVENOUS at 10:00

## 2022-01-01 RX ADMIN — SODIUM CHLORIDE 1000 ML: 9 INJECTION, SOLUTION INTRAVENOUS at 10:00

## 2022-01-01 RX ADMIN — HEPARIN 500 UNITS: 100 SYRINGE at 12:50

## 2022-01-01 RX ADMIN — I.V. FAT EMULSION 250 ML: 20 EMULSION INTRAVENOUS at 18:43

## 2022-01-01 RX ADMIN — AMIODARONE HYDROCHLORIDE 1 MG/MIN: 50 INJECTION, SOLUTION INTRAVENOUS at 21:15

## 2022-01-01 RX ADMIN — PROPOFOL 25 MG: 10 INJECTION, EMULSION INTRAVENOUS at 07:59

## 2022-01-01 RX ADMIN — SODIUM CHLORIDE, PRESERVATIVE FREE 10 ML: 5 INJECTION INTRAVENOUS at 05:42

## 2022-01-01 RX ADMIN — PROPOFOL 25 MG: 10 INJECTION, EMULSION INTRAVENOUS at 07:52

## 2022-01-01 RX ADMIN — EPINEPHRINE 1 MG: 0.1 INJECTION INTRACARDIAC; INTRAVENOUS at 23:27

## 2022-01-01 RX ADMIN — SODIUM BICARBONATE 100 MEQ: 84 INJECTION, SOLUTION INTRAVENOUS at 22:46

## 2022-01-01 RX ADMIN — PIPERACILLIN AND TAZOBACTAM 3.38 G: 3; .375 INJECTION, POWDER, FOR SOLUTION INTRAVENOUS at 22:14

## 2022-01-01 RX ADMIN — PACLITAXEL 192 MG: 100 INJECTION, POWDER, LYOPHILIZED, FOR SUSPENSION INTRAVENOUS at 10:54

## 2022-01-01 RX ADMIN — GABAPENTIN 100 MG: 100 CAPSULE ORAL at 09:14

## 2022-01-01 RX ADMIN — I.V. FAT EMULSION 250 ML: 20 EMULSION INTRAVENOUS at 18:08

## 2022-01-01 RX ADMIN — PROPOFOL 25 MG: 10 INJECTION, EMULSION INTRAVENOUS at 07:55

## 2022-01-01 RX ADMIN — DEXAMETHASONE SODIUM PHOSPHATE 12 MG: 4 INJECTION, SOLUTION INTRAMUSCULAR; INTRAVENOUS at 11:04

## 2022-01-01 RX ADMIN — OXALIPLATIN 154.5 MG: 5 INJECTION, SOLUTION, CONCENTRATE INTRAVENOUS at 11:11

## 2022-01-01 RX ADMIN — MIDAZOLAM HYDROCHLORIDE 1 MG: 1 INJECTION, SOLUTION INTRAMUSCULAR; INTRAVENOUS at 10:00

## 2022-01-01 RX ADMIN — PIPERACILLIN AND TAZOBACTAM 3.38 G: 3; .375 INJECTION, POWDER, FOR SOLUTION INTRAVENOUS at 07:27

## 2022-01-01 RX ADMIN — SODIUM CHLORIDE 25 ML/HR: 900 INJECTION, SOLUTION INTRAVENOUS at 10:21

## 2022-01-01 RX ADMIN — SODIUM CHLORIDE 100 ML/HR: 9 INJECTION, SOLUTION INTRAVENOUS at 09:31

## 2022-01-01 RX ADMIN — SODIUM BICARBONATE 50 MEQ: 84 INJECTION, SOLUTION INTRAVENOUS at 23:29

## 2022-01-01 RX ADMIN — PIPERACILLIN AND TAZOBACTAM 3.38 G: 3; .375 INJECTION, POWDER, FOR SOLUTION INTRAVENOUS at 15:28

## 2022-01-01 RX ADMIN — SODIUM CHLORIDE, PRESERVATIVE FREE 10 ML: 5 INJECTION INTRAVENOUS at 16:30

## 2022-01-01 RX ADMIN — SODIUM CHLORIDE, PRESERVATIVE FREE 10 ML: 5 INJECTION INTRAVENOUS at 12:50

## 2022-01-01 RX ADMIN — EPINEPHRINE 1 MG: 0.1 INJECTION INTRACARDIAC; INTRAVENOUS at 23:36

## 2022-01-01 RX ADMIN — DIATRIZOATE MEGLUMINE AND DIATRIZOATE SODIUM 100 ML: 660; 100 LIQUID ORAL; RECTAL at 14:07

## 2022-01-01 RX ADMIN — PIPERACILLIN AND TAZOBACTAM 3.38 G: 3; .375 INJECTION, POWDER, FOR SOLUTION INTRAVENOUS at 22:08

## 2022-01-01 RX ADMIN — GEMCITABINE 1536 MG: 38 INJECTION, SOLUTION INTRAVENOUS at 16:18

## 2022-01-01 RX ADMIN — SODIUM CHLORIDE 1000 ML: 9 INJECTION, SOLUTION INTRAVENOUS at 14:50

## 2022-01-01 RX ADMIN — SODIUM CHLORIDE 100 ML: 900 INJECTION, SOLUTION INTRAVENOUS at 14:26

## 2022-01-01 RX ADMIN — POTASSIUM CHLORIDE 10 MEQ: 7.46 INJECTION, SOLUTION INTRAVENOUS at 09:49

## 2022-01-01 RX ADMIN — LIDOCAINE HYDROCHLORIDE 10 ML: 20 INJECTION, SOLUTION INFILTRATION; PERINEURAL at 10:09

## 2022-01-01 RX ADMIN — SODIUM BICARBONATE 50 MEQ: 84 INJECTION, SOLUTION INTRAVENOUS at 23:28

## 2022-01-01 RX ADMIN — PIPERACILLIN AND TAZOBACTAM 3.38 G: 3; .375 INJECTION, POWDER, FOR SOLUTION INTRAVENOUS at 05:04

## 2022-01-01 RX ADMIN — HEPARIN 500 UNITS: 100 SYRINGE at 13:30

## 2022-01-01 RX ADMIN — DEXTROSE MONOHYDRATE 25 ML/HR: 5 INJECTION, SOLUTION INTRAVENOUS at 10:27

## 2022-01-01 RX ADMIN — SODIUM CHLORIDE, PRESERVATIVE FREE 10 ML: 5 INJECTION INTRAVENOUS at 21:39

## 2022-01-01 RX ADMIN — SODIUM CHLORIDE, PRESERVATIVE FREE 10 ML: 5 INJECTION INTRAVENOUS at 09:50

## 2022-01-01 RX ADMIN — Medication 500 UNITS: at 15:10

## 2022-01-01 RX ADMIN — CALCIUM GLUCONATE: 94 INJECTION, SOLUTION INTRAVENOUS at 18:46

## 2022-01-01 RX ADMIN — PROPOFOL 25 MG: 10 INJECTION, EMULSION INTRAVENOUS at 07:45

## 2022-01-01 RX ADMIN — PROPOFOL 25 MG: 10 INJECTION, EMULSION INTRAVENOUS at 07:47

## 2022-01-01 RX ADMIN — PROPOFOL 25 MG: 10 INJECTION, EMULSION INTRAVENOUS at 07:57

## 2022-01-01 RX ADMIN — PIPERACILLIN AND TAZOBACTAM 3.38 G: 3; .375 INJECTION, POWDER, FOR SOLUTION INTRAVENOUS at 09:49

## 2022-01-01 RX ADMIN — SODIUM CHLORIDE, POTASSIUM CHLORIDE, SODIUM LACTATE AND CALCIUM CHLORIDE 125 ML/HR: 600; 310; 30; 20 INJECTION, SOLUTION INTRAVENOUS at 20:45

## 2022-01-01 RX ADMIN — POTASSIUM CHLORIDE 10 MEQ: 7.46 INJECTION, SOLUTION INTRAVENOUS at 09:08

## 2022-01-01 RX ADMIN — SODIUM CHLORIDE 10 ML: 9 INJECTION, SOLUTION INTRAMUSCULAR; INTRAVENOUS; SUBCUTANEOUS at 09:15

## 2022-01-01 RX ADMIN — GADOTERIDOL 15 ML: 279.3 INJECTION, SOLUTION INTRAVENOUS at 14:24

## 2022-01-01 RX ADMIN — DIATRIZOATE MEGLUMINE AND DIATRIZOATE SODIUM 100 ML: 660; 100 LIQUID ORAL; RECTAL at 14:09

## 2022-01-01 RX ADMIN — WATER 2 G: 1 INJECTION INTRAMUSCULAR; INTRAVENOUS; SUBCUTANEOUS at 09:43

## 2022-01-01 RX ADMIN — SODIUM CHLORIDE 25 ML/HR: 9 INJECTION, SOLUTION INTRAVENOUS at 11:15

## 2022-01-01 RX ADMIN — PACLITAXEL 192 MG: 100 INJECTION, POWDER, LYOPHILIZED, FOR SUSPENSION INTRAVENOUS at 12:06

## 2022-01-01 RX ADMIN — SODIUM CHLORIDE, PRESERVATIVE FREE 10 ML: 5 INJECTION INTRAVENOUS at 09:15

## 2022-01-01 RX ADMIN — HEPARIN 500 UNITS: 100 SYRINGE at 13:55

## 2022-01-01 RX ADMIN — SODIUM CHLORIDE, PRESERVATIVE FREE 10 ML: 5 INJECTION INTRAVENOUS at 21:36

## 2022-01-01 RX ADMIN — DEXAMETHASONE SODIUM PHOSPHATE 10 MG: 10 INJECTION INTRAMUSCULAR; INTRAVENOUS at 14:40

## 2022-01-01 RX ADMIN — EPINEPHRINE 1 MG: 0.1 INJECTION INTRACARDIAC; INTRAVENOUS at 22:42

## 2022-05-06 NOTE — PROCEDURES
[address]  [address] [facility] [address] [facility], [address]  [phone]                                Endoscopic Ultrasound    NAME:  Fide Cope Sr.   :   1945   MRN:   [id number]       Date/Time:  2022   Procedure Type: Linear Upper EUS with FNB      Indications: Pancreatic mass    Pre-operative Diagnosis: see indication above    Post-operative Diagnosis:  See findings below    : Mohamud Feliz MD    Surgical Assistant: Endoscopy Technician-1: Tatum Eckert  Endoscopy RN-1: Yoly Dykes RN    Implants: none    Referring Provider: Oseas Miranda MD    Anethesia/Sedation:  MAC anesthesia      Procedure Details   After infom consent was obtained for the procedure, with all risks and benefits of procedure explained the patient was taken to the endoscopy suite and placed in the left lateral decubitus position. Following sequential administration of sedation as per above, the linear echoendoscope was inserted into the mouth and advanced under direct vision to second portion of the duodenum. A careful inspection was made as the gastroscope was withdrawn, including a retroflexed view of the proximal stomach; findings and interventions are described below. Findings:     Endoscopic:   Esophagus:Irregular Z line. Normal mucosa rest of the esophagus. Stomach: normal    Duodenum/jejunum: normal    Ultrasound:   Esophagus: not examined   Stomach: not examined   Pancreas:     Areas examined: the entire gland    Parenchyma: -2 masses were noted in the pancreas, 1 and the body of the pancreas and the second 1 in the tail of the pancreas. Mass in the tail of the pancreas was heterogeneous, irregular and measured about 37 mm x 29 mm in size. The mass was abutting the splenic artery. No perilesional lymph nodes were noted in this area. Mass in the body of the pancreas measured about 36 mm x 32 mm in size.   Mass was heterogeneous, irregular and was abutting the celiac axis. No infiltration into the celiac axis branches was noted. No perilesional lymph nodes were noted. Pancreatic duct was dilated and tortuous leading up to the mass in the body. Pancreas head was normal.    Pancreatic Duct: Dilated and tortuous in between the pancreas body and tail mass. Pancreatic duct was normal in the head region. Duct could not be visualized in the region of the pancreas body mass suggesting obstruction   Liver:     Parenchyma: not examined    Gallbladder: not examined    Bile Duct: the common bile duct was normal in caliber and without any filling defect               Lymph Node: no adenopathy        Specimen Removed:  1. Fine-needle biopsy of the pancreas tail mass; 2.  Fine-needle biopsy of pancreas body mass    Complications: None. EBL:  None.     Interventions: Fine needle aspirate for biopsy of  pancreas body and tail mass using separate 22 gauge shark core needle for each site with 2 of passes at each site with preliminary results suggesting indetermined      Recommendations:   -Await cytology  -Consult oncology (Dr Hector Fowler) and surgery (Dr Karen Espinoza)  -Continue current medications    Gilberto Rodas MD  5/6/2022  8:11 AM

## 2022-05-06 NOTE — PROGRESS NOTES

## 2022-05-06 NOTE — ANESTHESIA POSTPROCEDURE EVALUATION
Post-Anesthesia Evaluation and Assessment    Patient: Maranda Bergeron Sr. MRN: [id number]  SSN: [id number]    YOB: 1945  Age: 67 y.o. Sex: male       Cardiovascular Function/Vital Signs  Visit Vitals  /78   Pulse 93   Resp 14   Ht 5' 10\" (1.778 m)   Wt 74.4 kg (164 lb)   SpO2 98%   BMI 23.53 kg/m²       Patient is status post MAC anesthesia for Procedure(s):  LINEAR ENDOSCOPIC ULTRASOUND (EUS)  ESOPHAGOGASTRODUODENOSCOPY (EGD)  FINE NEEDLE ASPIRATION. Nausea/Vomiting: None    Postoperative hydration reviewed and adequate. Pain:  Pain Scale 1: Numeric (0 - 10) (05/06/22 0830)  Pain Intensity 1: 0 (05/06/22 0830)   Managed    Neurological Status: At baseline    Mental Status and Level of Consciousness: Alert and oriented to person, place, and time    Pulmonary Status:   O2 Device: None (Room air) (05/06/22 0830)   Adequate oxygenation and airway patent    Complications related to anesthesia: None    Post-anesthesia assessment completed. No concerns    Signed By: Liliana Ellis MD     May 6, 2022              Procedure(s):  LINEAR ENDOSCOPIC ULTRASOUND (EUS)  ESOPHAGOGASTRODUODENOSCOPY (EGD)  FINE NEEDLE ASPIRATION. MAC    <[email or web address]>    INITIAL Post-op Vital signs:   Vitals Value Taken Time   /79 05/06/22 0835   Temp     Pulse 89 05/06/22 0835   Resp 15 05/06/22 0835   SpO2 97 % 05/06/22 0834   Vitals shown include unvalidated device data.

## 2022-05-06 NOTE — DISCHARGE INSTRUCTIONS
[address] [facility].  217 [facility] [address] [place] [id number]  [address]  [id number]  1945    DISCOMFORT:  Sore throat- throat lozenges or warm salt water gargle  redness at IV site- apply warm compress to area; if redness or soreness persist- contact your physician  Gaseous discomfort- walking, belching will help relieve any discomfort    DIET  You may eat and drink after you leave. You may resume your regular diet - however -  remember your colon is empty and a heavy meal will produce gas. Avoid these foods:  vegetables, fried / greasy foods, carbonated drinks   You may not drink alcoholic beverages for at least 12 hours    ACTIVITY  You may resume your normal daily activities   Spend the remainder of the day resting -  avoid any strenuous activity. You may not operate a vehicle for 12 hours  You may not engage in an occupation involving machinery or appliances for rest of today  Avoid making any critical decisions for at least 24 hour    CALL M.D. ANY SIGN OF   Increasing pain, nausea, vomiting  Abdominal distension (swelling)  New increased bleeding (oral or rectal)  Fever (chills)  Pain in chest area  Bloody discharge from nose or mouth  Shortness of breath    Follow-up Instructions:   Call Dr. Nyla Ragland for any questions or problems. If we took a biopsy please call the office within 2 weeks to discuss your pathology results. Telephone # [phone]       ENDOSCOPY FINDINGS:   Irregular Z Line  Pancreas Body & Tail Mass       Post-procedure recommendations:   -Await cytology  -Consult oncology (Dr James Vasquez) and surgery (Dr Antoine Bartlett)  -Continue current medications    Learning About Coronavirus (COVID-19)  Coronavirus (COVID-19): Overview  What is coronavirus (COVID-19)? The coronavirus disease (COVID-19) is caused by a virus. It is an illness that was first found in [place], [place], in December 2019.  It has since spread worldwide. The virus can cause fever, cough, and trouble breathing. In severe cases, it can cause pneumonia and make it hard to breathe without help. It can cause death. Coronaviruses are a large group of viruses. They cause the common cold. They also cause more serious illnesses like Middle East respiratory syndrome (MERS) and severe acute respiratory syndrome (SARS). COVID-19 is caused by a novel coronavirus. That means it's a new type that has not been seen in people before. This virus spreads person-to-person through droplets from coughing and sneezing. It can also spread when you are close to someone who is infected. And it can spread when you touch something that has the virus on it, such as a doorknob or a tabletop. What can you do to protect yourself from coronavirus (COVID-19)? The best way to protect yourself from getting sick is to:  · Avoid areas where there is an outbreak. · Avoid contact with people who may be infected. · Wash your hands often with soap or alcohol-based hand sanitizers. · Avoid crowds and try to stay at least 6 feet away from other people. · Wash your hands often, especially after you cough or sneeze. Use soap and water, and scrub for at least 20 seconds. If soap and water aren't available, use an alcohol-based hand . · Avoid touching your mouth, nose, and eyes. What can you do to avoid spreading the virus to others? To help avoid spreading the virus to others:  · Cover your mouth with a tissue when you cough or sneeze. Then throw the tissue in the trash. · Use a disinfectant to clean things that you touch often. · Stay home if you are sick or have been exposed to the virus. Don't go to school, work, or public areas. And don't use public transportation. · If you are sick:  ? Leave your home only if you need to get medical care. But call the doctor's office first so they know you're coming.  And wear a face mask, if you have one.  ? If you have a face mask, wear it whenever you're around other people. It can help stop the spread of the virus when you cough or sneeze. ? Clean and disinfect your home every day. Use household  and disinfectant wipes or sprays. Take special care to clean things that you grab with your hands. These include doorknobs, remote controls, phones, and handles on your refrigerator and microwave. And don't forget countertops, tabletops, bathrooms, and computer keyboards. When to call for help  Call 911 anytime you think you may need emergency care. For example, call if:  · You have severe trouble breathing. (You can't talk at all.)  · You have constant chest pain or pressure. · You are severely dizzy or lightheaded. · You are confused or can't think clearly. · Your face and lips have a blue color. · You pass out (lose consciousness) or are very hard to wake up. Call your doctor now if you develop symptoms such as:  · Shortness of breath. · Fever. · Cough. If you need to get care, call ahead to the doctor's office for instructions before you go. Make sure you wear a face mask, if you have one, to prevent exposing other people to the virus. Where can you get the latest information? The following health organizations are tracking and studying this virus. Their websites contain the most up-to-date information. Gary Griffith also learn what to do if you think you may have been exposed to the virus. · U.S. Centers for Disease Control and Prevention (CDC): The CDC provides updated news about the disease and travel advice. The website also tells you how to prevent the spread of infection. www.cdc.gov  · World Health Organization [facility]): WHO offers information about the virus outbreaks. WHO also has travel advice. www.who.int  Current as of: April 1, 2020               Content Version: 12.4  © 6233-4694 [employer].    Care instructions adapted under license by your healthcare professional. If you have questions about a medical condition or this instruction, always ask your healthcare professional. [place] [address] any warranty or liability for your use of this information.

## 2022-05-06 NOTE — ANESTHESIA PREPROCEDURE EVALUATION
Relevant Problems   No relevant active problems       Anesthetic History   No history of anesthetic complications            Review of Systems / Medical History  Patient summary reviewed, nursing notes reviewed and pertinent labs reviewed    Pulmonary  Within defined limits                 Neuro/Psych   Within defined limits           Cardiovascular    Hypertension          Hyperlipidemia    Exercise tolerance: >4 METS     GI/Hepatic/Renal  Within defined limits              Endo/Other        Cancer     Other Findings   Comments: Glaucoma  Prostate cancer ([facility]  Claudication (HCC)  Hypercholesterolemia  Hypertension  Thrombocytopenia (HCC)  Constipation  Pancreatitis           Physical Exam    Airway  Mallampati: II  TM Distance: > 6 cm  Neck ROM: normal range of motion   Mouth opening: Normal     Cardiovascular  Regular rate and rhythm,  S1 and S2 normal,  no murmur, click, rub, or gallop             Dental    Dentition: Full lower dentures and Full upper dentures     Pulmonary  Breath sounds clear to auscultation               Abdominal  GI exam deferred       Other Findings            Anesthetic Plan    ASA: 2  Anesthesia type: MAC            Anesthetic plan and risks discussed with: Patient

## 2022-05-06 NOTE — H&P
118 [facility].  217 [facility] 140 [facility], [address]  [phone]                                History and Physical     NAME: Philip Tovar   :  1945   MRN:  [id number]     HPI:  The patient was seen and examined. Past Surgical History:   Procedure Laterality Date    ENDOSCOPY, COLON, DIAGNOSTIC      Normal; Dr. Woodson Favors [address]  2017    HX ORTHOPAEDIC      right wrist surgery    HX OTHER SURGICAL      torn cartridge in L knee    HX PROSTATECTOMY       Past Medical History:   Diagnosis Date    Claudication [facility])     Constipation 10/22/2012    Glaucoma     Hypercholesterolemia     Hypertension     Pancreatitis 10/2012    Prostate cancer ([facility]     s/p prostatectomy    Thrombocytopenia ([facility] 2010     Social History     Tobacco Use    Smoking status: Passive Smoke Exposure - Never Smoker    Smokeless tobacco: Not on file   Substance Use Topics    Alcohol use: No     Comment: ocassionally    Drug use: No     Types: OTC, Prescription     Allergies   Allergen Reactions    Shellfish Derived Swelling    Zoloft [Sertraline] Other (comments)     Insomnia     Family History   Problem Relation Age of Onset    Cancer Mother     Hypertension Mother     Hypertension Brother     Heart Disease Father     Hypertension Brother      No current facility-administered medications for this encounter. PHYSICAL EXAM:  General: WD, WN. Alert, cooperative, no acute distress    HEENT: NC, Atraumatic. PERRLA, EOMI. Anicteric sclerae. Lungs:  CTA Bilaterally. No Wheezing/Rhonchi/Rales. Heart:  Regular  rhythm,  No murmur, No Rubs, No Gallops  Abdomen: Soft, Non distended, Non tender. +Bowel sounds, no HSM  Extremities: No c/c/e  Neurologic:  CN 2-12 gi, Alert and oriented X 3. No acute neurological distress   Psych:   Good insight. Not anxious nor agitated.     The heart, lungs and mental status were satisfactory for the administration of MAC sedation and for the procedure. Mallampati score: 2     The patient was counseled at length about the risks of dasha Covid-19 in the raquel-operative and post-operative states including the recovery window of their procedure. The patient was made aware that dasha Covid-19 after a surgical procedure may worsen their prognosis for recovering from the virus and lend to a higher morbidity and or mortality risk. The patient was given the options of postponing their procedure. All of the risks, benefits, and alternatives were discussed. The patient does  wish to proceed with the procedure.       Assessment:   · Pancreas mass    Plan:   · Endoscopic procedure  · MAC sedation   ·

## 2022-05-13 PROBLEM — C25.9 MALIGNANT NEOPLASM OF PANCREAS (HCC): Status: ACTIVE | Noted: 2022-01-01

## 2022-05-13 NOTE — PROGRESS NOTES
Mayco Cox is a 67 y.o. male  Chief Complaint   Patient presents with    New Patient     Pancreas Cancer      1. Have you been to the ER, urgent care clinic since your last visit? Hospitalized since your last visit? No.    2. Have you seen or consulted any other health care providers outside of the [address] [place] [address] since your last visit? Include any pap smears or colon screening.  No.

## 2022-05-13 NOTE — PROGRESS NOTES
[facility] [place] at [address], [id number] [place] [address] Road, [address] [place] [address]  W: [phone]  F: [phone]    Reason for Visit:   Mel Pozo is a 67 y.o. male who is seen in consultation at the request of Dr. Sampson Ramirez for evaluation of Pancreatic adenocarcinoma     Treatment History:   · None from us yet    History of Present Illness:   Patient is a 67 y.o. male seen for above    Presented with 3 weeks of abdominal pain 4/19/2022. He was started on Cipro flagyl with some improvement but not complete resolution. He then had a CT scan which showed hypoechoic masses in the pancreas with infiltration of the celiac axis. He had an MRI abdomen on 4/28/2022 which showed a 2.8 x 3.9 x 2.7 cm mass and in the tail of the pancreas there is 2.3 x 3.6 x 2.3 cm mass abutting the celiac axis and potentially occluding left gastric artery and likely responsible for 5 mm dilation of pancreatic duct into the pancreatic tail. Also noted were 1.4 x 1.7 cm left periaortic lymph node and 1.1 x 1.6 cm right common iliac  node concerning for metastatic neoplasm. EUS on 5/6/2022 showed  2 masses in the pancreas, 1 and the body of the pancreas and the second in the tail of the pancreas. Mass in the tail of the pancreas was heterogeneous, irregular and measured about 37 mm x 29 mm in size. The mass was abutting the splenic artery. Mass in the body of the pancreas measured about 36 mm x 32 mm in size. Mass was heterogeneous, irregular and was abutting the celiac axis. Biopsies from both revealed adenocarcinoma. He his sent to review management. He has a long standing history of ITP ( Work up included a BM bx in 2010). He has seen Dr. Haven Vera for this previously. He has a h/o [place] 3+3 prostate cancer s/p prostatectomy in 2005.  Had a biochemical recurrence with PSA upto 1.2 by 2016 at which time he had biopsy proven local recurrence with [place] 3+4 disease s/p radiation on 4/2016 followed by an undetectable PSA atleast until 2018. He takes Tylenol for his pain which is 3/10, he has , has chronic constipation, goes once a week. He has no nausea,         Past Medical History:   Diagnosis Date    [facility]ication ([facility]     Constipation 10/22/2012    Glaucoma     Hypercholesterolemia     Hypertension     Pancreatitis 10/2012    Prostate cancer ([facility]     s/p prostatectomy    Thrombocytopenia ([facility] 7/27/2010      Past Surgical History:   Procedure Laterality Date    ENDOSCOPY, COLON, DIAGNOSTIC  1999    Normal; Dr. Maxx Gonzales [address]  04/2017    HX ORTHOPAEDIC      right wrist surgery    HX OTHER SURGICAL      torn cartridge in L knee    HX PROSTATECTOMY  2005      Social History     Tobacco Use    Smoking status: Passive Smoke Exposure - Never Smoker    Smokeless tobacco: Not on file   Substance Use Topics    Alcohol use: No     Comment: ocassionally      Family History   Problem Relation Age of Onset    Cancer Mother     Hypertension Mother     Hypertension Brother     Heart Disease Father     Hypertension Brother      Current Outpatient Medications   Medication Sig    lisinopril-hydroCHLOROthiazide (PRINZIDE, ZESTORETIC) 20-25 mg per tablet TAKE 1 TABLET TWICE DAILY    atorvastatin (LIPITOR) 40 mg tablet TAKE 1 TABLET EVERY NIGHT    verapamil ER (CALAN-SR) 240 mg CR tablet TAKE 1 TABLET EVERY DAY    PSYLLIUM SEED, WITH SUGAR, (METAMUCIL, SUGAR, PO) Take  by mouth.  senna (SENNA) 8.6 mg tablet Take 1 Tab by mouth daily.  Omeprazole delayed release (PRILOSEC D/R) 20 mg tablet Take 1 Tab by mouth daily.  MULTIVITAMINS (MULTIVITAMIN PO) Take  by mouth. No current facility-administered medications for this visit. Allergies   Allergen Reactions    Shellfish Derived Swelling    Zoloft [Sertraline] Other (comments)     Insomnia        Review of Systems: A complete review of systems was obtained, negative except as described above.     Physical Exam:     Visit Vitals  /86 (BP 1 Location: Left upper arm, BP Patient Position: Sitting)   Pulse (!) 102   Temp 97.3 °F (36.3 °C) (Temporal)   Ht 5' 10\" (1.778 m)   Wt 164 lb (74.4 kg)   SpO2 97%   BMI 23.53 kg/m²     ECOG PS: 1  General: No distress  Eyes: PERRLA, anicteric sclerae  HENT: Atraumatic  Neck: Supple  Lymphatic: No cervical, supraclavicular, or inguinal adenopathy  Respiratory: normal respiratory effort  CV: Normal rate, no peripheral edema  GI: nondistended  MS: Normal gait and station. Digits without clubbing or cyanosis. Skin: No rashes, ecchymoses, or petechiae. Normal temperature, turgor, and texture. Psych: Alert, oriented, appropriate affect, normal judgment/insight    Results:     Lab Results   Component Value Date/Time    WBC 6.0 08/12/2017 08:46 AM    HGB 13.8 08/12/2017 08:46 AM    HCT 42.3 08/12/2017 08:46 AM    PLATELET 60 (LL) 23/22/2734 08:46 AM    MCV 91 08/12/2017 08:46 AM    ABS. NEUTROPHILS 2.9 08/12/2017 08:46 AM     Lab Results   Component Value Date/Time    Sodium 138 08/12/2017 08:46 AM    Potassium 4.3 08/12/2017 08:46 AM    Chloride 99 08/12/2017 08:46 AM    CO2 21 08/12/2017 08:46 AM    Glucose 101 (H) 08/12/2017 08:46 AM    BUN 27 08/12/2017 08:46 AM    Creatinine 1.41 (H) 08/12/2017 08:46 AM    GFR est AA 57 (L) 08/12/2017 08:46 AM    GFR est non-AA 49 (L) 08/12/2017 08:46 AM    Calcium 9.4 08/12/2017 08:46 AM     Lab Results   Component Value Date/Time    Bilirubin, total 0.4 08/12/2017 08:46 AM    ALT (SGPT) 22 08/12/2017 08:46 AM    Alk. phosphatase 59 08/12/2017 08:46 AM    Protein, total 6.2 08/12/2017 08:46 AM    Albumin 4.2 08/12/2017 08:46 AM    Globulin 3.1 10/03/2012 04:53 AM       Lab Results   Component Value Date/Time    Lipase 53 12/03/2016 08:52 AM       Lab Results   Component Value Date/Time    INR 1.0 04/05/2010 08:43 AM    aPTT 29.3 04/05/2010 08:43 AM       Records reviewed and summarized above. Pathology report(s) reviewed above.   Radiology report(s) reviewed above.    1. There are 2 pancreatic mass lesions suspicious for neoplasm with features  suggestive of adenocarcinoma with lesion in the pancreatic body abutting the  celiac axis and potentially occluding left gastric artery and likely responsible  for 5 mm dilation of pancreatic duct into the pancreatic tail. 2. 1.4 x 1.7 cm left periaortic lymph node and 1.1 x 1.6 cm right common iliac  node concerning for metastatic neoplasm, possibly prostatic in this patient with  history of prostate cancer and prostatectomy. 3. No MRI evidence of other metastatic disease      Assessment:   1) Pancreatic masses-Adenocarcinoma    Presented to with epigastric pain. Noted to have 2 adjustment masses in the pancreatic body and tail measuring about 3.6 to 3.9 cm. Mass is about the celiac plexus and splenic artery. Scan showed a 1.4 x 1.7 cm left periaortic lymph node as well as a 1.1 x 1.6 cm right common iliac node. Latter unusual for metastatic site from pancreatic primary    Suspect intrapancreatic metastases from pancreatic adenocarcinoma. Less likely is metastases from other primary sites. The nature of the lymph nodes is unclear. Appears to be borderline resectable at the moment    Reviewed obtaining a PET scan to determine whether guadalupe findings are due to metastatic disease. If the PSA is normal with an FDG avid lymph node and this is likely metastatic disease. We will also review scans in tumor board    I discussed the management of borderline resectable/advanced pancreatic cancer. We discussed the role of chemotherapy  He is noted to have a long history of thrombocytopenia which may limit doses    2) history of prostate cancer  [place] 3+3 status post prostatectomy 2005  Recurrent disease [place] 3+4 in 2016 status postradiation with undetectable PSA    3) Epigastric pain  Well-controlled on Tylenol    4) thrombocytopenia  External notes from [facility] were reviewed.   It is noted that he had an extensive work-up including a bone marrow biopsy that showed normal number of megakaryocytes. Presumed to be ITP. Counts have been around 60,000 and has not required treatment in the past.      5) psychosocial  He takes care of his wife  Is independent  Supportive son    Plan:     · CA 19-9, CBC, CMP, PSA  · PET CT  · Review scans in tumor board    RTC 2 weeks    I appreciate the opportunity to participate in Mr. Jeff Dickey Sr.'s care.     Signed By: Suni Aceves MD

## 2022-05-13 NOTE — TELEPHONE ENCOUNTER
Patient's spouse called to reschedule follow up OV appt, due to a conflict in schedule. Contact has confirmed reschedule to 6/2/22 @ 10:45am (On-Site).

## 2022-05-23 NOTE — TELEPHONE ENCOUNTER
1115:  Returned call to patient's spouse Gia Dalton)   Patient answer and confirmed x2 identifiers   Patient confirmed hoarseness   Denies any swallowing or breathing problems  No sore throat or swollen areas on neck   Denies fevers, chills, or shortness of breath  Appetite ok; has gone down a bit; able to eat breakfast and dinner; experiences bloating with meals   Reviewed use of gas-x and small frequent meals  Patient also reporting current pain management regimen is not working   Taking tylenol every bid; 500 in am and 500 in pm   Wishes to see if able to try something else   Fara [address] [place] [address]    Patient and spouse wishing to know recent results if needs to be seen sooner; informed both that results were to be reviewed in upcoming tumor board but would send message to MD Albright   Informed patient would send message to team    Both verbalized understanding   No further questions at this time     [phone]:  Called and spoke with patient   Informed Rx for Tramadol sent to pharmacy on file   Patient verbalized understanding; no questions at this time

## 2022-05-23 NOTE — TELEPHONE ENCOUNTER
Patient wife called and left voicemail needed to discuss the following items:  Patient is very hoarse and would like to know what to do 2) She would like to know his test results.        # [phone]

## 2022-05-24 NOTE — PROGRESS NOTES
[facility] [place] at [address], [address] [place] [address] Road, [address] [place] [address]  W: [phone]  F: [phone]    Reason for Visit:   Sidney Marks is a 67 y.o. male who is seen for Pancreatic adenocarcinoma - stage IV     Treatment History:   · None from us yet    History of Present Illness:   Patient is a 67 y.o. male seen for above    Presented with 3 weeks of abdominal pain 4/19/2022. He was started on Cipro flagyl with some improvement but not complete resolution. He then had a CT scan which showed hypoechoic masses in the pancreas with infiltration of the celiac axis. He had an MRI abdomen on 4/28/2022 which showed a 2.8 x 3.9 x 2.7 cm mass and in the tail of the pancreas there is 2.3 x 3.6 x 2.3 cm mass abutting the celiac axis and potentially occluding left gastric artery and likely responsible for 5 mm dilation of pancreatic duct into the pancreatic tail. Also noted were 1.4 x 1.7 cm left periaortic lymph node and 1.1 x 1.6 cm right common iliac  node concerning for metastatic neoplasm. EUS on 5/6/2022 showed  2 masses in the pancreas, 1 and the body of the pancreas and the second in the tail of the pancreas. Mass in the tail of the pancreas was heterogeneous, irregular and measured about 37 mm x 29 mm in size. The mass was abutting the splenic artery. Mass in the body of the pancreas measured about 36 mm x 32 mm in size. Mass was heterogeneous, irregular and was abutting the celiac axis. Biopsies from both revealed adenocarcinoma. He has a long standing history of ITP ( Work up included a BM bx in 2010). He has seen Dr. Simon Rainey for this previously. He has a h/o [place] 3+3 prostate cancer s/p prostatectomy in 2005. Had a biochemical recurrence with PSA upto 1.2 by 2016 at which time he had biopsy proven local recurrence with [place] 3+4 disease s/p radiation on 4/2016 followed by an undetectable PSA atleast until 2018. His repeat PSA 5/2022 was < 0.1. He is seen with PET CT. His has had pain in the epigastric pain , no radiation, down to 5/10, was 9/10. Worse when he eats. Started creon yesterday. He has no constipation. Comes with son and wife- Media Goltz. Past Medical History:   Diagnosis Date    Claudication [facility])     Constipation 10/22/2012    Glaucoma     Hypercholesterolemia     Hypertension     Pancreatitis 10/2012    Pro[facility] ([facility]     s/p prostatectomy    Thrombocytopenia ([facility] 7/27/2010      Past Surgical History:   Procedure Laterality Date    ENDOSCOPY, COLON, DIAGNOSTIC  1999    Normal; Dr. Ana Rosa Mesa [address]  04/2017    HX ORTHOPAEDIC      right wrist surgery    HX OTHER SURGICAL      torn cartridge in L knee    HX PROSTATECTOMY  2005      Social History     Tobacco Use    Smoking status: Passive Smoke Exposure - Never Smoker    Smokeless tobacco: Never Used   Substance Use Topics    Alcohol use: No     Comment: ocassionally      Family History   Problem Relation Age of Onset    Cancer Mother     Hypertension Mother     Hypertension Brother     Heart Disease Father     Hypertension Brother      Current Outpatient Medications   Medication Sig    oxyCODONE IR (ROXICODONE) 5 mg immediate release tablet Take 1 Tablet by mouth every four (4) hours as needed for Pain for up to 14 days. Max Daily Amount: 30 mg.    lisinopril-hydroCHLOROthiazide (PRINZIDE, ZESTORETIC) 20-25 mg per tablet TAKE 1 TABLET TWICE DAILY    atorvastatin (LIPITOR) 40 mg tablet TAKE 1 TABLET EVERY NIGHT    verapamil ER (CALAN-SR) 240 mg CR tablet TAKE 1 TABLET EVERY DAY    PSYLLIUM SEED, WITH SUGAR, (METAMUCIL, SUGAR, PO) Take  by mouth.  senna (SENNA) 8.6 mg tablet Take 1 Tab by mouth daily.  MULTIVITAMINS (MULTIVITAMIN PO) Take  by mouth. No current facility-administered medications for this visit.       Allergies   Allergen Reactions    Shellfish Derived Swelling    Zoloft [Sertraline] Other (comments)     Insomnia        Review of Systems: A complete review of systems was obtained, negative except as described above. Physical Exam:     Visit Vitals  /69 (BP 1 Location: Left upper arm, BP Patient Position: Sitting)   Pulse (!) 103   Temp 98.2 °F (36.8 °C)   Resp 18   Wt 164 lb (74.4 kg)   SpO2 98%   BMI 23.53 kg/m²     ECOG PS: 1  General: No distress  Eyes: PERRLA, anicteric sclerae  HENT: Atraumatic  Neck: Supple  Lymphatic: No cervical, supraclavicular, or inguinal adenopathy  Respiratory: normal respiratory effort  CV: Normal rate, no peripheral edema  GI: nondistended  Skin: No rashes, ecchymoses, or petechiae. Normal temperature, turgor, and texture. Psych: Alert, oriented, appropriate affect, normal judgment/insight    Results:     Lab Results   Component Value Date/Time    WBC 6.8 05/16/2022 09:06 AM    HGB 14.0 05/16/2022 09:06 AM    HCT 40.4 05/16/2022 09:06 AM    PLATELET 342 (L) 23/95/0509 09:06 AM    MCV 90 05/16/2022 09:06 AM    ABS. NEUTROPHILS 3.7 05/16/2022 09:06 AM     Lab Results   Component Value Date/Time    Sodium 137 05/16/2022 09:06 AM    Potassium 4.2 05/16/2022 09:06 AM    Chloride 95 (L) 05/16/2022 09:06 AM    CO2 21 05/16/2022 09:06 AM    Glucose 143 (H) 05/16/2022 09:06 AM    BUN 22 05/16/2022 09:06 AM    Creatinine 1.28 (H) 05/16/2022 09:06 AM    GFR est AA 57 (L) 08/12/2017 08:46 AM    GFR est non-AA 49 (L) 08/12/2017 08:46 AM    Calcium 10.3 (H) 05/16/2022 09:06 AM    Glucose (POC) 141 (H) 05/19/2022 10:45 AM     Lab Results   Component Value Date/Time    Bilirubin, total 0.7 05/16/2022 09:06 AM    ALT (SGPT) 19 05/16/2022 09:06 AM    Alk.  phosphatase 62 05/16/2022 09:06 AM    Protein, total 7.0 05/16/2022 09:06 AM    Albumin 4.7 05/16/2022 09:06 AM    Globulin 3.1 10/03/2012 04:53 AM       Lab Results   Component Value Date/Time    Lipase 53 12/03/2016 08:52 AM       Lab Results   Component Value Date/Time    INR 1.0 04/05/2010 08:43 AM    aPTT 29.3 04/05/2010 08:43 AM     Prostate Specific Ag   Date Value   05/16/2022 <0.1 ng/mL   04/04/2017 <0.1 ng/mL   02/23/2012 0.7 ng/mL   10/13/2009 0.4 NG/ML     CA 19-9:  Recent Labs     05/16/22  0906   [id number] <2       Records reviewed and summarized above. Pathology report(s) reviewed above. Radiology report(s) reviewed above. 1. There are 2 pancreatic mass lesions suspicious for neoplasm with features  suggestive of adenocarcinoma with lesion in the pancreatic body abutting the  celiac axis and potentially occluding left gastric artery and likely responsible  for 5 mm dilation of pancreatic duct into the pancreatic tail. 2. 1.4 x 1.7 cm left periaortic lymph node and 1.1 x 1.6 cm right common iliac  node concerning for metastatic neoplasm, possibly prostatic in this patient with  history of prostate cancer and prostatectomy. 3. No MRI evidence of other metastatic disease    PET CT 5/19/2022    IMPRESSION  1. RML lesion, indeterminate. 2. LLL punctate nodule, indeterminate. 3. Two pancreatic masses. 4. Two abdominal midline wall tumor deposits. 5. Metastatic retroperitoneal nodes. 6. Peritoneal tumor implants. Assessment:   1) Pancreatic masses-Adenocarcinoma- stage IV     Presented to with epigastric pain. Noted to have 2 adjustment masses in the pancreatic body and tail measuring about 3.6 to 3.9 cm. Mass is about the celiac plexus and splenic artery. Scan showed a 1.4 x 1.7 cm left periaortic lymph node as well as a 1.1 x 1.6 cm right common iliac node. PET CT reviewed and noted tumor deposits, metastatic RP nodes and peritoneal carcinomatosis    Consistent with pancreatic cancer, stage IV  Reviewed in tumor board. Discussed scans and path. Discussed that management is palliative. He is noted to have a long history of thrombocytopenia which may limit doses  Discussed palliative Gemzar+ Abraxane . Discussed the role of genetic and NGS testing    We discussed the chemotherapy regimen, it's logistics, and potential toxicities in detail.  Potential side effects include, but are not limited to, nausea, vomiting, diarrhea, taste changes, myelosuppression, infection, fatigue, allergic reactions, rash, edema, neuropathy, and rarely, death. The patient asked several well thought out questions which I answered to the best of my ability and to their apparent satisfaction. The patient has given consent for chemotherapy. 2) history of prostate cancer  [place] 3+3 status post prostatectomy 2005  Recurrent disease [place] 3+4 in 2016 status postradiation with undetectable PSA    3) Epigastric pain  Poorly controlled  Creon, oxycodone, if not improved then will refer back to Dr. Andrei Sandoval for Celiac plexus block     4) thrombocytopenia  [facility]. It is noted that he had an extensive work-up including a bone marrow biopsy that showed normal number of megakaryocytes. Presumed to be ITP. Counts have been around 60,000 and has not required treatment in the past.      5) Encounter for high risk disease disease       Plan:     · Approval for Gemzar and Abraxane given every 2 weeks until progression  · Genetic testing today  · Antiemetics per protocol  · Oxycodone prn pain. OK to alternate with Tylenol  · Port  · Refer to palliative care next visit  · Creon started  · RD to follow     RTC with each cycle     I appreciate the opportunity to participate in  Rikki Cuco Griffith's care.     Signed By: Bhumika Ratliff MD

## 2022-05-24 NOTE — TELEPHONE ENCOUNTER
Patient's spouse called stating that the \"Tramadol\", received yesterday, is not working. Patient would like a call back. Additionally, the patient F/U is scheduled for 6/2/22 @ 10:45am.  Caller would like the patient to be seen sooner, please advise if that is necessary.

## 2022-05-24 NOTE — TELEPHONE ENCOUNTER
1003:  Returned call to [facility] confirmed   Stated Tramadol; has been taking every 6 hours; regimen not working    Pain in abdomen; pain is 8/9 out of 10 on 0-10 pain scale   Last bowel movement 5/25/22   No fevers, chills, nausea, or vomiting noted     Patient requesting to be seen sooner and does not know what to do with his abdominal pain     1050:  Called and spoke with both Satinder Gray and patient   Informed MD Queenie Fuchs is adjusting pain medication; patient to stop Tramadol and start oxycodone   Informed Dietitian contacted and would discuss use of CREON   Both verbalized understanding   No further questions at this time

## 2022-05-24 NOTE — TELEPHONE ENCOUNTER
[facility] [place] at [facility]   Isabel Concepcion [address]: [phone]  F: [phone]    Medical Nutrition Therapy  Nutrition Referral:    Referral received from Dr Queenie Fuchs regarding exocrine pancreatic insufficiency. Called and spoke with both patient and wife. Explained that RD is available to address nutrition throughout the spectrum of care. He reports gas/bloating after eating. Constipation due to opioids. Suspect he is experiencing exocrine pancreatic insufficiency and would benefit from Creon. Discussed how to take medication and importance of taking with food. Will send info via [employer] regarding pancreatic enzymes. Encouraged them to reach out for any additional questions or concerns. Provided contact information.       Wt Readings from Last 5 Encounters:   05/19/22 159 lb (72.1 kg)   05/13/22 164 lb (74.4 kg)   05/06/22 164 lb (74.4 kg)   08/16/17 200 lb 3.2 oz (90.8 kg)   04/07/17 191 lb 3.2 oz (86.7 kg)         Signed By: Alfredo Arvizu, [address] [employer]

## 2022-05-25 NOTE — PROGRESS NOTES
Klaus Garcia. is a 67 y.o. male    Chief Complaint   Patient presents with    Follow-up     Pancreatic adenocarcinoma - stage IV        1. Have you been to the ER, urgent care clinic since your last visit? Hospitalized since your last visit? No    2. Have you seen or consulted any other health care providers outside of the [address] [place] [address] since your last visit? Include any pap smears or colon screening.  No

## 2022-05-25 NOTE — PROGRESS NOTES
Pharmacy Note- Chemotherapy Education    Jeff Dickey Sr. is a  67 y. o.male  diagnosed with pancreatic cancer here today for  chemotherapy counseling and consent. Mr. Nissa Larkin is being treated with gemcitabine albumin bound PACLItaxel. Provided education on gemcitabine albumin bound PACLItaxel and premedications - ondansetron and dexamethasone. Reviewed cryotherapy for prevention of mouth sores and peripheral neuropathy. Supportive care medications sent to pharmacy. Side effects of chemotherapy reviewed included s/s infection, anemia, appetite changes, thrombocytopenia, fatigue, hair loss/alopecia, bone pain, skin and nail changes, diarrhea/constipation and peripheral neuropathy. Patient given ways to manage these side effects and when to contact office. [address] Handout of medications provided to patient. Mr. Nissa Larkin verbalized understanding of the information presented and all of the patient's questions were answered. Eulice Ganser, VarshaD, BCPS, BCOP    For Pharmacy Admin Tracking Only     CPA in place:  Yes   Recommendation Provided To: Patient/Caregiver: 2 via In person   Intervention Detail: New Rx: 2, reason: Needs Additional Therapy     Intervention Accepted By: Patient/Caregiver: 2   Time Spent (min): 30

## 2022-05-31 NOTE — TELEPHONE ENCOUNTER
[employer] Palliative Medicine Office  Nursing Note  [phone]  Fax [phone]      Name:  Dat Scherer. YOB: 1945    Received outpatient Palliative Medicine referral from Sathish Bhagat NP to see patient for symptom management and supportive care. Chart  reviewed. Jeff Dickey Sr. is a 67 y.o. male with pancreatic cancer with mets to LN and peritoneal carcinomatosis. Patient's most recent office visit with Dr. Marylee Lower was on 5/25/22. See her note for complete HPI, treatment history, and plan. ACP:    No ACP documents on file    Yaron Grier RN from Dr. Nixon Matute office called requesting outpatient Palliative appointment for Mr. Nissa Larkin.   Soonest available appt scheduled with Salud Tapia MD, on 6/16/22 at 10:30am.    LIVAN Fontaine, RN-BC, [facility]

## 2022-05-31 NOTE — TELEPHONE ENCOUNTER
He can take two tablets of roxicodone for total of 10mg if uncontrolled with 5mg tablets. Referral placed to Dr. Percy Mike, Palliative Medicine, for pain management.  Leopoldo Drummer please see if we can expedite getting him into see PM.    AS NP

## 2022-05-31 NOTE — TELEPHONE ENCOUNTER
1430 Call to Ricky Roberts to check on patient related to unmanaged pain. Patient has been alternating Tylenol 650 mg q 4 hrs and Roxicodone 5 mg q4h consistently for approximately one week. Mr Janeth Bhandari stated his pain is intermittent 6 out of 10 and is located directly above the umbilicus with a visible mass that is new to him and his wife. Diarrhea has improved to now \"soft stool\" although wife feels patient may be stating that to stay out of the hospital.  Recommended brat diet small frequent meals and fluid replacement. Please Advise.

## 2022-05-31 NOTE — TELEPHONE ENCOUNTER
1549 Call to Mrs Brett Ndiaye to inform of order from Sofía Reich NP to increase Oxycodone to 10 mg q4hr as needed for pain. Mrs pink informed of referral to palliative medicine to see Dr Antonia Saucedo to assist with pain management scheduled for June 16th at 10:30am. Patient is on call list for possible cancellation appointment.

## 2022-05-31 NOTE — TELEPHONE ENCOUNTER
80 Mrs Dasia Zuniga called to ask if Mr Dasia Zuniga can have something for anxiety. Informed her I will attach to previous recent request for advice.

## 2022-06-01 NOTE — TELEPHONE ENCOUNTER
Patient's wife called about patient upcoming appt on June 16. She like for the appt to be virtual instead of in person .  Her .[phone]

## 2022-06-01 NOTE — TELEPHONE ENCOUNTER
Patient identified by caregiver Windy Mead. Informed Mrs Génesis Jara that a pain management/palliative referral has been made to help with pain and quality for her . As per Son Joya NP, pt can take 10mg Oxycodone every 4 hrs as needed for pain. Mrs Génesis Jara stated understanding. Thanked us for the calls.

## 2022-06-01 NOTE — TELEPHONE ENCOUNTER
Spoke with patients wife, advised that the initial evaluation needs to be held in person, and can be held virtually thereafter.  Wife verbalized understanding and will be present to upcoming new patient appt

## 2022-06-03 NOTE — ROUTINE PROCESS
Uneventful port insertion. Site shows no signs of bleeding or problems. Pt verbalized understanding of all discharge instructions and is at baseline following the conscious sedation. IV removed with hemostasis. VSS. Ready for discharge.

## 2022-06-03 NOTE — H&P
Radiology History and Physical    Patient: Cass Sanches 67 y.o. male       Chief Complaint: No chief complaint on file. History of Present Illness: Port placement for chemotherapy    History:    Past Medical History:   Diagnosis Date    Claudication ([facility]     Constipation 10/22/2012    Glaucoma     Hypercholesterolemia     Hypertension     Pancreatitis 10/2012    Prostate cancer ([facility]     s/p prostatectomy    Thrombocytopenia ([facility] 7/27/2010     Family History   Problem Relation Age of Onset    Cancer Mother     Hypertension Mother     Hypertension Brother     Heart Disease Father     Hypertension Brother      Social History     Socioeconomic History    Marital status:      Spouse name: Not on file    Number of children: Not on file    Years of education: Not on file    Highest education level: Not on file   Occupational History    Not on file   Tobacco Use    Smoking status: Passive Smoke Exposure - Never Smoker    Smokeless tobacco: Never Used   Substance and Sexual Activity    Alcohol use: No     Comment: ocassionally    Drug use: No     Types: OTC, Prescription    Sexual activity: Not on file   Other Topics Concern    Not on file   Social History Narrative    Retired [employer] . Social Determinants of Health     Financial Resource Strain:     Difficulty of Paying Living Expenses: Not on file   Food Insecurity:     Worried About Running Out of Food in the Last Year: Not on file    [place] of Food in the Last Year: Not on file   Transportation Needs:     Lack of Transportation (Medical): Not on file    Lack of Transportation (Non-Medical):  Not on file   Physical Activity:     Days of Exercise per Week: Not on file    Minutes of Exercise per Session: Not on file   Stress:     Feeling of Stress : Not on file   Social Connections:     Frequency of Communication with Friends and Family: Not on file    Frequency of Social Gatherings with Friends and Family: Not on file    Attends Nondenominational Services: Not on file    Active Member of Clubs or Organizations: Not on file    Attends Club or Organization Meetings: Not on file    Marital Status: Not on file   Intimate Partner Violence:     Fear of Current or Ex-Partner: Not on file    Emotionally Abused: Not on file    Physically Abused: Not on file    Sexually Abused: Not on file   Housing Stability:     Unable to Pay for Housing in the Last Year: Not on file    Number of [place] in the Last Year: Not on file    Unstable Housing in the Last Year: Not on file       Allergies: Allergies   Allergen Reactions    Shellfish Derived Swelling    Zoloft [Sertraline] Other (comments)     Insomnia       Current Medications:  Current Facility-Administered Medications   Medication Dose Route Frequency    lidocaine (XYLOCAINE) 20 mg/mL (2 %) injection 400 mg  20 mL SubCUTAneous RAD ONCE    lidocaine-EPINEPHrine (XYLOCAINE) 1 %-1:100,000 injection 300 mg  30 mL SubCUTAneous RAD ONCE    heparin (porcine) pf 500 Units  500 Units IntraVENous RAD ONCE        Physical Exam:  There were no vitals taken for this visit. GENERAL: alert, cooperative, no distress, appears stated age  LUNG: clear to auscultation bilaterally  HEART: regular rate and rhythm  ABD: Non tender, non distended. Alerts:    Hospital Problems  Date Reviewed: 5/25/2022    None          Laboratory:    No results for input(s): HGB, HCT, WBC, PLT, INR, BUN, CREA, K, CRCLT, HGBEXT, HCTEXT, PLTEXT, INREXT in the last 72 hours. No lab exists for component: PTT, PT      Plan of Care/Planned Procedure:  Risks, benefits, and alternatives reviewed with patient and he agrees to proceed with the procedure. Deemed appropriate for moderate sedation with versed and fentanyl.       Ashlyn Vargas MD

## 2022-06-03 NOTE — DISCHARGE INSTRUCTIONS
Patient Education        Implanted Port: What to Expect at [facility]. had a procedure to implant a port. A port is a device placed, in most cases, under the skin of your chest below your collarbone. It is made of plastic, stainless steel, or titanium. It's about the size of a quarter, but thicker. It looks like a small bump under your skin. A thin, flexible tube called a catheter runs under the skin from the port into a large vein. With the port, you will be able to get medicines (such as chemotherapy) with more comfort. You also can get blood, nutrients, or other fluids. Blood can be taken through the port for tests. You will probably have some discomfort and bruising at the port site. This will go away in a few days. The port can be used right away. You may have the port for weeks, months, or longer. Your port will need to be flushed out regularly to keep it open. A nurse or other health professional will do this for you. This care sheet gives you a general idea about how long it will take for you to recover. But each person recovers at a different pace. Follow the steps below to feel better as quickly as possible. How can you care for yourself at home? Activity    · Avoid arm and upper body movements that may pull on the catheter for the first few days. These movements include heavy weight lifting and vigorous use of your arms.     · You will probably need to take 1 day off from work and will be able to return to normal activities shortly after. This depends on the type of work you do, why you have the catheter, and how you feel.     · You probably will be able to take baths and swim. But you may need to avoid some activities. Talk to your doctor about any limits on your activity.     · Ask your doctor when you can drive again. Be careful when you pull your seat belt across your chest so it doesn't pull out the catheter. It's okay if the seat belt lays over the catheter.    Medicines    · Your doctor will tell you if and when you can restart your medicines. He or she will also give you instructions about taking any new medicines.     · If you take aspirin or some other blood thinner, ask your doctor if and when to start taking it again. Make sure that you understand exactly what your doctor wants you to do.     · Be safe with medicines. Read and follow all instructions on the label. ? If the doctor gave you a prescription medicine for pain, take it as prescribed. ? If you are not taking a prescription pain medicine, ask your doctor if you can take an over-the-counter medicine. Incision care    · If you have a bandage, your doctor will tell you when you can remove it. After you remove the bandage, you may shower. Wash the area with soap and water and pat it dry. Don't use hydrogen peroxide or alcohol, which can slow healing. You may cover the area with a gauze bandage if it weeps or rubs against clothing. Change the bandage every day.     · If you have strips of tape on the cut (incision) the doctor made, leave the tape on for a week or until it falls off. Other instructions    · Always carry the medical alert card that your doctor gives you. It contains information about your port. It will tell health care workers that you have a port in case you need emergency care.     · When you get dressed, be careful not to rub the port. Do not wear a bra or suspenders that irritate your skin near the port. Follow-up care is a key part of your treatment and safety. Be sure to make and go to all appointments, and call your doctor if you are having problems. It's also a good idea to know your test results and keep a list of the medicines you take. When should you call for help? Call your doctor now or seek immediate medical care if:    · You have signs of infection, such as:  ? Increased pain, swelling, warmth, or redness. ? Red streaks leading from the area. ? Pus draining from the area. ?  A fever.     · You have pain or swelling in your neck or arm.     · You have trouble breathing. Watch closely for changes in your health, and be sure to contact your doctor if:    · You have any problems with your line or port. Where can you learn more? Go to http://www.gray.com/  Enter M256 in the search box to learn more about \"Implanted Port: What to Expect at Home. \"  Current as of: July 1, 2021               Content Version: 13.2  © 2006-2022 [employer]. Care instructions adapted under license by [employer] (which disclaims liability or warranty for this information). If you have questions about a medical condition or this instruction, always ask your healthcare professional. [address] any warranty or liability for your use of this information. You have received sedation medications today. YOU SHOULD NOT DRIVE FOR 24 HOURS, DO NOT OPERATE HEAVY MACHINERY, DO NOT MAKE ANY LEGAL DECISIONS OR SIGN LEGAL DOCUMENTS FOR 24 HOURS. DO NOT DRINK ALCOHOL, TAKE ANY MEDICATIONS UNLESS PRESCRIBED BY YOUR DOCTOR. IF YOU ARE A CAREGIVER, SOMEONE SHOULD TAKE THAT ROLE FOR 24 HOURS. Side effects of sedation medications and other medications used today have been reviewed  Those side effects can include but are not limited to: dizziness, drowsiness, poor balance, fatigue, sleepiness. Take precautions at home to prevent falls, such as assistance with walking or stairs if allowed and /or when needed or position changes. Allergic or adverse reactions could include nausea, itching, hives, dizziness, or anything else out of the ordinary. Should you experience any of these significant changes, please call [phone] between the hours of 7:30 am and 3:30 pm or [phone] after hours.  After hours, ask the  to page the X-ray Technologist, and describe the problem to the technologist. If you are experiencing chest pain, shortness of breath, altered mental state, unusual bleeding or any other emergent symptom you should call 911 immediately.

## 2022-06-03 NOTE — PROGRESS NOTES
Pt arrives ambulatory to angio department accompanied by Rizwana Galeas, claudio, for insertion of port procedure. All assessments completed and consent was reviewed. Education given was regarding procedure, consicous sedation, post-procedure care and  management/follow-up. Opportunity for questions was provided and all questions and concerns were addressed.

## 2022-06-06 NOTE — PROGRESS NOTES
[facility] [place] [address] Alicia Henley, [id number] [place] [address] Road, [address] [place] [address]  W: [phone]  F: [phone]    Reason for Visit:   Charlee Bajwa is a 67 y.o. male who is seen for Pancreatic adenocarcinoma - stage IV     Treatment History:   · None from us yet    History of Present Illness:   Patient is a 67 y.o. male seen for above    Presented with 3 weeks of abdominal pain 4/19/2022. He was started on Cipro flagyl with some improvement but not complete resolution. He then had a CT scan which showed hypoechoic masses in the pancreas with infiltration of the celiac axis. He had an MRI abdomen on 4/28/2022 which showed a 2.8 x 3.9 x 2.7 cm mass and in the tail of the pancreas there is 2.3 x 3.6 x 2.3 cm mass abutting the celiac axis and potentially occluding left gastric artery and likely responsible for 5 mm dilation of pancreatic duct into the pancreatic tail. Also noted were 1.4 x 1.7 cm left periaortic lymph node and 1.1 x 1.6 cm right common iliac node concerning for metastatic neoplasm. EUS on 5/6/2022 showed  2 masses in the pancreas, 1 and the body of the pancreas and the second in the tail of the pancreas. Mass in the tail of the pancreas was heterogeneous, irregular and measured about 37 mm x 29 mm in size. The mass was abutting the splenic artery. Mass in the body of the pancreas measured about 36 mm x 32 mm in size. Mass was heterogeneous, irregular and was abutting the celiac axis. Biopsies from both revealed adenocarcinoma. He has a long standing history of ITP ( Work up included a BM bx in 2010). He has seen Dr. Terrance Wall for this previously. He has a h/o Jerri 3+3 prostate cancer s/p prostatectomy in 2005. Had a biochemical recurrence with PSA upto 1.2 by 2016 at which time he had biopsy proven local recurrence with Jerri 3+4 disease s/p radiation on 4/2016 followed by an undetectable PSA atleast until 2018. His repeat PSA 5/2022 was < 0.1.  PET scan showed carcinomatosis. Here to start palliative Gemzar and Abraxane. He had a lot of diarrhea and cramping after starting creon so he stopped it and this resolved. Pain controlled. He has been eating. Weight stable. His has had pain in the epigastric pain , no radiation, down to 5/10, was 9/10. Worse when he eats. Started creon yesterday. He has no constipation. Comes with son and wife- Jesica Yepez. Past Medical History:   Diagnosis Date    Claudication [facility])     Constipation 10/22/2012    Glaucoma     Hypercholesterolemia     Hypertension     Pancreatitis 10/2012    Prostate cancer ([facility] [address]     s/p prostatectomy    Thrombocytopenia ([facility] [address] 7/27/2010      Past Surgical History:   Procedure Laterality Date    ENDOSCOPY, COLON, DIAGNOSTIC  1999    Normal; Dr. Karyna Brewer 44  04/2017    HX ORTHOPAEDIC      right wrist surgery    HX OTHER SURGICAL      torn cartridge in L knee    HX PROSTATECTOMY  2005    IR INSERT TUNL CVC W PORT OVER 5 YEARS  6/3/2022      Social History     Tobacco Use    Smoking status: Passive Smoke Exposure - Never Smoker    Smokeless tobacco: Never Used   Substance Use Topics    Alcohol use: No     Comment: ocassionally      Family History   Problem Relation Age of Onset    Cancer Mother     Hypertension Mother     Hypertension Brother     Heart Disease Father     Hypertension Brother      Current Outpatient Medications   Medication Sig    ondansetron (ZOFRAN ODT) 8 mg disintegrating tablet Take 1 Tablet by mouth every eight (8) hours as needed for Nausea or Vomiting. (Patient not taking: Reported on 6/3/2022)    lidocaine-prilocaine (EMLA) topical cream Apply  to affected area as needed for Pain or PRN Reason (Other) (port access).  Apply a thin layer to port site 30-60 minutes prior to arrival for chemotherapy treatments (Patient not taking: Reported on 6/3/2022)    oxyCODONE IR (ROXICODONE) 5 mg immediate release tablet Take 1 Tablet by mouth every four (4) hours as needed for Pain for up to 14 days. Max Daily Amount: 30 mg.    lipase-protease-amylase (Creon) 36,000-114,000- 180,000 unit cpDR capsule Take 8 Capsules by mouth daily as needed (based on size meals and snacks). Take 2 caps with first bite of full meals and 1 with first bite of snacks. (Patient not taking: Reported on 6/3/2022)    lisinopril-hydroCHLOROthiazide (PRINZIDE, ZESTORETIC) 20-25 mg per tablet TAKE 1 TABLET TWICE DAILY    atorvastatin (LIPITOR) 40 mg tablet TAKE 1 TABLET EVERY NIGHT    verapamil ER (CALAN-SR) 240 mg CR tablet TAKE 1 TABLET EVERY DAY    PSYLLIUM SEED, WITH SUGAR, (METAMUCIL, SUGAR, PO) Take  by mouth.  senna (SENNA) 8.6 mg tablet Take 1 Tab by mouth daily. (Patient not taking: Reported on 6/3/2022)    MULTIVITAMINS (MULTIVITAMIN PO) Take  by mouth. No current facility-administered medications for this visit. Allergies   Allergen Reactions    Shellfish Derived Swelling    Zoloft [Sertraline] Other (comments)     Insomnia        Review of Systems: A complete review of systems was obtained, negative except as described above. Physical Exam:     There were no vitals taken for this visit. ECOG PS: 1  General: No distress  Eyes: PERRLA, anicteric sclerae  HENT: Atraumatic  Neck: Supple  Lymphatic: No cervical, supraclavicular, or inguinal adenopathy  Respiratory: normal respiratory effort  CV: Normal rate, no peripheral edema  GI: nondistended  Skin: No rashes, ecchymoses, or petechiae. Normal temperature, turgor, and texture. Psych: Alert, oriented, appropriate affect, normal judgment/insight    Results:     Lab Results   Component Value Date/Time    WBC 6.8 05/16/2022 09:06 AM    HGB 14.0 05/16/2022 09:06 AM    HCT 40.4 05/16/2022 09:06 AM    PLATELET 501 (L) 02/88/8404 09:06 AM    MCV 90 05/16/2022 09:06 AM    ABS.  NEUTROPHILS 3.7 05/16/2022 09:06 AM     Lab Results   Component Value Date/Time    Sodium 137 05/16/2022 09:06 AM    Potassium 4.2 05/16/2022 09:06 AM    Chloride 95 (L) 05/16/2022 09:06 AM    CO2 21 05/16/2022 09:06 AM    Glucose 143 (H) 05/16/2022 09:06 AM    BUN 22 05/16/2022 09:06 AM    Creatinine 1.28 (H) 05/16/2022 09:06 AM    GFR est AA 57 (L) 08/12/2017 08:46 AM    GFR est non-AA 49 (L) 08/12/2017 08:46 AM    Calcium 10.3 (H) 05/16/2022 09:06 AM    Glucose (POC) 141 (H) 05/19/2022 10:45 AM     Lab Results   Component Value Date/Time    Bilirubin, total 0.7 05/16/2022 09:06 AM    ALT (SGPT) 19 05/16/2022 09:06 AM    Alk. phosphatase 62 05/16/2022 09:06 AM    Protein, total 7.0 05/16/2022 09:06 AM    Albumin 4.7 05/16/2022 09:06 AM    Globulin 3.1 10/03/2012 04:53 AM       Lab Results   Component Value Date/Time    Lipase 53 12/03/2016 08:52 AM       Lab Results   Component Value Date/Time    INR 1.0 04/05/2010 08:43 AM    aPTT 29.3 04/05/2010 08:43 AM     Prostate Specific Ag   Date Value   05/16/2022 <0.1 ng/mL   04/04/2017 <0.1 ng/mL   02/23/2012 0.7 ng/mL   10/13/2009 0.4 NG/ML     CA 19-9:  Recent Labs     05/16/22  0906   C199LT <2       Records reviewed and summarized above. Pathology report(s) reviewed above. Radiology report(s) reviewed above. 1. There are 2 pancreatic mass lesions suspicious for neoplasm with features  suggestive of adenocarcinoma with lesion in the pancreatic body abutting the  celiac axis and potentially occluding left gastric artery and likely responsible  for 5 mm dilation of pancreatic duct into the pancreatic tail. 2. 1.4 x 1.7 cm left periaortic lymph node and 1.1 x 1.6 cm right common iliac  node concerning for metastatic neoplasm, possibly prostatic in this patient with  history of prostate cancer and prostatectomy. 3. No MRI evidence of other metastatic disease    PET CT 5/19/2022    IMPRESSION  1. RML lesion, indeterminate. 2. LLL punctate nodule, indeterminate. 3. Two pancreatic masses. 4. Two abdominal midline wall tumor deposits. 5. Metastatic retroperitoneal nodes.   6. Peritoneal tumor implants. Assessment:   1) Pancreatic masses-Adenocarcinoma- stage IV     Presented to with epigastric pain. Noted to have 2 adjustment masses in the pancreatic body and tail measuring about 3.6 to 3.9 cm. Mass is about the celiac plexus and splenic artery. Scan showed a 1.4 x 1.7 cm left periaortic lymph node as well as a 1.1 x 1.6 cm right common iliac node. PET CT reviewed and noted tumor deposits, metastatic RP nodes and peritoneal carcinomatosis    Consistent with pancreatic cancer, stage IV  Reviewed in tumor board. Discussed scans and path. Discussed that management is palliative. He is noted to have a long history of thrombocytopenia which may limit doses  Discussed palliative Gemzar+ Abraxane . Discussed the role of genetic and NGS testing    Here to start cycle 1 of Gemzar and abraxane. Counseled on side effects again    2) history of prostate cancer  [place] 3+3 status post prostatectomy 2005  Recurrent disease Jerri 3+4 in 2016 status postradiation with undetectable PSA    3) Epigastric pain  Well controlled on Oxycodone  He did not tolerate creon as he had a lot of cramping and diarrhea  Will hold off on resuming unless he starts loosing weight    4) thrombocytopenia  External [facility] were reviewed. It is noted that he had an extensive work-up including a bone marrow biopsy that showed normal number of megakaryocytes. Presumed to be ITP. Counts have been around 60,000 and has not required treatment in the past.      5) Encounter for high risk disease disease       Plan:     · Proceed with Gemzar and Abraxane given every 2 weeks until progression ( 20% dose reduced due to frailty and thrombocytopenia)   · Genetic testing sent and pending  · Antiemetics per protocol  · Oxycodone prn pain.  OK to alternate with Tylenol  · Port  · Referred to palliative care next visit  · Creon - HOLD   · RD to follow     RTC with each cycle     I appreciate the opportunity to participate in . Galina Mckenna 89 Sr.'s care.     Signed By: Tasia Perez MD

## 2022-06-07 PROBLEM — D69.3 CHRONIC ITP (IDIOPATHIC THROMBOCYTOPENIA) (HCC): Status: ACTIVE | Noted: 2022-01-01

## 2022-06-07 NOTE — PROGRESS NOTES
Pharmacy Note- Chemotherapy Education     Fide Cope Sr. is a  67 y. o.male  diagnosed with pancreatic cancer here today for Cycle 1, Day 1  chemotherapy counseling. Mr. Rita Vazquez is being treated with gemcitabine albumin bound PACLItaxel. Provided education on gemcitabine albumin bound PACLItaxel and premedications - ondansetron and dexamethasone.     Provided handout and reviewed home supportive care regimen.      Reviwed side effects of chemotherapy to  include s/s infection, anemia, appetite changes, thrombocytopenia, fatigue, hair loss/alopecia, bone pain, skin and nail changes, diarrhea/constipation and peripheral neuropathy.     Patient given ways to manage these side effects and when to contact office.      Mr. Sandhu verbalized understanding of the information presented and all of the patient's questions were answered.     Willem Hagen, PharmD, [facility], [address] [place] [address] in place:  Yes   Recommendation Provided To: Patient/Caregiver: 1 via In person      Intervention Accepted By: Patient/Caregiver: 1   Time Spent (min): 15

## 2022-06-07 NOTE — PROGRESS NOTES
[facility] Chemo Progress Note    6534 Mr. Sandhu Arrived to [facility] for Abraxane/Gemzar (C1D1) ambulatory in stable condition. Assessment and port access completed by Iglesia Triana RN. Labs collected and sent for processing. Chemotherapy Flowsheet 6/7/2022   Cycle C1D1   Date 6/7/2022   Drug / Regimen Abraxane/ Gemzar   Pre Meds given   Notes given       Mr. Sandhu's vitals were reviewed. Patient Vitals for the past 12 hrs:   Temp Pulse Resp BP   06/07/22 1659 -- 88 -- (!) 164/80   06/07/22 1305 97.3 °F (36.3 °C) (!) 105 18 126/76       Lab results were obtained and reviewed. Recent Results (from the past 12 hour(s))   CBC WITH AUTOMATED DIFF    Collection Time: 06/07/22  1:09 PM   Result Value Ref Range    WBC 7.6 4.1 - 11.1 K/uL    RBC 4.35 4.10 - 5.70 M/uL    HGB 13.5 12.1 - 17.0 g/dL    HCT 39.3 36.6 - 50.3 %    MCV 90.3 80.0 - 99.0 FL    MCH 31.0 26.0 - 34.0 PG    MCHC 34.4 30.0 - 36.5 g/dL    RDW 13.8 11.5 - 14.5 %    PLATELET 118 (L) 240 - 400 K/uL    MPV 12.7 8.9 - 12.9 FL    NRBC 0.0 0  WBC    ABSOLUTE NRBC 0.00 0.00 - 0.01 K/uL    NEUTROPHILS 73 32 - 75 %    LYMPHOCYTES 16 12 - 49 %    MONOCYTES 9 5 - 13 %    EOSINOPHILS 1 0 - 7 %    BASOPHILS 1 0 - 1 %    IMMATURE GRANULOCYTES 0 0.0 - 0.5 %    ABS. NEUTROPHILS 5.5 1.8 - 8.0 K/UL    ABS. LYMPHOCYTES 1.3 0.8 - 3.5 K/UL    ABS. MONOCYTES 0.7 0.0 - 1.0 K/UL    ABS. EOSINOPHILS 0.1 0.0 - 0.4 K/UL    ABS. BASOPHILS 0.1 0.0 - 0.1 K/UL    ABS. IMM.  GRANS. 0.0 0.00 - 0.04 K/UL    DF AUTOMATED     METABOLIC PANEL, COMPREHENSIVE    Collection Time: 06/07/22  1:09 PM   Result Value Ref Range    Sodium 129 (L) 136 - 145 mmol/L    Potassium 3.8 3.5 - 5.1 mmol/L    Chloride 96 (L) 97 - 108 mmol/L    CO2 26 21 - 32 mmol/L    Anion gap 7 5 - 15 mmol/L    Glucose 132 (H) 65 - 100 mg/dL    BUN 18 6 - 20 MG/DL    Creatinine 1.14 0.70 - 1.30 MG/DL    BUN/Creatinine ratio 16 12 - 20      GFR est AA >60 >60 ml/min/1.73m2    GFR est non-AA >60 >60 ml/min/1.73m2 Calcium 9.9 8.5 - 10.1 MG/DL    Bilirubin, total 0.4 0.2 - 1.0 MG/DL    ALT (SGPT) 25 12 - 78 U/L    AST (SGOT) 10 (L) 15 - 37 U/L    Alk. phosphatase 70 45 - 117 U/L    Protein, total 7.1 6.4 - 8.2 g/dL    Albumin 3.6 3.5 - 5.0 g/dL    Globulin 3.5 2.0 - 4.0 g/dL    A-G Ratio 1.0 (L) 1.1 - 2.2     HEP B SURFACE AG    Collection Time: 06/07/22  1:09 PM   Result Value Ref Range    Hepatitis B surface Ag <0.10 Index    Hep B surface Ag Interp. Negative NEG     HEP B SURFACE AB    Collection Time: 06/07/22  1:09 PM   Result Value Ref Range    Hepatitis B surface Ab 3.34 mIU/mL    Hep B surface Ab Interp. NONREACTIVE NR         Pre-medications  were administered as ordered and chemotherapy was initiated. Medications Administered     0.9% sodium chloride infusion 1,000 mL     Admin Date  06/07/2022 Action  New Bag Dose  1,000 mL Route  IntraVENous Administered By  Lorin Taylor RN          dexamethasone (PF) (DECADRON) 10 mg/mL injection 10 mg     Admin Date  06/07/2022 Action  Given Dose  10 mg Route  IntraVENous Administered By  Lorin Taylor RN          gemcitabine (GEMZAR) 1,536 mg in 0.9% sodium chloride 250 mL, overfill volume 25 mL chemo infusion     Admin Date  06/07/2022 Action  New Bag Dose  1,536 mg Rate  630.8 mL/hr Route  IntraVENous Administered By  Lorin Taylor RN          [facility]ron [facility] injection 8 mg     Admin Date  06/07/2022 Action  Given Dose  8 mg Route  IntraVENous Administered By  Lorin Taylor RN          PACLitaxeL protein-bound (ABRAXANE) 192 mg in 0.9% sodium chloride 38.4 mL chemo infusion     Admin Date  06/07/2022 Action  New Bag Dose  192 mg Rate  76.8 mL/hr Route  IntraVENous Administered By  Lorin Taylor RN          sodium chloride (NS) flush 10 mL     Admin Date  06/07/2022 Action  Given Dose  10 mL Route  IntraVENous Administered By  Markell Zimmer            1700 Patient tolerated treatment well. Port maintained positive blood return throughout treatment.  Port flushed, heparinized and de accessed per protocol. Patient was discharged from [facility] in stable condition. Patient aware of next appointment.      Future Appointments   Date Time Provider Isauro Arenas   6/16/2022 10:30 AM Salud Luong MD [facility] BS AMB   6/21/2022  9:00 AM D3 [facility] [place] [facility]   6/21/2022  9:15 AM Shaye Altamirano  [address] BS AMB   7/5/2022  9:00 AM D3 [facility] [facility]   7/19/2022  9:00 AM G3 [facility] [place] [facility]   8/2/2022  9:00 AM G3 [facility] [place] [facility]   8/16/2022  9:00 AM G3 [facility] [place] [facility]   8/30/2022  9:00 AM G3 [facility] [place] [facility]   9/13/2022  9:00 AM G3 [facility] [place] [facility]   9/27/2022  9:00 AM G3 [facility] Isauro [facility] FERNANDA Valadez RN  June 7, 2022

## 2022-06-07 NOTE — PROGRESS NOTES
Gabriela Mcgill. is a 67 y.o. male  Chief Complaint   Patient presents with    Follow-up     Pancreatic adenocarcinoma      1. Have you been to the ER, urgent care clinic since your last visit? Hospitalized since your last visit? No    2. Have you seen or consulted any other health care providers outside of the [address] [place] [address] since your last visit? Include any pap smears or colon screening.  No

## 2022-06-13 NOTE — TELEPHONE ENCOUNTER
The patient's wife Meño Hines called to advise he wants to cancel the NP appointment w/ Dr. Kyree Loomis on 6/16/22 @ 10:30. No reason was given. They will reach back out to reschedule.

## 2022-06-13 NOTE — TELEPHONE ENCOUNTER
Message noted that patient will reschedule appt with Dr. Mathew Marina.      Elena Meadows RN  Palliative Medicine

## 2022-06-15 NOTE — TELEPHONE ENCOUNTER
Patient's wife called and would like a call back from the nurse. Caller is worried because the patient is extremely tired/Sleeping a lot, cold all the time, and has no appetite. Please call the patient back at:  [phone].      Patient next OV is 6/21/22 @ [facility]

## 2022-06-16 NOTE — PROGRESS NOTES
[address] [facility] @ [facility] VISIT NOTE    6054 Patient arrives for IV Hydration & Labs without acute problems. Please see connect care for complete assessment and education provided. Vital signs stable throughout and prior to discharge, Pt. Tolerated treatment well and discharged without incident. Patient is aware of next [facility] appointment on 6/21/2022. Appointment card given to patient. Medications Verified by  Emiliano Doss RN:  1.   NS 1L over 1 hour  2. NS flush  3. Heparin flush    VITAL SIGNS Patient Vitals for the past 12 hrs:   Temp Pulse BP SpO2   06/16/22 1505 -- 87 117/75 --   06/16/22 1349 97.8 °F (36.6 °C) (!) 107 116/75 98 %       LAB WORK Lab results pending, please see Connect Care for results. Recent Results (from the past 12 hour(s))   CBC WITH AUTOMATED DIFF    Collection Time: 06/16/22  2:00 PM   Result Value Ref Range    WBC 5.5 4.1 - 11.1 K/uL    RBC 4.26 4.10 - 5.70 M/uL    HGB 12.9 12.1 - 17.0 g/dL    HCT 37.5 36.6 - 50.3 %    MCV 88.0 80.0 - 99.0 FL    MCH 30.3 26.0 - 34.0 PG    MCHC 34.4 30.0 - 36.5 g/dL    RDW 13.7 11.5 - 14.5 %    PLATELET 93 (L) 044 - 400 K/uL    MPV 12.3 8.9 - 12.9 FL    NRBC 0.0 0  WBC    ABSOLUTE NRBC 0.00 0.00 - 0.01 K/uL    NEUTROPHILS 61 32 - 75 %    LYMPHOCYTES 25 12 - 49 %    MONOCYTES 11 5 - 13 %    EOSINOPHILS 1 0 - 7 %    BASOPHILS 1 0 - 1 %    IMMATURE GRANULOCYTES 1 (H) 0.0 - 0.5 %    ABS. NEUTROPHILS 3.2 1.8 - 8.0 K/UL    ABS. LYMPHOCYTES 1.4 0.8 - 3.5 K/UL    ABS. MONOCYTES 0.6 0.0 - 1.0 K/UL    ABS. EOSINOPHILS 0.1 0.0 - 0.4 K/UL    ABS. BASOPHILS 0.1 0.0 - 0.1 K/UL    ABS. IMM.  GRANS. 0.1 (H) 0.00 - 0.04 K/UL    DF SMEAR SCANNED      RBC COMMENTS NORMOCYTIC, NORMOCHROMIC     METABOLIC PANEL, COMPREHENSIVE    Collection Time: 06/16/22  2:00 PM   Result Value Ref Range    Sodium 132 (L) 136 - 145 mmol/L    Potassium 3.6 3.5 - 5.1 mmol/L    Chloride 97 97 - 108 mmol/L    CO2 27 21 - 32 mmol/L    Anion gap 8 5 - 15 mmol/L    Glucose 156 (H) 65 - 100 mg/dL    BUN 25 (H) 6 - 20 MG/DL    Creatinine 1.18 0.70 - 1.30 MG/DL    BUN/Creatinine ratio 21 (H) 12 - 20      GFR est AA >60 >60 ml/min/1.73m2    GFR est non-AA 60 (L) >60 ml/min/1.73m2    Calcium 9.4 8.5 - 10.1 MG/DL    Bilirubin, total 0.5 0.2 - 1.0 MG/DL    ALT (SGPT) 45 12 - 78 U/L    AST (SGOT) 12 (L) 15 - 37 U/L    Alk.  phosphatase 76 45 - 117 U/L    Protein, total 6.7 6.4 - 8.2 g/dL    Albumin 3.6 3.5 - 5.0 g/dL    Globulin 3.1 2.0 - 4.0 g/dL    A-G Ratio 1.2 1.1 - 2.2     MAGNESIUM    Collection Time: 06/16/22  2:00 PM   Result Value Ref Range    Magnesium 1.9 1.6 - 2.4 mg/dL

## 2022-06-16 NOTE — TELEPHONE ENCOUNTER
0813:  Returned call to [facility] confirmed   Reporting the following on the patient:  Very fatigued   Sleeping more often   Decreased appetite; last night ate a very small amount of chicken with red beans and rice   Wife is trying to encourage hydration but patient has decreased intake   Patient is constantly cold     Informed would notify MD Albright and would call back   Wife verbalized understanding     1003:  Called patient's spouse and PHI confirmed   Office recommended IV hydration and labs to see if anything needs to be replenished   Wife agreeable with this   PEDS OPI 6/16/22 at 2 pm

## 2022-06-21 NOTE — PROGRESS NOTES
[facility] Progress Note    Date: 2022    Name: Desirae Brandon Sr. MRN: [id number]         : 1945    Mr. Keisha Vargas Arrived ambulatory and in no distress for cycle 1 day 15 of Gemzar/Abraxane regimen. Follow Up: Proceed with treatment    Assessment was completed and documented in flowsheets. No acute concerns at this time. Port accessed without difficulty, labs drawn and processed. Chemotherapy Flowsheet 2022   Cycle C1D15   Date 2022   Drug / Regimen Gemzar/Abraxane   Pre Meds given   Notes given         Mr. Sandhu's vitals were reviewed. Patient Vitals for the past 12 hrs:   Temp Pulse Resp BP SpO2   22 1226 -- 90 -- 125/78 --   22 0908 98.7 °F (37.1 °C) (!) 111 18 118/68 95 %       Lines:   Venous Access Device Mediport Vaccess CT lot [id number]  exp 2023 Upper chest (subclavicular area, right (Active)   Central Line Being Utilized Yes 22 0911   Criteria for Appropriate Use Irritant/vesicant medication 22 0911   Site Assessment Clean, dry, & intact 22 0911   Date of Last Dressing Change 22 0911   Dressing Status New 22 0911   Dressing Type Transparent 22 0911   Action Taken Blood drawn 22 0911   Date Accessed (Medial Site) 22 0911   Access Time (Medial Site) 1510 22 1510   Access Needle Size (Site #1) 20 G 22 0911   Access Needle Length (Medial Site) 1 inch 22 0911   Positive Blood Return (Medial Site) Yes 22 0911   Action Taken (Medial Site) Blood drawn;Flushed 22 0911   Positive Blood Return (Lateral Site) Yes 22 1227   Action Taken (Lateral Site) Flushed; De-accessed 22 1227   Alcohol Cap Used Yes 22 0911        Lab results were obtained and reviewed. Labs within parameter for treatment.    Recent Results (from the past 12 hour(s))   CBC WITH AUTOMATED DIFF    Collection Time: 22  9:11 AM   Result Value Ref Range    WBC 6.6 4.1 - 11.1 K/uL RBC 4.09 (L) 4.10 - 5.70 M/uL    HGB 12.6 12.1 - 17.0 g/dL    HCT 36.1 (L) 36.6 - 50.3 %    MCV 88.3 80.0 - 99.0 FL    MCH 30.8 26.0 - 34.0 PG    MCHC 34.9 30.0 - 36.5 g/dL    RDW 14.3 11.5 - 14.5 %    PLATELET 047 943 - 125 K/uL    MPV 11.2 8.9 - 12.9 FL    NRBC 0.0 0  WBC    ABSOLUTE NRBC 0.00 0.00 - 0.01 K/uL    NEUTROPHILS 64 32 - 75 %    LYMPHOCYTES 24 12 - 49 %    MONOCYTES 9 5 - 13 %    EOSINOPHILS 1 0 - 7 %    BASOPHILS 1 0 - 1 %    IMMATURE GRANULOCYTES 1 (H) 0.0 - 0.5 %    ABS. NEUTROPHILS 4.3 1.8 - 8.0 K/UL    ABS. LYMPHOCYTES 1.5 0.8 - 3.5 K/UL    ABS. MONOCYTES 0.6 0.0 - 1.0 K/UL    ABS. EOSINOPHILS 0.1 0.0 - 0.4 K/UL    ABS. BASOPHILS 0.1 0.0 - 0.1 K/UL    ABS. IMM. GRANS. 0.0 0.00 - 0.04 K/UL    DF AUTOMATED     METABOLIC PANEL, COMPREHENSIVE    Collection Time: 06/21/22  9:11 AM   Result Value Ref Range    Sodium 132 (L) 136 - 145 mmol/L    Potassium 3.6 3.5 - 5.1 mmol/L    Chloride 97 97 - 108 mmol/L    CO2 25 21 - 32 mmol/L    Anion gap 10 5 - 15 mmol/L    Glucose 180 (H) 65 - 100 mg/dL    BUN 21 (H) 6 - 20 MG/DL    Creatinine 1.25 0.70 - 1.30 MG/DL    BUN/Creatinine ratio 17 12 - 20      GFR est AA >60 >60 ml/min/1.73m2    GFR est non-AA 56 (L) >60 ml/min/1.73m2    Calcium 9.4 8.5 - 10.1 MG/DL    Bilirubin, total 0.5 0.2 - 1.0 MG/DL    ALT (SGPT) 32 12 - 78 U/L    AST (SGOT) 7 (L) 15 - 37 U/L    Alk. phosphatase 66 45 - 117 U/L    Protein, total 6.4 6.4 - 8.2 g/dL    Albumin 3.5 3.5 - 5.0 g/dL    Globulin 2.9 2.0 - 4.0 g/dL    A-G Ratio 1.2 1.1 - 2.2         Pre-medications  were administered as ordered and chemotherapy was initiated.   Medications Administered     0.9% sodium chloride infusion     Admin Date  06/21/2022 Action  New Bag Dose  25 mL/hr Rate  25 mL/hr Route  IntraVENous Administered By  Telma Brice RN          0.9% sodium chloride injection 10 mL     Admin Date  06/21/2022 Action  Given Dose  10 mL Route  IntraVENous Administered By  Rayray Barker, RICH           Admin Date  06/21/2022 Action  Given Dose  10 mL Route  IntraVENous Administered By  Marquise Mcdaniel RN          dexamethasone (PF) (DECADRON) 10 mg/mL injection 10 mg     Admin Date  06/21/2022 Action  Given Dose  10 mg Route  IntraVENous Administered By  Marquise Mcdaniel RN          gemcitabine (GEMZAR) 1,536 mg in 0.9% sodium chloride 250 mL, overfill volume 25 mL chemo infusion     Admin Date  06/21/2022 Action  New Bag Dose  1,536 mg Rate  630.8 mL/hr Route  IntraVENous Administered By  Marquise Mcdaniel RN          heparin (porcine) pf 300-500 Units     Admin Date  06/21/2022 Action  Given Dose  500 Units Route  InterCATHeter Administered By  Marquise Mcdaniel RN          ondansetron [facility]) injection 8 mg     Admin Date  06/21/2022 Action  Given Dose  8 mg Route  IntraVENous Administered By  Marquise Mcdaniel RN          PACLitaxeL protein-bound (ABRAXANE) 192 mg in 0.9% sodium chloride 38.4 mL chemo infusion     Admin Date  06/21/2022 Action  New Bag Dose  192 mg Rate  76.8 mL/hr Route  IntraVENous Administered By  Marquise Mcdaniel RN          sodium chloride (NS) flush 10 mL     Admin Date  06/21/2022 Action  Given Dose  10 mL Route  IntraVENous Administered By  Cruzito Alonzo RN                Medication education and side effect management reinforced with patient. They verbalized understanding. Mr. Coreen Koyanagi tolerated treatment well, port flushed and de accessed, patient was discharged from [place] [address] in stable condition. Patient is aware of upcoming appointments.       Future Appointments   Date Time Provider Isauro Arenas   7/5/2022 11:00 AM E4 [facility] [place] [facility]   7/18/2022  4:00 PM H3 [facility]   7/19/2022  9:00 AM G3 [facility]   7/19/2022  9:15 AM Zayda Albright  [address] BS AMB   8/1/2022  4:00 PM H3 [facility]   8/2/2022  9:00 AM G3 [facility] [place] [facility]   8/15/2022  4:00 PM H3 [facility]   8/16/2022 9:00 AM G3 [facility] [address] [facility]   8/29/2022  4:00 PM H3 [facility]   8/30/2022  9:00 AM G3 [facility] [place] [facility]   9/12/2022  4:00 PM H3 [facility]   9/13/2022  9:00 AM G3 [facility] [place] [facility]   9/27/2022  9:00 AM G3 [facility] [address] [facility]         Vito Banks RN  June 21, 2022

## 2022-06-21 NOTE — PROGRESS NOTES
Royasyl George. is a 67 y.o. male    Chief Complaint   Patient presents with    Follow-up     Pancreatic adenocarcinoma - stage IV        1. Have you been to the ER, urgent care clinic since your last visit? Hospitalized since your last visit? No    2. Have you seen or consulted any other health care providers outside of the [address] [place] [address] since your last visit? Include any pap smears or colon screening.  No

## 2022-06-21 NOTE — PROGRESS NOTES
[facility] at [address], [address], [address]  W: [phone]  F: [phone]    Reason for Visit:   Cass Sanches is a 67 y.o. male who is seen for Pancreatic adenocarcinoma - stage IV     Treatment History:   · 6/7/22: palliative gemcitabine/abraxane    History of Present Illness:   Patient is a 67 y.o. male seen for above    Presented with 3 weeks of abdominal pain 4/19/2022. He was started on Cipro flagyl with some improvement but not complete resolution. He then had a CT scan which showed hypoechoic masses in the pancreas with infiltration of the celiac axis. He had an MRI abdomen on 4/28/2022 which showed a 2.8 x 3.9 x 2.7 cm mass and in the tail of the pancreas there is 2.3 x 3.6 x 2.3 cm mass abutting the celiac axis and potentially occluding left gastric artery and likely responsible for 5 mm dilation of pancreatic duct into the pancreatic tail. Also noted were 1.4 x 1.7 cm left periaortic lymph node and 1.1 x 1.6 cm right common iliac node concerning for metastatic neoplasm. EUS on 5/6/2022 showed  2 masses in the pancreas, 1 and the body of the pancreas and the second in the tail of the pancreas. Mass in the tail of the pancreas was heterogeneous, irregular and measured about 37 mm x 29 mm in size. The mass was abutting the splenic artery. Mass in the body of the pancreas measured about 36 mm x 32 mm in size. Mass was heterogeneous, irregular and was abutting the celiac axis. Biopsies from both revealed adenocarcinoma. He has a long standing history of ITP ( Work up included a BM bx in 2010). He has seen Dr. Bing Ware for this previously. He has a h/o [place] 3+3 prostate cancer s/p prostatectomy in 2005. Had a biochemical recurrence with PSA upto 1.2 by 2016 at which time he had biopsy proven local recurrence with [place] 3+4 disease s/p radiation on 4/2016 followed by an undetectable PSA atleast until 2018. His repeat PSA 5/2022 was < 0.1.  PET scan showed carcinomatosis. Here to start palliative Gemzar and Abraxane. I see . Lucia Red in follow up on C1D15 gemzar/abraxane. He reports fatigue after first cycle. No nausea or vomiting. He reports diarrhea after treatment, 1-2 loose stools per day. He did not need to try imodium. Abdominal pain persists, may be slightly improved. He is alternating oxycodone with tylenol. No numbness, tingling. No mouth sores. No fevers, chills. Past Medical History:   Diagnosis Date    Claudication [facility])     Constipation 10/22/2012    Glaucoma     Hypercholesterolemia     Hypertension     Pancreatitis 10/2012    Prostate cancer ([facility] [address]     s/p prostatectomy    Thrombocytopenia ([facility] [address] 7/27/2010      Past Surgical History:   Procedure Laterality Date    ENDOSCOPY, COLON, DIAGNOSTIC  1999    Normal; Dr. Maxx Brewer 44  04/2017    HX ORTHOPAEDIC      right wrist surgery    HX OTHER SURGICAL      torn cartridge in L knee    HX PROSTATECTOMY  2005    IR INSERT TUNL CVC W PORT OVER 5 YEARS  6/3/2022      Social History     Tobacco Use    Smoking status: Passive Smoke Exposure - Never Smoker    Smokeless tobacco: Never Used   Substance Use Topics    Alcohol use: No     Comment: ocassionally      Family History   Problem Relation Age of Onset    Cancer Mother     Hypertension Mother     Hypertension Brother     Heart Disease Father     Hypertension Brother      Current Outpatient Medications   Medication Sig    oxyCODONE IR (ROXICODONE) 5 mg immediate release tablet Take 1 Tablet by mouth every four (4) hours as needed for Pain for up to 14 days. Max Daily Amount: 30 mg.    ondansetron (ZOFRAN ODT) 8 mg disintegrating tablet Take 1 Tablet by mouth every eight (8) hours as needed for Nausea or Vomiting.  lidocaine-prilocaine (EMLA) topical cream Apply  to affected area as needed for Pain or PRN Reason (Other) (port access).  Apply a thin layer to port site 30-60 minutes prior to arrival for chemotherapy treatments    lipase-protease-amylase (Creon) 36,000-114,000- 180,000 unit cpDR capsule Take 8 Capsules by mouth daily as needed (based on size meals and snacks). Take 2 caps with first bite of full meals and 1 with first bite of snacks.  lisinopril-hydroCHLOROthiazide (PRINZIDE, ZESTORETIC) 20-25 mg per tablet TAKE 1 TABLET TWICE DAILY    atorvastatin (LIPITOR) 40 mg tablet TAKE 1 TABLET EVERY NIGHT    verapamil ER (CALAN-SR) 240 mg CR tablet TAKE 1 TABLET EVERY DAY    PSYLLIUM SEED, WITH SUGAR, (METAMUCIL, SUGAR, PO) Take  by mouth.  senna (SENNA) 8.6 mg tablet Take 1 Tab by mouth daily.  MULTIVITAMINS (MULTIVITAMIN PO) Take  by mouth. No current facility-administered medications for this visit. Facility-Administered Medications Ordered in Other Visits   Medication Dose Route Frequency    sodium chloride (NS) flush 10 mL  10 mL IntraVENous PRN    0.9% sodium chloride injection 10 mL  10 mL IntraVENous PRN    heparin (porcine) pf 300-500 Units  300-500 Units InterCATHeter PRN      Allergies   Allergen Reactions    Shellfish Derived Swelling    Zoloft [Sertraline] Other (comments)     Insomnia        Review of Systems: A complete review of systems was obtained, negative except as described above. Physical Exam:     Visit Vitals  /68 (BP 1 Location: Left upper arm, BP Patient Position: Sitting)   Pulse (!) 111   Temp 98.7 °F (37.1 °C)   Resp 18   Wt 154 lb (69.9 kg)   SpO2 95%   BMI 22.10 kg/m²     ECOG PS: 1  General: No distress  Eyes: anicteric sclerae  HENT: Atraumatic  Neck: Supple  Respiratory: normal respiratory effort  GI: nondistended  Skin: No rashes, ecchymoses, or petechiae. Normal temperature, turgor, and texture.   Psych: Alert, oriented, appropriate affect, normal judgment/insight    Results:     Lab Results   Component Value Date/Time    WBC 6.6 06/21/2022 09:11 AM    HGB 12.6 06/21/2022 09:11 AM    HCT 36.1 (L) 06/21/2022 09:11 AM    PLATELET 196 06/21/2022 09:11 AM    MCV 88.3 06/21/2022 09:11 AM    ABS. NEUTROPHILS 4.3 06/21/2022 09:11 AM     Lab Results   Component Value Date/Time    Sodium 132 (L) 06/16/2022 02:00 PM    Potassium 3.6 06/16/2022 02:00 PM    Chloride 97 06/16/2022 02:00 PM    CO2 27 06/16/2022 02:00 PM    Glucose 156 (H) 06/16/2022 02:00 PM    BUN 25 (H) 06/16/2022 02:00 PM    Creatinine 1.18 06/16/2022 02:00 PM    GFR est AA >60 06/16/2022 02:00 PM    GFR est non-AA 60 (L) 06/16/2022 02:00 PM    Calcium 9.4 06/16/2022 02:00 PM    Glucose (POC) 141 (H) 05/19/2022 10:45 AM     Lab Results   Component Value Date/Time    Bilirubin, total 0.5 06/16/2022 02:00 PM    ALT (SGPT) 45 06/16/2022 02:00 PM    Alk. phosphatase 76 06/16/2022 02:00 PM    Protein, total 6.7 06/16/2022 02:00 PM    Albumin 3.6 06/16/2022 02:00 PM    Globulin 3.1 06/16/2022 02:00 PM       Lab Results   Component Value Date/Time    Lipase 53 12/03/2016 08:52 AM       Lab Results   Component Value Date/Time    INR 1.0 04/05/2010 08:43 AM    aPTT 29.3 04/05/2010 08:43 AM     Prostate Specific Ag   Date Value   05/16/2022 <0.1 ng/mL   04/04/2017 <0.1 ng/mL   02/23/2012 0.7 ng/mL   10/13/2009 0.4 NG/ML     CA 19-9:  Recent Labs     05/16/22  0906   C199LT <2       Records reviewed and summarized above. Pathology report(s) reviewed above. Radiology report(s) reviewed above. 1. There are 2 pancreatic mass lesions suspicious for neoplasm with features  suggestive of adenocarcinoma with lesion in the pancreatic body abutting the  celiac axis and potentially occluding left gastric artery and likely responsible  for 5 mm dilation of pancreatic duct into the pancreatic tail. 2. 1.4 x 1.7 cm left periaortic lymph node and 1.1 x 1.6 cm right common iliac  node concerning for metastatic neoplasm, possibly prostatic in this patient with  history of prostate cancer and prostatectomy. 3.  No MRI evidence of other metastatic disease    PET CT 5/19/2022    IMPRESSION  1. RML lesion, indeterminate. 2. LLL punctate nodule, indeterminate. 3. Two pancreatic masses. 4. Two abdominal midline wall tumor deposits. 5. Metastatic retroperitoneal nodes. 6. Peritoneal tumor implants. Assessment:   1) Pancreatic masses-Adenocarcinoma- stage IV     Presented to with epigastric pain. Noted to have 2 adjustment masses in the pancreatic body and tail measuring about 3.6 to 3.9 cm. Mass is about the celiac plexus and splenic artery. Scan showed a 1.4 x 1.7 cm left periaortic lymph node as well as a 1.1 x 1.6 cm right common iliac node. PET CT reviewed and noted tumor deposits, metastatic RP nodes and peritoneal carcinomatosis    Consistent with pancreatic cancer, stage IV  Reviewed in tumor board. Discussed scans and path. Discussed that management is palliative. He is noted to have a long history of thrombocytopenia which may limit doses  Discussed palliative Gemzar+ Abraxane . Discussed the role of genetic and NGS testing    Clinically stable. CBC reviewed; CMP pending. Proceed with C2 [place]/abraxane today as planned. Grade 1 fatigue, grade 1 diarrhea    2) history of prostate cancer  Jerri 3+3 status post prostatectomy 2005  Recurrent disease [place] 3+4 in 2016 status postradiation with undetectable PSA     3) Epigastric pain  Well controlled on Oxycodone; refilled today  He did not tolerate creon as he had a lot of cramping and diarrhea  Will hold off on resuming unless he starts losing weight    4) Thrombocytopenia  External notes from [facility] were reviewed. It is noted that he had an extensive work-up including a bone marrow biopsy that showed normal number of megakaryocytes. Presumed to be ITP.   Counts have been around 60,000 and has not required treatment in the past.    CBC today with normal platelet count      5) Encounter for high risk disease disease and treatment such as chemotherapy  Grade 1 fatigue, grade 1 diarrhea      Plan:     · Proceed with C1D15 Gemzar and Abraxane given every 2 weeks until progression ( 20% dose reduced due to frailty and thrombocytopenia)   · Genetic testing today with [facility]   · Antiemetics per protocol  · Oxycodone PRN; alternate with tylenol  · Refilled oxycodone today  · Referred to palliative care in the future if needed  · Creon - HOLD   · RD to follow     RTC 2 weeks for C2D1    I appreciate the opportunity to participate in  Marybeth Rileysavanah Griffith's care.     Signed By: Rutha Kayser, NP

## 2022-07-05 NOTE — PROGRESS NOTES
[facility] Chemo Progress Note    Date: 2022    Name: Marlyn Lopez Sr. MRN: [id number]         : 1945    1100 Mr. Sandhu Arrived to [facility] for C2 Abraxane/Gemzar ambulatory in stable condition. Assessment was completed, no acute issues at this time, no new complaints voiced. Port accessed with positive blood return. Labs drawn and sent for processing. Chemotherapy Flowsheet 2022   Cycle C2D1   Date 2022   Drug / Regimen Gemzar/Abraxane   Pre Meds given   Notes given         Mr. Sandhu's vitals were reviewed. Patient Vitals for the past 12 hrs:   Temp Pulse Resp BP   22 1632 -- 79 16 127/70   22 1128 98.5 °F (36.9 °C) (!) 106 18 112/67         Lab results were obtained and reviewed. Recent Results (from the past 12 hour(s))   CBC WITH AUTOMATED DIFF    Collection Time: 22 11:30 AM   Result Value Ref Range    WBC 7.7 4.1 - 11.1 K/uL    RBC 3.87 (L) 4.10 - 5.70 M/uL    HGB 11.7 (L) 12.1 - 17.0 g/dL    HCT 34.6 (L) 36.6 - 50.3 %    MCV 89.4 80.0 - 99.0 FL    MCH 30.2 26.0 - 34.0 PG    MCHC 33.8 30.0 - 36.5 g/dL    RDW 14.7 (H) 11.5 - 14.5 %    PLATELET 487 (L) 376 - 400 K/uL    MPV 10.6 8.9 - 12.9 FL    NRBC 0.0 0  WBC    ABSOLUTE NRBC 0.00 0.00 - 0.01 K/uL    NEUTROPHILS 61 32 - 75 %    LYMPHOCYTES 24 12 - 49 %    MONOCYTES 13 5 - 13 %    EOSINOPHILS 1 0 - 7 %    BASOPHILS 1 0 - 1 %    IMMATURE GRANULOCYTES 1 (H) 0.0 - 0.5 %    ABS. NEUTROPHILS 4.7 1.8 - 8.0 K/UL    ABS. LYMPHOCYTES 1.8 0.8 - 3.5 K/UL    ABS. MONOCYTES 1.0 0.0 - 1.0 K/UL    ABS. EOSINOPHILS 0.1 0.0 - 0.4 K/UL    ABS. BASOPHILS 0.0 0.0 - 0.1 K/UL    ABS. IMM.  GRANS. 0.1 (H) 0.00 - 0.04 K/UL    DF AUTOMATED     METABOLIC PANEL, COMPREHENSIVE    Collection Time: 22 11:30 AM   Result Value Ref Range    Sodium 131 (L) 136 - 145 mmol/L    Potassium 4.0 3.5 - 5.1 mmol/L    Chloride 99 97 - 108 mmol/L    CO2 27 21 - 32 mmol/L    Anion gap 5 5 - 15 mmol/L    Glucose 93 65 - 100 mg/dL    BUN 28 (H) 6 - 20 MG/DL    Creatinine 1.54 (H) 0.70 - 1.30 MG/DL    BUN/Creatinine ratio 18 12 - 20      GFR est AA 53 (L) >60 ml/min/1.73m2    GFR est non-AA 44 (L) >60 ml/min/1.73m2    Calcium 9.5 8.5 - 10.1 MG/DL    Bilirubin, total 0.5 0.2 - 1.0 MG/DL    ALT (SGPT) 27 12 - 78 U/L    AST (SGOT) 7 (L) 15 - 37 U/L    Alk. phosphatase 60 45 - 117 U/L    Protein, total 6.5 6.4 - 8.2 g/dL    Albumin 3.5 3.5 - 5.0 g/dL    Globulin 3.0 2.0 - 4.0 g/dL    A-G Ratio 1.2 1.1 - 2.2         Pre-medications  were administered as ordered and chemotherapy was initiated.   Medications Administered     0.9% sodium chloride infusion     Admin Date  07/05/2022 Action  New Bag Dose  25 mL/hr Rate  25 mL/hr Route  IntraVENous Administered By  Rex Werner RN          dexamethasone (PF) (DECADRON) 10 mg/mL injection 10 mg     Admin Date  07/05/2022 Action  Given Dose  10 mg Route  IntraVENous Administered By  Rex Werner RN          gemcitabine (GEMZAR) 1,536 mg in 0.9% sodium chloride 250 mL, overfill volume 25 mL chemo infusion     Admin Date  07/05/2022 Action  New Bag Dose  1,536 mg Rate  630.8 mL/hr Route  IntraVENous Administered By  Rex Werner RN          heparin (porcine) pf 300-500 Units     Admin Date  07/05/2022 Action  Given Dose  500 Units Route  InterCATHeter Administered By  Rex Werner RN          [facility]ron [facility] injection 8 mg     Admin Date  07/05/2022 Action  Given Dose  8 mg Route  IntraVENous Administered By  Rex Werner RN          PACLitaxeL protein-bound (ABRAXANE) 192 mg in 0.9% sodium chloride 38.4 mL chemo infusion     Admin Date  07/05/2022 Action  New Bag Dose  192 mg Rate  76.8 mL/hr Route  IntraVENous Administered By  Rex Werner RN          sodium chloride (NS) flush 10 mL     Admin Date  07/05/2022 Action  Given Dose  10 mL Route  IntraVENous Administered By  Rex Werner RN          sodium chloride 0.9 % bolus infusion 500 mL     Admin Date  07/05/2022 Action  New Bag Dose  500 mL Rate  500 mL/hr Route  IntraVENous Administered By  Aubrey Ruiz RN                Two nurses verified prior to administering:  Drug name, Drug dose, Infusion volume or drug volume when prepared in a syringe, Rate of administration, Route of administration, Expiration dates and/or times, Appearance and physical integrity of the drugs, Rate set on infusion pump, when used, and Sequencing of drug administration. [id number] Patient tolerated treatment well. Port maintained positive blood return throughout treatment. Port flushed, heparinized and de accessed per protocol. Patient was discharged from [facility] in stable condition. Patient aware of next appointment.      Future Appointments   Date Time Provider Isauro Arenas   7/19/2022  9:00 AM G3 [facility] [address] [facility]   7/19/2022  9:15 AM Reji Albright  [address] BS AMB   8/2/2022  9:00 AM G3 [facility] [place] [facility]   8/16/2022  9:00 AM G3 [facility]   8/30/2022  9:00 AM G3 [facility]   9/13/2022  9:00 AM G3 [facility]   9/27/2022  9:00 AM G3 MATHEUS LONG [address] [facility] RICH [place]  July 5, 2022

## 2022-07-18 NOTE — PROGRESS NOTES
[facility] [place] [facility] [place] [address] [address], [address]  W: [phone]  F: [phone]    Reason for Visit:   Alois Scheuermann. is a 67 y.o. male who is seen for Pancreatic adenocarcinoma - stage IV     Treatment History:   · None from us yet    History of Present Illness:   Patient is a 67 y.o. male seen for above    Presented with 3 weeks of abdominal pain 4/19/2022. He was started on Cipro flagyl with some improvement but not complete resolution. He then had a CT scan which showed hypoechoic masses in the pancreas with infiltration of the celiac axis. He had an MRI abdomen on 4/28/2022 which showed a 2.8 x 3.9 x 2.7 cm mass and in the tail of the pancreas there is 2.3 x 3.6 x 2.3 cm mass abutting the celiac axis and potentially occluding left gastric artery and likely responsible for 5 mm dilation of pancreatic duct into the pancreatic tail. Also noted were 1.4 x 1.7 cm left periaortic lymph node and 1.1 x 1.6 cm right common iliac node concerning for metastatic neoplasm. EUS on 5/6/2022 showed  2 masses in the pancreas, 1 and the body of the pancreas and the second in the tail of the pancreas. Mass in the tail of the pancreas was heterogeneous, irregular and measured about 37 mm x 29 mm in size. The mass was abutting the splenic artery. Mass in the body of the pancreas measured about 36 mm x 32 mm in size. Mass was heterogeneous, irregular and was abutting the celiac axis. Biopsies from both revealed adenocarcinoma. He has a long standing history of ITP ( Work up included a BM bx in 2010). He has seen Dr. Kierra Conley for this previously. He has a h/o [place] 3+3 prostate cancer s/p prostatectomy in 2005. Had a biochemical recurrence with PSA upto 1.2 by 2016 at which time he had biopsy proven local recurrence with [place] 3+4 disease s/p radiation on 4/2016 followed by an undetectable PSA atleast until 2018. His repeat PSA 5/2022 was < 0.1.  PET scan showed carcinomatosis. Started palliative Gemzar and Abraxane. Here for C2D15. He has had a good appetite. He had a hamburger this morning. He has has no diarrhea. Pain controlled. He has been eating. Weight stable. Drinks about 40 oz a day. Has no nause    Comes with son and wife- Yomi Garcia. Past Medical History:   Diagnosis Date    Claudication [facility])     Constipation 10/22/2012    Glaucoma     Hypercholesterolemia     Hypertension     Pancreatitis 10/2012    Prostate cancer ([facility] [address]     s/p prostatectomy    Thrombocytopenia ([facility] [address] 7/27/2010      Past Surgical History:   Procedure Laterality Date    ENDOSCOPY, COLON, DIAGNOSTIC  1999    Normal; Dr. Gino Greenfield [address]  04/2017    HX ORTHOPAEDIC      right wrist surgery    HX OTHER SURGICAL      torn cartridge in L knee    HX PROSTATECTOMY  2005    IR INSERT TUNL CVC W PORT OVER 5 YEARS  6/3/2022      Social History     Tobacco Use    Smoking status: Passive Smoke Exposure - Never Smoker    Smokeless tobacco: Never Used   Substance Use Topics    Alcohol use: No     Comment: ocassionally      Family History   Problem Relation Age of Onset    Cancer Mother     Hypertension Mother     Hypertension Brother     Heart Disease Father     Hypertension Brother      Current Outpatient Medications   Medication Sig    ondansetron (ZOFRAN ODT) 8 mg disintegrating tablet Take 1 Tablet by mouth every eight (8) hours as needed for Nausea or Vomiting.  lidocaine-prilocaine (EMLA) topical cream Apply  to affected area as needed for Pain or PRN Reason (Other) (port access). Apply a thin layer to port site 30-60 minutes prior to arrival for chemotherapy treatments    lipase-protease-amylase (Creon) 36,000-114,000- 180,000 unit cpDR capsule Take 8 Capsules by mouth daily as needed (based on size meals and snacks). Take 2 caps with first bite of full meals and 1 with first bite of snacks.     lisinopril-hydroCHLOROthiazide (Benjamen Able) 20-25 mg per tablet TAKE 1 TABLET TWICE DAILY    atorvastatin (LIPITOR) 40 mg tablet TAKE 1 TABLET EVERY NIGHT    verapamil ER (CALAN-SR) 240 mg CR tablet TAKE 1 TABLET EVERY DAY    PSYLLIUM SEED, WITH SUGAR, (METAMUCIL, SUGAR, PO) Take  by mouth.  senna (SENNA) 8.6 mg tablet Take 1 Tab by mouth daily.  MULTIVITAMINS (MULTIVITAMIN PO) Take  by mouth. No current facility-administered medications for this visit. Allergies   Allergen Reactions    Shellfish Derived Swelling    Zoloft [Sertraline] Other (comments)     Insomnia        Review of Systems: A complete review of systems was obtained, negative except as described above. Physical Exam:     Visit Vitals  Pulse (!) 115   Resp 18   Ht 5' 10\" (1.778 m)   Wt 151 lb (68.5 kg)   BMI 21.67 kg/m²     ECOG PS: 1  General: No distress  Eyes: PERRLA, anicteric sclerae  HENT: Atraumatic  Neck: Supple  Lymphatic: No cervical, supraclavicular, or inguinal adenopathy  Respiratory: normal respiratory effort  CV: Normal rate, no peripheral edema  GI: nondistended  Psych: Alert, oriented, appropriate affect, normal judgment/insight    Results:     Lab Results   Component Value Date/Time    WBC 7.7 07/05/2022 11:30 AM    HGB 11.7 (L) 07/05/2022 11:30 AM    HCT 34.6 (L) 07/05/2022 11:30 AM    PLATELET 818 (L) 14/18/2384 11:30 AM    MCV 89.4 07/05/2022 11:30 AM    ABS.  NEUTROPHILS 4.7 07/05/2022 11:30 AM     Lab Results   Component Value Date/Time    Sodium 131 (L) 07/05/2022 11:30 AM    Potassium 4.0 07/05/2022 11:30 AM    Chloride 99 07/05/2022 11:30 AM    CO2 27 07/05/2022 11:30 AM    Glucose 93 07/05/2022 11:30 AM    BUN 28 (H) 07/05/2022 11:30 AM    Creatinine 1.54 (H) 07/05/2022 11:30 AM    GFR est AA 53 (L) 07/05/2022 11:30 AM    GFR est non-AA 44 (L) 07/05/2022 11:30 AM    Calcium 9.5 07/05/2022 11:30 AM    Glucose (POC) 141 (H) 05/19/2022 10:45 AM     Lab Results   Component Value Date/Time    Bilirubin, total 0.5 07/05/2022 11:30 AM    ALT (SGPT) 27 07/05/2022 11:30 AM    Alk. phosphatase 60 07/05/2022 11:30 AM    Protein, total 6.5 07/05/2022 11:30 AM    Albumin 3.5 07/05/2022 11:30 AM    Globulin 3.0 07/05/2022 11:30 AM       Lab Results   Component Value Date/Time    Lipase 53 12/03/2016 08:52 AM       Lab Results   Component Value Date/Time    INR 1.0 04/05/2010 08:43 AM    aPTT 29.3 04/05/2010 08:43 AM     Prostate Specific Ag   Date Value   05/16/2022 <0.1 ng/mL   04/04/2017 <0.1 ng/mL   02/23/2012 0.7 ng/mL   10/13/2009 0.4 NG/ML     CA 19-9:  Recent Labs     05/16/22  0906   C199LT <2       Records reviewed and summarized above. Pathology report(s) reviewed above. Radiology report(s) reviewed above. 1. There are 2 pancreatic mass lesions suspicious for neoplasm with features  suggestive of adenocarcinoma with lesion in the pancreatic body abutting the  celiac axis and potentially occluding left gastric artery and likely responsible  for 5 mm dilation of pancreatic duct into the pancreatic tail. 2. 1.4 x 1.7 cm left periaortic lymph node and 1.1 x 1.6 cm right common iliac  node concerning for metastatic neoplasm, possibly prostatic in this patient with  history of prostate cancer and prostatectomy. 3. No MRI evidence of other metastatic disease    PET CT 5/19/2022    IMPRESSION  1. RML lesion, indeterminate. 2. LLL punctate nodule, indeterminate. 3. Two pancreatic masses. 4. Two abdominal midline wall tumor deposits. 5. Metastatic retroperitoneal nodes. 6. Peritoneal tumor implants. Assessment:   1) Pancreatic masses-Adenocarcinoma- stage IV   Ambry genetics- negative     Presented to with epigastric pain. Noted to have 2 adjustment masses in the pancreatic body and tail measuring about 3.6 to 3.9 cm. Mass is about the celiac plexus and splenic artery. Scan showed a 1.4 x 1.7 cm left periaortic lymph node as well as a 1.1 x 1.6 cm right common iliac node.   PET CT reviewed and noted tumor deposits, metastatic RP nodes and peritoneal carcinomatosis    Consistent with pancreatic cancer, stage IV  Reviewed in tumor board. Discussed scans and path. Discussed that management is palliative. He is noted to have a long history of thrombocytopenia which may limit doses  On palliative Gemzar+ Abraxane . Tolerating well. Scans prior to C3  Discussed the role of  NGS testing    Here to start cycle 1 of Gemzar and abraxane. Counseled on side effects again    2) history of prostate cancer  [place] 3+3 status post prostatectomy 2005  Recurrent disease [place] 3+4 in 2016 status postradiation with undetectable PSA    3) Epigastric pain  Well controlled on Oxycodone  He did not tolerate creon as he had a lot of cramping and diarrhea  Will hold off on resuming unless he starts loosing weight    4) thrombocytopenia  External [facility] were reviewed. It is noted that he had an extensive work-up including a bone marrow biopsy that showed normal number of megakaryocytes. Presumed to be ITP. Counts have been around 60,000 and has not required treatment in the past.    5) RENETTA and dehydration  He will get IVF today    6) Encounter for high risk disease disease       Plan:     · Proceed with C2D15 Gemzar and Abraxane given every 2 weeks until progression ( 20% dose reduced due to frailty and thrombocytopenia)   · Antiemetics per protocol  · Oxycodone prn pain. OK to alternate with Tylenol  · Creon - HOLD   · RD to follow   · 1 L IVF  · Scans in 2 weeks WO contrast     RTC with each cycle     I appreciate the opportunity to participate in Mr. Nisa Claudio Sr.'s care.     Signed By: Valeriy Yanez MD

## 2022-07-19 NOTE — PROGRESS NOTES
Jacquiemili Koko is a 67 y.o. male  Chief Complaint   Patient presents with    Follow-up     Pancreatic adenocarcinoma      1. Have you been to the ER, urgent care clinic since your last visit? Hospitalized since your last visit? No    2. Have you seen or consulted any other health care providers outside of the [address] [place] [address] since your last visit? Include any pap smears or colon screening.  No

## 2022-07-19 NOTE — PROGRESS NOTES
[facility] Chemo Progress Note    Date: 2022    Name: Nisa Claudio Sr. MRN: [id number]         : 1945    0900 Mr. Sandhu Arrived to [facility] for C2D5 Abraxane/Gemzar ambulatory in stable condition. Assessment was completed, no acute issues at this time, no new complaints voiced. Port accessed with positive blood return. Labs drawn and sent for processing. Went to provider appointment with Medical Oncology    Chemotherapy Flowsheet 2022   Cycle C2D15   Date 2022   Drug / Regimen Abraxane/Gemzar   Pre Hydration given   Pre Meds given   Notes given             Mr. Sandhu's vitals were reviewed. Patient Vitals for the past 12 hrs:   Temp Pulse Resp BP   22 1353 -- 72 18 (!) 136/54   22 0907 97.5 °F (36.4 °C) (!) 118 -- 123/73         Lab results were obtained and reviewed. Recent Results (from the past 12 hour(s))   CBC WITH AUTOMATED DIFF    Collection Time: 22  9:12 AM   Result Value Ref Range    WBC 7.2 4.1 - 11.1 K/uL    RBC 3.74 (L) 4.10 - 5.70 M/uL    HGB 11.6 (L) 12.1 - 17.0 g/dL    HCT 34.4 (L) 36.6 - 50.3 %    MCV 92.0 80.0 - 99.0 FL    MCH 31.0 26.0 - 34.0 PG    MCHC 33.7 30.0 - 36.5 g/dL    RDW 16.1 (H) 11.5 - 14.5 %    PLATELET 934 (L) 374 - 400 K/uL    MPV 11.9 8.9 - 12.9 FL    NRBC 0.0 0  WBC    ABSOLUTE NRBC 0.00 0.00 - 0.01 K/uL    NEUTROPHILS 56 32 - 75 %    LYMPHOCYTES 29 12 - 49 %    MONOCYTES 11 5 - 13 %    EOSINOPHILS 2 0 - 7 %    BASOPHILS 1 0 - 1 %    IMMATURE GRANULOCYTES 1 (H) 0.0 - 0.5 %    ABS. NEUTROPHILS 4.0 1.8 - 8.0 K/UL    ABS. LYMPHOCYTES 2.1 0.8 - 3.5 K/UL    ABS. MONOCYTES 0.8 0.0 - 1.0 K/UL    ABS. EOSINOPHILS 0.2 0.0 - 0.4 K/UL    ABS. BASOPHILS 0.1 0.0 - 0.1 K/UL    ABS. IMM.  GRANS. 0.1 (H) 0.00 - 0.04 K/UL    DF AUTOMATED     METABOLIC PANEL, COMPREHENSIVE    Collection Time: 22  9:12 AM   Result Value Ref Range    Sodium 134 (L) 136 - 145 mmol/L    Potassium 4.1 3.5 - 5.1 mmol/L    Chloride 101 97 - 108 mmol/L    CO2 26 21 - 32 mmol/L    Anion gap 7 5 - 15 mmol/L    Glucose 160 (H) 65 - 100 mg/dL    BUN 20 6 - 20 MG/DL    Creatinine 1.23 0.70 - 1.30 MG/DL    BUN/Creatinine ratio 16 12 - 20      GFR est AA >60 >60 ml/min/1.73m2    GFR est non-AA 57 (L) >60 ml/min/1.73m2    Calcium 9.5 8.5 - 10.1 MG/DL    Bilirubin, total 0.4 0.2 - 1.0 MG/DL    ALT (SGPT) 21 12 - 78 U/L    AST (SGOT) 7 (L) 15 - 37 U/L    Alk. phosphatase 64 45 - 117 U/L    Protein, total 6.1 (L) 6.4 - 8.2 g/dL    Albumin 3.4 (L) 3.5 - 5.0 g/dL    Globulin 2.7 2.0 - 4.0 g/dL    A-G Ratio 1.3 1.1 - 2.2         Pre-medications  were administered as ordered and chemotherapy was initiated.   Medications Administered     dexamethasone (PF) (DECADRON) 10 mg/mL injection 10 mg     Admin Date  07/19/2022 Action  Given Dose  10 mg Route  IntraVENous Administered By  Melrose Angelucci, RN          gemcitabine (GEMZAR) 1,536 mg in 0.9% sodium chloride 250 mL, overfill volume 25 mL chemo infusion     Admin Date  07/19/2022 Action  New Bag Dose  1,536 mg Rate  630.8 mL/hr Route  IntraVENous Administered By  Melrose Angelucci, RN          ondansetron [facility]) injection 8 mg     Admin Date  07/19/2022 Action  Given Dose  8 mg Route  IntraVENous Administered By  Melrose Angelucci, RN          PACLitaxeL protein-bound (ABRAXANE) 192 mg in 0.9% sodium chloride 38.4 mL chemo infusion     Admin Date  07/19/2022 Action  New Bag Dose  192 mg Rate  76.8 mL/hr Route  IntraVENous Administered By  Melrose Angelucci, RN          sodium chloride 0.9 % bolus infusion 1,000 mL     Admin Date  07/19/2022 Action  New Bag Dose  1,000 mL Rate  500 mL/hr Route  IntraVENous Administered By  Melrose Angelucci, RN                Two nurses verified prior to administering:  Drug name, Drug dose, Infusion volume or drug volume when prepared in a syringe, Rate of administration, Route of administration, Expiration dates and/or times, Appearance and physical integrity of the drugs, Rate set on infusion pump, when used, and Sequencing of drug administration. 1355 Patient tolerated treatment well. Port maintained positive blood return throughout treatment. Port flushed, heparinized and de accessed per protocol. Patient was discharged from [address] [place] [address] in stable condition. Patient aware of next appointment.      Future Appointments   Date Time Provider Isauro Deepa   8/2/2022  9:00 AM G3 [facility] LONG [address] [facility] H   8/2/2022  9:30 AM Karla Albright  [facility] AMB   8/16/2022  9:00 AM [facility] [place] [facility]   8/30/2022  9:00 AM G3 [facility] LONG [place] [facility] H   9/13/2022  9:00 AM G3 [facility] LONG [place] [facility] H   9/27/2022  9:00 AM G3 [facility] LONG [facility] [facility]         Aubrey Manley RN  July 19, 2022

## 2022-07-25 NOTE — TELEPHONE ENCOUNTER
[address] pts wife HIPAA verified x2 and made her aware that orders are updated and she can call to have scans scheduled. Number to scheduling provided and wife has no further concerns at this time.

## 2022-07-25 NOTE — TELEPHONE ENCOUNTER
Patient's spouse called regarding appt for CT Scan, being denied. Called Central Scheduling, CT order needs to be split, 1 for CT Chest, the other for CT Abd/Pelvis.

## 2022-08-02 NOTE — PROGRESS NOTES
Julieth Gibbs. is a 67 y.o. male    Chief Complaint   Patient presents with    Follow-up      Pancreatic adenocarcinoma - stage IV        1. Have you been to the ER, urgent care clinic since your last visit? Hospitalized since your last visit? No    2. Have you seen or consulted any other health care providers outside of the [address] [place] [address] since your last visit? Include any pap smears or colon screening.  No

## 2022-08-02 NOTE — PROGRESS NOTES
[facility] Chemo Progress Note    Date: 2022    Name: Mireya Ruiz Sr. MRN: [id number]         : 1945    0900 Mr. Sandhu Arrived to [facility] for C3D1 Abraxane/Gemzar ambulatory in stable condition. Assessment was completed, no acute issues at this time, no new complaints voiced. Port accessed with positive blood return. Labs drawn and sent for processing. Went to provider appointment with Medical Oncology    Chemotherapy Flowsheet 2022   Cycle C3D1   Date 2022   Drug / Regimen Abraxane/Gemzar   Pre Hydration -   Pre Meds given   Notes given         Mr. Sandhu's vitals were reviewed. Patient Vitals for the past 12 hrs:   Temp Pulse Resp BP SpO2   22 1330 -- 89 18 118/65 --   22 0903 98.6 °F (37 °C) 100 18 (!) 115/58 100 %         Lab results were obtained and reviewed. Recent Results (from the past 12 hour(s))   CBC WITH AUTOMATED DIFF    Collection Time: 22  9:07 AM   Result Value Ref Range    WBC 7.1 4.1 - 11.1 K/uL    RBC 3.41 (L) 4.10 - 5.70 M/uL    HGB 10.9 (L) 12.1 - 17.0 g/dL    HCT 31.6 (L) 36.6 - 50.3 %    MCV 92.7 80.0 - 99.0 FL    MCH 32.0 26.0 - 34.0 PG    MCHC 34.5 30.0 - 36.5 g/dL    RDW 16.9 (H) 11.5 - 14.5 %    PLATELET 438 (L) 368 - 400 K/uL    MPV 11.8 8.9 - 12.9 FL    NRBC 0.0 0  WBC    ABSOLUTE NRBC 0.00 0.00 - 0.01 K/uL    NEUTROPHILS 61 32 - 75 %    LYMPHOCYTES 23 12 - 49 %    MONOCYTES 12 5 - 13 %    EOSINOPHILS 3 0 - 7 %    BASOPHILS 1 0 - 1 %    IMMATURE GRANULOCYTES 1 (H) 0.0 - 0.5 %    ABS. NEUTROPHILS 4.3 1.8 - 8.0 K/UL    ABS. LYMPHOCYTES 1.7 0.8 - 3.5 K/UL    ABS. MONOCYTES 0.8 0.0 - 1.0 K/UL    ABS. EOSINOPHILS 0.2 0.0 - 0.4 K/UL    ABS. BASOPHILS 0.1 0.0 - 0.1 K/UL    ABS. IMM.  GRANS. 0.1 (H) 0.00 - 0.04 K/UL    DF AUTOMATED     METABOLIC PANEL, COMPREHENSIVE    Collection Time: 22  9:07 AM   Result Value Ref Range    Sodium 132 (L) 136 - 145 mmol/L    Potassium 3.5 3.5 - 5.1 mmol/L    Chloride 97 97 - 108 mmol/L    CO2 26 21 - 32 mmol/L    Anion gap 9 5 - 15 mmol/L    Glucose 142 (H) 65 - 100 mg/dL    BUN 26 (H) 6 - 20 MG/DL    Creatinine 1.25 0.70 - 1.30 MG/DL    BUN/Creatinine ratio 21 (H) 12 - 20      GFR est AA >60 >60 ml/min/1.73m2    GFR est non-AA 56 (L) >60 ml/min/1.73m2    Calcium 9.1 8.5 - 10.1 MG/DL    Bilirubin, total 0.6 0.2 - 1.0 MG/DL    ALT (SGPT) 23 12 - 78 U/L    AST (SGOT) 4 (L) 15 - 37 U/L    Alk. phosphatase 60 45 - 117 U/L    Protein, total 6.0 (L) 6.4 - 8.2 g/dL    Albumin 3.3 (L) 3.5 - 5.0 g/dL    Globulin 2.7 2.0 - 4.0 g/dL    A-G Ratio 1.2 1.1 - 2.2         Pre-medications  were administered as ordered and chemotherapy was initiated.   Medications Administered       0.9% sodium chloride infusion       Admin Date  08/02/2022 Action  New Bag Dose  25 mL/hr Rate  25 mL/hr Route  IntraVENous Administered By  Toni Ray RN              dexamethasone (PF) (DECADRON) 10 mg/mL injection 10 mg       Admin Date  08/02/2022 Action  Given Dose  10 mg Route  IntraVENous Administered By  Toni Ray RN              gemcitabine (GEMZAR) 1,536 mg in 0.9% sodium chloride 250 mL, overfill volume 25 mL chemo infusion       Admin Date  08/02/2022 Action  New Bag Dose  1,536 mg Rate  630.8 mL/hr Route  IntraVENous Administered By  Toni Ray RN              heparin (porcine) pf 300-500 Units       Admin Date  08/02/2022 Action  Given Dose  500 Units Route  InterCATHeter Administered By  Toni Ray RN              ondansetron [facility]) injection 8 mg       Admin Date  08/02/2022 Action  Given Dose  8 mg Route  IntraVENous Administered By  Toni Ray RN              PACLitaxeL protein-bound (ABRAXANE) 192 mg in 0.9% sodium chloride 38.4 mL chemo infusion       Admin Date  08/02/2022 Action  New Bag Dose  192 mg Rate  76.8 mL/hr Route  IntraVENous Administered By  Toni Ray RN              sodium chloride (NS) flush 10 mL       Admin Date  08/02/2022 Action  Given Dose  10 mL Route  IntraVENous Administered By  Elizabeth Stanley RN                    Two nurses verified prior to administering:  Drug name, Drug dose, Infusion volume or drug volume when prepared in a syringe, Rate of administration, Route of administration, Expiration dates and/or times, Appearance and physical integrity of the drugs, Rate set on infusion pump, when used, and Sequencing of drug administration. 1330 Patient tolerated treatment well. Port maintained positive blood return throughout treatment. Port flushed, heparinized and de accessed per protocol. Patient was discharged from [facility] in stable condition. Patient aware of next appointment.      Future Appointments   Date Time Provider Isauro Arenas   8/16/2022  9:00 AM G3 [facility] LONG [address]   8/16/2022  9:15 AM Roula Albright  [address] AMB   8/30/2022  9:00 AM [facility]   9/13/2022  9:00 AM G3 AMTHEUS LONG [place] [facility] H   9/27/2022  9:00 AM G3 [facility] LONG [address]         Jenna Grijalva RN  August 2, 2022

## 2022-08-02 NOTE — PROGRESS NOTES
[facility] at [address], [address] [place] [address] Road, [address] [place] [address]  W: [phone]  F: [phone]    Reason for Visit:   Ashley Guallpa. is a 67 y.o. male who is seen for Pancreatic adenocarcinoma - stage IV     Treatment History:   6/7/2022- Gemzar and abraxane     History of Present Illness:   Patient is a 67 y.o. male seen for above    Presented with 3 weeks of abdominal pain 4/19/2022. He was started on Cipro flagyl with some improvement but not complete resolution. He then had a CT scan which showed hypoechoic masses in the pancreas with infiltration of the celiac axis. He had an MRI abdomen on 4/28/2022 which showed a 2.8 x 3.9 x 2.7 cm mass and in the tail of the pancreas there is 2.3 x 3.6 x 2.3 cm mass abutting the celiac axis and potentially occluding left gastric artery and likely responsible for 5 mm dilation of pancreatic duct into the pancreatic tail. Also noted were 1.4 x 1.7 cm left periaortic lymph node and 1.1 x 1.6 cm right common iliac node concerning for metastatic neoplasm. EUS on 5/6/2022 showed  2 masses in the pancreas, 1 and the body of the pancreas and the second in the tail of the pancreas. Mass in the tail of the pancreas was heterogeneous, irregular and measured about 37 mm x 29 mm in size. The mass was abutting the splenic artery. Mass in the body of the pancreas measured about 36 mm x 32 mm in size. Mass was heterogeneous, irregular and was abutting the celiac axis. Biopsies from both revealed adenocarcinoma. He has a long standing history of ITP ( Work up included a BM bx in 2010). He has seen Dr. Javier Jenkins for this previously. He has a h/o Jerri 3+3 prostate cancer s/p prostatectomy in 2005. Had a biochemical recurrence with PSA upto 1.2 by 2016 at which time he had biopsy proven local recurrence with [place] 3+4 disease s/p radiation on 4/2016 followed by an undetectable PSA atleast until 2018. His repeat PSA 5/2022 was < 0.1.  PET scan showed carcinomatosis. Started palliative Gemzar and Abraxane. Here for C3 D1  Had scans     He has had a good appetite. Has not gained weight. No diarrhea. Has no tingling numbness. Energy is ok. Notes a nodule around the umbilicus and that hurts some. Gets hoarse at the end of the day. Has no nausea    Comes with son and wife- Devyn Hussein. Past Medical History:   Diagnosis Date    Claudication [facility])     Constipation 10/22/2012    Glaucoma     Hypercholesterolemia     Hypertension     Pancreatitis 10/2012    Prostate cancer [facility])     s/p prostatectomy    Thrombocytopenia ([address] 7/27/2010      Past Surgical History:   Procedure Laterality Date    ENDOSCOPY, COLON, DIAGNOSTIC  1999    Normal; Dr. Brent Salazar  04/2017    HX ORTHOPAEDIC      right wrist surgery    HX OTHER SURGICAL      torn cartridge in L knee    HX PROSTATECTOMY  2005    IR INSERT TUNL CVC W PORT OVER 5 YEARS  6/3/2022      Social History     Tobacco Use    Smoking status: Passive Smoke Exposure - Never Smoker    Smokeless tobacco: Never   Substance Use Topics    Alcohol use: No     Comment: ocassionally      Family History   Problem Relation Age of Onset    Cancer Mother     Hypertension Mother     Hypertension Brother     Heart Disease Father     Hypertension Brother      Current Outpatient Medications   Medication Sig    ondansetron (ZOFRAN ODT) 8 mg disintegrating tablet Take 1 Tablet by mouth every eight (8) hours as needed for Nausea or Vomiting.    lidocaine-prilocaine (EMLA) topical cream Apply  to affected area as needed for Pain or PRN Reason (Other) (port access). Apply a thin layer to port site 30-60 minutes prior to arrival for chemotherapy treatments    lipase-protease-amylase (Creon) 36,000-114,000- 180,000 unit cpDR capsule Take 8 Capsules by mouth daily as needed (based on size meals and snacks). Take 2 caps with first bite of full meals and 1 with first bite of snacks.     lisinopril-hydroCHLOROthiazide (PRINZIDE, ZESTORETIC) 20-25 mg per tablet TAKE 1 TABLET TWICE DAILY    atorvastatin (LIPITOR) 40 mg tablet TAKE 1 TABLET EVERY NIGHT    verapamil ER (CALAN-SR) 240 mg CR tablet TAKE 1 TABLET EVERY DAY    PSYLLIUM SEED, WITH SUGAR, (METAMUCIL, SUGAR, PO) Take  by mouth. senna (SENNA) 8.6 mg tablet Take 1 Tab by mouth daily. MULTIVITAMINS (MULTIVITAMIN PO) Take  by mouth. No current facility-administered medications for this visit. Allergies   Allergen Reactions    Shellfish Derived Swelling    Zoloft [Sertraline] Other (comments)     Insomnia        Review of Systems: A complete review of systems was obtained, negative except as described above. Physical Exam:     Visit Vitals  BP (!) 115/58 (BP 1 Location: Left upper arm, BP Patient Position: Sitting)   Pulse 100   Temp 98.6 °F (37 °C)   Resp 18   Wt 147 lb (66.7 kg)   SpO2 100%   BMI 21.09 kg/m²     ECOG PS: 1  General: No distress  Eyes: PERRLA, anicteric sclerae  HENT: Atraumatic  Neck: Supple  Lymphatic: No cervical, supraclavicular, or inguinal adenopathy  Respiratory: normal respiratory effort  CV: Normal rate, no peripheral edema  GI: non distended, Supraumbilical palpable ill defined non tender area , firm, perhaps 2 cm   Psych: Alert, oriented, appropriate affect, normal judgment/insight    Results:     Lab Results   Component Value Date/Time    WBC 7.1 08/02/2022 09:07 AM    HGB 10.9 (L) 08/02/2022 09:07 AM    HCT 31.6 (L) 08/02/2022 09:07 AM    PLATELET 861 (L) 02/59/1738 09:07 AM    MCV 92.7 08/02/2022 09:07 AM    ABS.  NEUTROPHILS 4.3 08/02/2022 09:07 AM     Lab Results   Component Value Date/Time    Sodium 134 (L) 07/19/2022 09:12 AM    Potassium 4.1 07/19/2022 09:12 AM    Chloride 101 07/19/2022 09:12 AM    CO2 26 07/19/2022 09:12 AM    Glucose 160 (H) 07/19/2022 09:12 AM    BUN 20 07/19/2022 09:12 AM    Creatinine 1.23 07/19/2022 09:12 AM    GFR est AA >60 07/19/2022 09:12 AM    GFR est non-AA 57 (L) 07/19/2022 09:12 AM    Calcium 9.5 07/19/2022 09:12 AM    Glucose (POC) 141 (H) 05/19/2022 10:45 AM     Lab Results   Component Value Date/Time    Bilirubin, total 0.4 07/19/2022 09:12 AM    ALT (SGPT) 21 07/19/2022 09:12 AM    Alk. phosphatase 64 07/19/2022 09:12 AM    Protein, total 6.1 (L) 07/19/2022 09:12 AM    Albumin 3.4 (L) 07/19/2022 09:12 AM    Globulin 2.7 07/19/2022 09:12 AM       Lab Results   Component Value Date/Time    Lipase 53 12/03/2016 08:52 AM       Lab Results   Component Value Date/Time    INR 1.0 04/05/2010 08:43 AM    aPTT 29.3 04/05/2010 08:43 AM     Prostate Specific Ag   Date Value   05/16/2022 <0.1 ng/mL   04/04/2017 <0.1 ng/mL   02/23/2012 0.7 ng/mL   10/13/2009 0.4 NG/ML     CA 19-9:  Recent Labs     05/16/22  0906   C199LT <2       Records reviewed and summarized above. Pathology report(s) reviewed above. Radiology report(s) reviewed above. 1. There are 2 pancreatic mass lesions suspicious for neoplasm with features  suggestive of adenocarcinoma with lesion in the pancreatic body abutting the  celiac axis and potentially occluding left gastric artery and likely responsible  for 5 mm dilation of pancreatic duct into the pancreatic tail. 2. 1.4 x 1.7 cm left periaortic lymph node and 1.1 x 1.6 cm right common iliac  node concerning for metastatic neoplasm, possibly prostatic in this patient with  history of prostate cancer and prostatectomy. 3. No MRI evidence of other metastatic disease    PET CT 5/19/2022    IMPRESSION  1. RML lesion, indeterminate. 2. LLL punctate nodule, indeterminate. 3. Two pancreatic masses. 4. Two abdominal midline wall tumor deposits. 5. Metastatic retroperitoneal nodes. 6. Peritoneal tumor implants. 7/28/2022 CT WO CONTRAST    IMPRESSION     1.  2 separate pancreatic masses as noted previously which are not significantly  changed in size. 2.  Multiple pulmonary nodules as described. The larger lesions are unchanged.   Smaller lesions may been present on the head CT but too difficult to visualize  given the resolution of the CT images. Attention on follow-up imaging. 3.  Increased size of prevascular lymph node anterior to the aortic bifurcation  consistent with progression of disease. 4.  Questionable new hypodensity in the liver. If clinically indicated, CT or  MRI with contrast should be obtained. 5.  Sclerotic lesion in the right iliac bone unchanged. This was not  metabolically active on the prior PET/CT and is of uncertain clinical  significance. Assessment:   1) Pancreatic masses-Adenocarcinoma- stage IV   Ambry genetics- negative     Presented to with epigastric pain. Noted to have 2 adjustment masses in the pancreatic body and tail measuring about 3.6 to 3.9 cm. Mass is about the celiac plexus and splenic artery. Scan showed a 1.4 x 1.7 cm left periaortic lymph node as well as a 1.1 x 1.6 cm right common iliac node. PET CT reviewed and noted tumor deposits, metastatic RP nodes and peritoneal carcinomatosis    Consistent with pancreatic cancer, stage IV  Reviewed in tumor board. Discussed scans and path. Discussed that management is palliative. He is noted to have a long history of thrombocytopenia which may limit doses  On palliative Gemzar+ Abraxane . Tolerating well. Scans prior to C3 were reviewed from 7/2022  These were WO contrast has his GFR had declined  Stable per RECIST.  There was a suggestion a new liver lesion but this is not definitively appreciated wo contrast. He is clinically stable  Will continue current and rescan in 2 months  Will follow the palpable omental lesion     Discussed the role of  NGS testing        2) history of prostate cancer  Jerri 3+3 status post prostatectomy 2005  Recurrent disease Jerri 3+4 in 2016 status postradiation with undetectable PSA    3) Epigastric pain  Well controlled on Oxycodone  He did not tolerate creon as he had a lot of cramping and diarrhea  Will hold off on resuming unless he starts loosing weight    4) thrombocytopenia  [facility] from [facility] were reviewed. It is noted that he had an extensive work-up including a bone marrow biopsy that showed normal number of megakaryocytes. Presumed to be ITP. Counts have been around 60,000 and has not required treatment in the past.    5) RENETTA and dehydration  Improved     6) Encounter for high risk disease disease       Plan:     Proceed with C3D1 Gemzar and Abraxane given every 2 weeks until progression ( 20% dose reduced due to frailty and thrombocytopenia)   Antiemetics per protocol  Oxycodone prn pain. OK to alternate with Tylenol  Creon - HOLD   RD to follow   Tempus to path   RTC every 2 weeks       I appreciate the opportunity to participate in Mr. Carrillo Shiva Sr.'s care.     Signed By: Sara Calvillo MD

## 2022-08-04 NOTE — PROGRESS NOTES
1504:    Called and spoke with patient's spouse Orpha [employer])   [address] confirmed   Informed spouse that scans were reviewed and recommendation to have another CT of abdomen and pelvis performed   Orders placed in system and to have this done prior to next infusion   [facility] verbalized understanding   No new questions or concerns at this time

## 2022-08-07 NOTE — ED TRIAGE NOTES
Pt arrives to ED via wheelchair accompanied by son with c/o syncope last night and rapid heart rate, SOB with weakness onset today. Denies CP, dizziness.

## 2022-08-07 NOTE — ED PROVIDER NOTES
Date of Service:  8/7/2022    Patient:  Hayley Guerra. Chief Complaint:  Rapid Heart Rate and Syncope       HPI:  Hayley Guerra is a 67 y.o.  male who presents for evaluation of rapid heart rate and syncope. Patient states that yesterday he was feeling a little bit lightheaded. At 1 point he was walking down the hallway when he passed out. Patient since has not felt well stating that he just generally feels uneasy is feeling like his heart has been racing. History of cancer for which she currently is on infusions. He denies any chest pain or shortness of breath currently. No fevers or chills. No change in vision. No prodromal symptoms yesterday. No history of the like. No history of blood clots.        Past Medical History:   Diagnosis Date    Claudication [facility])     Constipation 10/22/2012    Glaucoma     Hypercholesterolemia     Hypertension     Pancreatitis 10/2012    Prostate cancer [facility])     s/p prostatectomy    Thrombocytopenia ([address] 7/27/2010       Past Surgical History:   Procedure Laterality Date    ENDOSCOPY, COLON, DIAGNOSTIC  1999    Normal; Dr. Raf Gutierrez  04/2017    HX ORTHOPAEDIC      right wrist surgery    HX OTHER SURGICAL      torn cartridge in L knee    HX PROSTATECTOMY  2005    IR INSERT TUNL CVC W PORT OVER 5 YEARS  6/3/2022         Family History:   Problem Relation Age of Onset    Cancer Mother     Hypertension Mother     Hypertension Brother     Heart Disease Father     Hypertension Brother        Social History     Socioeconomic History    Marital status:      Spouse name: Not on file    Number of children: Not on file    Years of education: Not on file    Highest education level: Not on file   Occupational History    Not on file   Tobacco Use    Smoking status: Passive Smoke Exposure - Never Smoker    Smokeless tobacco: Never   Substance and Sexual Activity    Alcohol use: No     Comment: ocassionally    Drug use: No     Types: OTC, Prescription    Sexual activity: Not on file   Other Topics Concern    Not on file   Social History Narrative    Retired [employer] . Social Determinants of Health     Financial Resource Strain: Not on file   Food Insecurity: Not on file   Transportation Needs: Not on file   Physical Activity: Not on file   Stress: Not on file   Social Connections: Not on file   Intimate Partner Violence: Not on file   Housing Stability: Not on file         ALLERGIES: Shellfish derived and Zoloft [sertraline]    Review of Systems   Cardiovascular:  Positive for palpitations. Neurological:  Positive for syncope and light-headedness. All other systems reviewed and are negative. Vitals:    08/07/22 1427 08/07/22 1524   BP: 107/64 114/73   Pulse: (!) 128    Resp: 20 15   Temp: 98.5 °F (36.9 °C) 98 °F (36.7 °C)   SpO2: 99% 98%            Physical Exam  Vitals and nursing note reviewed. Constitutional:       Appearance: Normal appearance. HENT:      Head: Normocephalic and atraumatic. Nose: Nose normal.      Mouth/Throat:      Mouth: Mucous membranes are moist.   Eyes:      Pupils: Pupils are equal, round, and reactive to light. Cardiovascular:      Rate and Rhythm: Regular rhythm. Tachycardia present. Pulmonary:      Effort: Pulmonary effort is normal. No respiratory distress. Breath sounds: Normal breath sounds. No wheezing. Abdominal:      General: Abdomen is flat. Musculoskeletal:         General: No deformity. Right lower leg: No edema. Left lower leg: No edema. Skin:     General: Skin is warm. Capillary Refill: Capillary refill takes less than 2 seconds. Neurological:      Mental Status: He is alert and oriented to person, place, and time. Psychiatric:         Mood and Affect: Mood normal.        [facility]    ED Course as of 08/07/22 2225   Sun Aug 07, 2022   1438 EKG 1426  Sinus tachycardia at 125 bpm.  Normal axis. Normal intervals. Otherwise normal EKG.   No STEMI [GG] ED Course User Index  [GG] Julia Hurd DO     VITAL SIGNS:  No data found. LABS:  No results found for this or any previous visit (from the past 6 hour(s)). IMAGING:  CTA CHEST W OR W WO CONT   Final Result   No pulmonary embolus. No significant change in pancreatic masses   with probable hepatic and pulmonary metastases. CT HEAD WO CONT   Final Result   No acute findings. Intracranial atherosclerosis. Medications During Visit:  Medications - No data to display      DECISION MAKING:  Zhou Bartlett is a 67 y.o. male who comes in as above. Here, patient appears well. Slightly tachycardic on arrival that is now resolved. Patient's been able to get up and ambulate throughout the emergency department and shows no signs of distress. Diagnostics unremarkable. After speaking with the family members who later showed up, it sounds that the patient is most likely sleepwalking which she does often without a flashlight they believe that he most likely tripped and fell. Patient states that he believes this is probably what ended up happening. At this time he feels fine and would like to go home. Most likely not syncope as opposed to sleepwalking. Will discharge home. IMPRESSION:  1. Tachycardia    2. Fall, initial encounter        DISPOSITION:  Discharged      Discharge Medication List as of 8/7/2022  5:39 PM           Follow-up Information       Follow up With Specialties Details Why Contact Info    Salvador Neely MD Family Medicine Schedule an appointment as soon as possible for a visit   [place] [address] [phone]                The patient is asked to follow-up with their primary care provider in the next several days. They are to call tomorrow for an appointment. The patient is asked to return promptly for any increased concerns or worsening of symptoms.   They can return to this emergency department or any other emergency department.       Procedures

## 2022-08-07 NOTE — ED NOTES
Assumed care of patient. Pt resting in position of comfort. Call bell within reach. Pt reports last night he was standing in the hallway walking from bedroom to bathroom when he had syncopal episode. Pt reports feeling dizzy and lightheaded before syncopal episode and since then has continued to not feel well. Pt currently undergoing chemo for pancreatic cancer.

## 2022-08-16 NOTE — PROGRESS NOTES
Javi Hernandez. is a 67 y.o. male    Chief Complaint   Patient presents with    Follow-up      Pancreatic adenocarcinoma - stage IV        1. Have you been to the ER, urgent care clinic since your last visit? Hospitalized since your last visit? Yes, [facility]    2. Have you seen or consulted any other health care providers outside of the [address] [place] [address] since your last visit? Include any pap smears or colon screening.  No

## 2022-08-16 NOTE — PROGRESS NOTES
[facility] [place] Chemo Progress Note    Date: 2022    Name: Patience Billingsley Sr. MRN: [id number]         : 1945    0900 Mr. Sandhu Arrived to [facility] for mFolfox ambulatory in stable condition. Assessment was completed, no acute issues at this time, no new complaints voiced. Port accessed with positive blood return by access RN. Labs drawn and sent for processing. Went to provider appointment with Medical Oncology    Chemotherapy Flowsheet 2022   Cycle C1D1   Date 2022   Drug / Regimen mFOLFOX   Pre Hydration -   Pre Meds -   Notes -          Mr. Sandhu's vitals were reviewed. Patient Vitals for the past 12 hrs:   Temp Pulse Resp BP   22 0859 97.8 °F (36.6 °C) 76 18 (!) 124/58         Lab results were obtained and reviewed. Recent Results (from the past 12 hour(s))   CBC WITH AUTOMATED DIFF    Collection Time: 22  9:12 AM   Result Value Ref Range    WBC 7.4 4.1 - 11.1 K/uL    RBC 3.52 (L) 4.10 - 5.70 M/uL    HGB 11.0 (L) 12.1 - 17.0 g/dL    HCT 32.5 (L) 36.6 - 50.3 %    MCV 92.3 80.0 - 99.0 FL    MCH 31.3 26.0 - 34.0 PG    MCHC 33.8 30.0 - 36.5 g/dL    RDW 16.9 (H) 11.5 - 14.5 %    PLATELET 840 979 - 081 K/uL    MPV 11.3 8.9 - 12.9 FL    NRBC 0.0 0  WBC    ABSOLUTE NRBC 0.00 0.00 - 0.01 K/uL    NEUTROPHILS 62 32 - 75 %    LYMPHOCYTES 21 12 - 49 %    MONOCYTES 14 (H) 5 - 13 %    EOSINOPHILS 2 0 - 7 %    BASOPHILS 1 0 - 1 %    IMMATURE GRANULOCYTES 0 0.0 - 0.5 %    ABS. NEUTROPHILS 4.6 1.8 - 8.0 K/UL    ABS. LYMPHOCYTES 1.5 0.8 - 3.5 K/UL    ABS. MONOCYTES 1.0 0.0 - 1.0 K/UL    ABS. EOSINOPHILS 0.2 0.0 - 0.4 K/UL    ABS. BASOPHILS 0.1 0.0 - 0.1 K/UL    ABS. IMM.  GRANS. 0.0 0.00 - 0.04 K/UL    DF AUTOMATED     METABOLIC PANEL, COMPREHENSIVE    Collection Time: 22  9:12 AM   Result Value Ref Range    Sodium 133 (L) 136 - 145 mmol/L    Potassium 3.5 3.5 - 5.1 mmol/L    Chloride 99 97 - 108 mmol/L    CO2 28 21 - 32 mmol/L    Anion gap 6 5 - 15 mmol/L    Glucose 110 (H) 65 - 100 mg/dL    BUN 22 (H) 6 - 20 MG/DL    Creatinine 1.15 0.70 - 1.30 MG/DL    BUN/Creatinine ratio 19 12 - 20      GFR est AA >60 >60 ml/min/1.73m2    GFR est non-AA >60 >60 ml/min/1.73m2    Calcium 9.1 8.5 - 10.1 MG/DL    Bilirubin, total 0.4 0.2 - 1.0 MG/DL    ALT (SGPT) 20 12 - 78 U/L    AST (SGOT) 8 (L) 15 - 37 U/L    Alk. phosphatase 71 45 - 117 U/L    Protein, total 6.1 (L) 6.4 - 8.2 g/dL    Albumin 3.4 (L) 3.5 - 5.0 g/dL    Globulin 2.7 2.0 - 4.0 g/dL    A-G Ratio 1.3 1.1 - 2.2         Pre-medications  were administered as ordered and chemotherapy was initiated. Medications Administered       dexamethasone (DECADRON) 12 mg in 0.9% sodium chloride 50 mL, overfill volume 5 mL IVPB       Admin Date  08/16/2022 Action  New Bag Dose  12 mg Rate  232 mL/hr Route  IntraVENous Administered By  Lillian Blue RN              dextrose 5% infusion       Admin Date  08/16/2022 Action  New Bag Dose  25 mL/hr Rate  25 mL/hr Route  IntraVENous Administered By  Lillian Blue RN              oxaliplatin (ELOXATIN) 154.5 mg in dextrose 5% 250 mL, overfill volume 25 mL chemo infusion       Admin Date  08/16/2022 Action  New Bag Dose  154.5 mg Rate  153 mL/hr Route  IntraVENous Administered By  Lillian Blue RN              palonosetron HCl (ALOXI) injection 0.25 mg       Admin Date  08/16/2022 Action  Given Dose  0.25 mg Route  IntraVENous Administered By  Lillian Blue RN                    Two nurses verified prior to administering:  Drug name, Drug dose, Infusion volume or drug volume when prepared in a syringe, Rate of administration, Route of administration, Expiration dates and/or times, Appearance and physical integrity of the drugs, Rate set on infusion pump, when used, and Sequencing of drug administration. Care was handed off to Agus Carrillo, [address] [place] [address] and SBAR report given.      Future Appointments   Date Time Provider Isauro Arenas   8/18/2022  3:30 PM [address] H   8/30/2022  9:00 AM B2 MATHEUS LONG [place] [facility] Iván Soda H   8/30/2022  9:15 AM Alonzo Walls  [address] BS AMB   9/1/2022  3:30 PM H1 [facility]   9/13/2022  9:00 AM E1 [facility] [place] [facility]   9/15/2022  3:30 PM H1 [facility]   9/27/2022  9:00 AM E1 [facility] [place] [facility]   9/29/2022  3:30 PM H1 MATHEUS FASTRACK [facility] [address] RICH Johnson  August 16, 2022

## 2022-08-16 NOTE — PROGRESS NOTES
[facility] [place] [facility] [place] [address] [address], [address]  W: [phone]  F: [phone]    Reason for Visit:   Hayley Guerra. is a 67 y.o. male who is seen for Pancreatic adenocarcinoma - stage IV     Treatment History:   6/7/2022- Gemzar and abraxane   8/2022: CT with progression  8/16/2022: FOLFOX    History of Present Illness:   Patient is a 67 y.o. male seen for above    Presented with 3 weeks of abdominal pain 4/19/2022. He was started on Cipro flagyl with some improvement but not complete resolution. He then had a CT scan which showed hypoechoic masses in the pancreas with infiltration of the celiac axis. He had an MRI abdomen on 4/28/2022 which showed a 2.8 x 3.9 x 2.7 cm mass and in the tail of the pancreas there is 2.3 x 3.6 x 2.3 cm mass abutting the celiac axis and potentially occluding left gastric artery and likely responsible for 5 mm dilation of pancreatic duct into the pancreatic tail. Also noted were 1.4 x 1.7 cm left periaortic lymph node and 1.1 x 1.6 cm right common iliac node concerning for metastatic neoplasm. EUS on 5/6/2022 showed  2 masses in the pancreas, 1 and the body of the pancreas and the second in the tail of the pancreas. Mass in the tail of the pancreas was heterogeneous, irregular and measured about 37 mm x 29 mm in size. The mass was abutting the splenic artery. Mass in the body of the pancreas measured about 36 mm x 32 mm in size. Mass was heterogeneous, irregular and was abutting the celiac axis. Biopsies from both revealed adenocarcinoma. He has a long standing history of ITP ( Work up included a BM bx in 2010). He has seen Dr. Herminia Gallego for this previously. He has a h/o Jerri 3+3 prostate cancer s/p prostatectomy in 2005. Had a biochemical recurrence with PSA upto 1.2 by 2016 at which time he had biopsy proven local recurrence with Kimper 3+4 disease s/p radiation on 4/2016 followed by an undetectable PSA atleast until 2018.  His repeat PSA 5/2022 was < 0.1. PET scan showed carcinomatosis. Started palliative Gemzar and Abraxane. Here for C3 D1  Had scans     He has had a good appetite. No pain, he has not noted a change in the periumbilical       Comes with son and wife- Juanita Segundo. Past Medical History:   Diagnosis Date    Claudication [facility])     Constipation 10/22/2012    Glaucoma     Hypercholesterolemia     Hypertension     Pancreatitis 10/2012    Prostate cancer [facility])     s/p prostatectomy    Thrombocytopenia ([address] 7/27/2010      Past Surgical History:   Procedure Laterality Date    ENDOSCOPY, COLON, DIAGNOSTIC  1999    Normal; Dr. Yung Nelson  04/2017    HX ORTHOPAEDIC      right wrist surgery    HX OTHER SURGICAL      torn cartridge in L knee    HX PROSTATECTOMY  2005    IR INSERT TUNL CVC W PORT OVER 5 YEARS  6/3/2022      Social History     Tobacco Use    Smoking status: Passive Smoke Exposure - Never Smoker    Smokeless tobacco: Never   Substance Use Topics    Alcohol use: No     Comment: ocassionally      Family History   Problem Relation Age of Onset    Cancer Mother     Hypertension Mother     Hypertension Brother     Heart Disease Father     Hypertension Brother      Current Outpatient Medications   Medication Sig    ondansetron (ZOFRAN ODT) 8 mg disintegrating tablet Take 1 Tablet by mouth every eight (8) hours as needed for Nausea or Vomiting.    lidocaine-prilocaine (EMLA) topical cream Apply  to affected area as needed for Pain or PRN Reason (Other) (port access). Apply a thin layer to port site 30-60 minutes prior to arrival for chemotherapy treatments    lipase-protease-amylase (Creon) 36,000-114,000- 180,000 unit cpDR capsule Take 8 Capsules by mouth daily as needed (based on size meals and snacks). Take 2 caps with first bite of full meals and 1 with first bite of snacks.     lisinopril-hydroCHLOROthiazide (PRINZIDE, ZESTORETIC) 20-25 mg per tablet TAKE 1 TABLET TWICE DAILY    atorvastatin (LIPITOR) 40 mg tablet TAKE 1 TABLET EVERY NIGHT    verapamil ER (CALAN-SR) 240 mg CR tablet TAKE 1 TABLET EVERY DAY    PSYLLIUM SEED, WITH SUGAR, (METAMUCIL, SUGAR, PO) Take  by mouth. senna (SENNA) 8.6 mg tablet Take 1 Tab by mouth daily. MULTIVITAMINS (MULTIVITAMIN PO) Take  by mouth. No current facility-administered medications for this visit. Facility-Administered Medications Ordered in Other Visits   Medication Dose Route Frequency    sodium chloride (NS) flush 5-40 mL  5-40 mL IntraVENous PRN    0.9% sodium chloride injection 5-40 mL  5-40 mL IntraVENous PRN    0.9% sodium chloride infusion  5-250 mL/hr IntraVENous PRN    heparin (porcine) pf 500 Units  500 Units InterCATHeter PRN    alteplase (CATHFLO) 2 mg in sterile water (preservative free) 2 mL injection  2 mg InterCATHeter PRN      Allergies   Allergen Reactions    Shellfish Derived Swelling    Zoloft [Sertraline] Other (comments)     Insomnia        Review of Systems: A complete review of systems was obtained, negative except as described above. Physical Exam:     Visit Vitals  BP (!) 124/58 (BP 1 Location: Left upper arm, BP Patient Position: Sitting)   Pulse 76   Temp 97.8 °F (36.6 °C)   Resp 18   Wt 145 lb (65.8 kg)   SpO2 99%   BMI 20.81 kg/m²       ECOG PS: 1  General: No distress  Eyes: PERRLA, anicteric sclerae  HENT: Atraumatic  Neck: Supple  Lymphatic: No cervical, supraclavicular, or inguinal adenopathy  Respiratory: normal respiratory effort  CV: Normal rate, no peripheral edema  GI: non distended, Supraumbilical palpable ill defined non tender area , firm, perhaps 2 cm   Psych: Alert, oriented, appropriate affect, normal judgment/insight    Results:     Lab Results   Component Value Date/Time    WBC 4.6 08/07/2022 02:34 PM    HGB 11.8 (L) 08/07/2022 02:34 PM    HCT 34.3 (L) 08/07/2022 02:34 PM    PLATELET 510 91/13/6648 02:34 PM    MCV 92.7 08/07/2022 02:34 PM    ABS.  NEUTROPHILS 3.3 08/07/2022 02:34 PM     Lab Results Component Value Date/Time    Sodium 131 (L) 08/07/2022 02:34 PM    Potassium 3.4 (L) 08/07/2022 02:34 PM    Chloride 96 (L) 08/07/2022 02:34 PM    CO2 27 08/07/2022 02:34 PM    Glucose 164 (H) 08/07/2022 02:34 PM    BUN 20 08/07/2022 02:34 PM    Creatinine 1.10 08/07/2022 02:34 PM    GFR est AA >60 08/07/2022 02:34 PM    GFR est non-AA >60 08/07/2022 02:34 PM    Calcium 9.7 08/07/2022 02:34 PM    Glucose (POC) 141 (H) 05/19/2022 10:45 AM     Lab Results   Component Value Date/Time    Bilirubin, total 1.0 08/07/2022 02:34 PM    ALT (SGPT) 24 08/07/2022 02:34 PM    Alk. phosphatase 70 08/07/2022 02:34 PM    Protein, total 6.8 08/07/2022 02:34 PM    Albumin 3.5 08/07/2022 02:34 PM    Globulin 3.3 08/07/2022 02:34 PM       Lab Results   Component Value Date/Time    Lipase 53 12/03/2016 08:52 AM       Lab Results   Component Value Date/Time    INR 1.0 04/05/2010 08:43 AM    aPTT 29.3 04/05/2010 08:43 AM    D-dimer 2.51 (H) 08/07/2022 02:34 PM     Prostate Specific Ag   Date Value   05/16/2022 <0.1 ng/mL   04/04/2017 <0.1 ng/mL   02/23/2012 0.7 ng/mL   10/13/2009 0.4 NG/ML     CA 19-9:  Recent Labs     05/16/22  0906   C199LT <2       Records reviewed and summarized above. Pathology report(s) reviewed above. Radiology report(s) reviewed above. 1. There are 2 pancreatic mass lesions suspicious for neoplasm with features  suggestive of adenocarcinoma with lesion in the pancreatic body abutting the  celiac axis and potentially occluding left gastric artery and likely responsible  for 5 mm dilation of pancreatic duct into the pancreatic tail. 2. 1.4 x 1.7 cm left periaortic lymph node and 1.1 x 1.6 cm right common iliac  node concerning for metastatic neoplasm, possibly prostatic in this patient with  history of prostate cancer and prostatectomy. 3. No MRI evidence of other metastatic disease    PET CT 5/19/2022    IMPRESSION  1. RML lesion, indeterminate. 2. LLL punctate nodule, indeterminate.   3. Two pancreatic masses. 4. Two abdominal midline wall tumor deposits. 5. Metastatic retroperitoneal nodes. 6. Peritoneal tumor implants. 7/28/2022 CT WO CONTRAST    IMPRESSION     1.  2 separate pancreatic masses as noted previously which are not significantly  changed in size. 2.  Multiple pulmonary nodules as described. The larger lesions are unchanged. Smaller lesions may been present on the head CT but too difficult to visualize  given the resolution of the CT images. Attention on follow-up imaging. 3.  Increased size of prevascular lymph node anterior to the aortic bifurcation  consistent with progression of disease. 4.  Questionable new hypodensity in the liver. If clinically indicated, CT or  MRI with contrast should be obtained. 5.  Sclerotic lesion in the right iliac bone unchanged. This was not  metabolically active on the prior PET/CT and is of uncertain clinical  significance. CT with contrast repeated 8/11/2022     IMPRESSION  Metastatic pancreatic malignancy with stable small nodules at the lung bases,  multiple liver metastases, multiple peritoneal and abdominal wall metastases,  left para-aortic retroperitoneal metastasis    Assessment:   1) Pancreatic masses-Adenocarcinoma- stage IV   Ambry genetics- negative     Presented to with epigastric pain. Noted to have 2 adjustment masses in the pancreatic body and tail measuring about 3.6 to 3.9 cm. Mass is about the celiac plexus and splenic artery. Scan showed a 1.4 x 1.7 cm left periaortic lymph node as well as a 1.1 x 1.6 cm right common iliac node. PET CT reviewed and noted tumor deposits, metastatic RP nodes and peritoneal carcinomatosis    Consistent with pancreatic cancer, stage IV  Reviewed in tumor board. Discussed scans and path. Discussed that management is palliative. He is noted to have a long history of thrombocytopenia which may limit doses  On palliative Gemzar+ Abraxane . Tolerating well.  Scans prior to C3 were reviewed from 7/2022  These were WO contrast has his GFR had declined  Repeated with contrast 8/2022 and reviewed personally today. This is consistent with disease progression, there are new liver metastasis. We discussed palliative systemic treatment with FOLFOX . Discussed that disease is aggressive and there is a risk of continued disease progression  We reviewed the importance of having clear goals and also following with palliative care  NGS was ordered and is pending  Will follow the palpable omental lesion     We discussed the chemotherapy regimen, it's logistics, and potential toxicities in detail. Potential side effects include, but are not limited to, nausea, vomiting, diarrhea, taste changes, myelosuppression, infection, fatigue, allergic reactions, rash, edema, neuropathy, and rarely, death. The patient asked several well thought out questions which I answered to the best of my ability and to their apparent satisfaction. The patient has given consent for chemotherapy. 2) history of prostate cancer  Chili 3+3 status post prostatectomy 2005  Recurrent disease Jerri 3+4 in 2016 status postradiation with undetectable PSA    3) Epigastric pain  Well controlled on Oxycodone  He did not tolerate creon as he had a lot of cramping and diarrhea  Will hold off on resuming unless he starts loosing weight    4) thrombocytopenia  External notes from [facility] were reviewed. It is noted that he had an extensive work-up including a bone marrow biopsy that showed normal number of megakaryocytes. Presumed to be ITP. Counts have been around 60,000 and has not required treatment in the past.    5) RENETTA and dehydration  Improved     6) Encounter for high risk disease disease     Disease progression  Plan:     Switch tp FOLFOX- No bolus , every 2 weeks until progression  Antiemetics per protocol  Dexamethasone on days 2 and 3   Oxycodone prn pain.  OK to alternate with Tylenol  Creon - HOLD   RD to follow   Tempus to path - PENDING  Refer to palliative care   RTC every 2 weeks       I appreciate the opportunity to participate in Mr. Carrillo Partida 's care.     Signed By: Marc Sutton MD

## 2022-08-16 NOTE — PROGRESS NOTES
Pharmacy Note- Chemotherapy Education    Alexi Feliz Sr. is a  67 y. o.male  diagnosed with pancreatic cancer here today for Cycle 1, Day 1 chemotherapy counseling and consent. Mr. Randy Schneider is being treated with mFOLFOX. Provided education on oxaliplatin, fluorouracil and premedications - palonosetron and dexamethasone. Provided Mr. Randy Schneider with a handout and reviewed home supportive care regimen in detail. Side effects of chemotherapy reviewed included s/s infection, anemia, appetite changes, thrombocytopenia, fatigue, hair loss/alopecia, bone pain, skin and nail changes, diarrhea/constipation and nerve sensitivities (peripheral neuropathy/cold sensitivity). Patient given ways to manage these side effects and when to contact office. [address] Handout of medications provided to patient. Mr. Randy Schneider verbalized understanding of the information presented and all of the patient's questions were answered.     Geraldo Cardoza, PharmD, [facility], [address] [place] [address] in place: Yes  Recommendation Provided To: Patient/Caregiver: 1 via In person    Intervention Accepted By: Patient/Caregiver: 1  Time Spent (min): 20

## 2022-08-16 NOTE — PROGRESS NOTES
[facility] from [facility]. Connected patient to CADD pump and patient left with son with no signs of distress noted.

## 2022-08-18 NOTE — PROGRESS NOTES
[facility] Short Visit Note:    3064:  Pt arrived ambulatory and in no distress for Irrigation of VAD and Cadd pump removal   (pump ended at 1100) and tolerated well. Port flushed per protocol. D/Cd from [facility] ambulatory and in no distress. Next visit 8/30/22.     Visit Vitals  /74   Pulse (!) 117   Temp 97.7 °F (36.5 °C)   Resp 17      Medications Administered       0.9% sodium chloride injection 5-40 mL       Admin Date  08/18/2022 Action  Given Dose  10 mL Route  IntraVENous Administered By  Enedina Farmer RN              heparin (porcine) pf 500 Units       Admin Date  08/18/2022 Action  Given Dose  500 Units Route  InterCATHeter Administered By  Enedina Farmer RN

## 2022-08-18 NOTE — TELEPHONE ENCOUNTER
[employer] Palliative Medicine Office  Nursing Note  [phone]  Fax [phone]      Name:  Aaliyah Krishnamurthy. YOB: 1945    Received outpatient Palliative Medicine referral from Marc Sutton MD to see patient for symptom management and supportive care. Chart  reviewed. Carrillo Partida Sr. is a 67 y.o. male with pancreatic cancer with liver mets. .  Patient's most recent office visit with Dr. Maria Fernanda Ayala was on 8/16/22. See her note for complete HPI, treatment history, and plan. ACP:     No ACP documents on file    Nurse called patient and his wife answered the phone. She states that she schedules patient's appts. Nurse [facility] outpatient Palliative Medicine services. She says her  is not experiencing any symptoms now and she would like to wait to schedule a Palliative appointment. This nurse advised her that we have Palliative clinic at [facility] on Tuesdays and Thursdays and sometimes it takes 3-4 weeks to get a new patient appointment. She says she will call back when they want to schedule.     Boom Warren, CHRISTINEN, RN-BC, [facility]

## 2022-08-29 NOTE — PROGRESS NOTES
[facility] [place] at [address], [address] [place] [address] Road, [address] [place] [address]  W: [phone]  F: [phone]    Reason for Visit:   Alois Scheuermann. is a 67 y.o. male who is seen for Pancreatic adenocarcinoma - stage IV     Treatment History:   6/7/2022- Gemzar and abraxane   8/2022: CT with progression  8/16/2022: FOLFOX    History of Present Illness:   Patient is a 67 y.o. male seen for above    Presented with 3 weeks of abdominal pain 4/19/2022. He was started on Cipro flagyl with some improvement but not complete resolution. He then had a CT scan which showed hypoechoic masses in the pancreas with infiltration of the celiac axis. He had an MRI abdomen on 4/28/2022 which showed a 2.8 x 3.9 x 2.7 cm mass and in the tail of the pancreas there is 2.3 x 3.6 x 2.3 cm mass abutting the celiac axis and potentially occluding left gastric artery and likely responsible for 5 mm dilation of pancreatic duct into the pancreatic tail. Also noted were 1.4 x 1.7 cm left periaortic lymph node and 1.1 x 1.6 cm right common iliac node concerning for metastatic neoplasm. EUS on 5/6/2022 showed  2 masses in the pancreas, 1 and the body of the pancreas and the second in the tail of the pancreas. Mass in the tail of the pancreas was heterogeneous, irregular and measured about 37 mm x 29 mm in size. The mass was abutting the splenic artery. Mass in the body of the pancreas measured about 36 mm x 32 mm in size. Mass was heterogeneous, irregular and was abutting the celiac axis. Biopsies from both revealed adenocarcinoma. He has a long standing history of ITP ( Work up included a BM bx in 2010). He has seen Dr. Kierra Conley for this previously. He has a h/o [place] 3+3 prostate cancer s/p prostatectomy in 2005. Had a biochemical recurrence with PSA upto 1.2 by 2016 at which time he had biopsy proven local recurrence with [place] 3+4 disease s/p radiation on 4/2016 followed by an undetectable PSA atleast until 2018.  His repeat PSA 5/2022 was < 0.1. PET scan showed carcinomatosis. Started palliative Gemzar and Abraxane. CT 8/2022 showed progression of disease hence he was switched to FOLFOX. He comes today for cycle 2 of FOLFOX. Has some fatigue. Denies nausea/vomiting. No mouth sores. Has cold sensitivity but no lingering neuropathy. Had one episode of diarrhea and took imodium and this resolved. He has no pain. Feels periumbilical mass has gotten smaller. Reports some dysuria for the last couple of days. No bleeding. No fevers/chills. No other complaints. Comes with son and wife- Marlene Montemayor. Past Medical History:   Diagnosis Date    Claudication [facility])     Constipation 10/22/2012    Glaucoma     Hypercholesterolemia     Hypertension     Pancreatitis 10/2012    Prostate cancer [facility])     s/p prostatectomy    Thrombocytopenia ([address] 7/27/2010      Past Surgical History:   Procedure Laterality Date    ENDOSCOPY, COLON, DIAGNOSTIC  1999    Normal; Dr. Eileen Wylie  04/2017    HX ORTHOPAEDIC      right wrist surgery    HX OTHER SURGICAL      torn cartridge in L knee    HX PROSTATECTOMY  2005    IR INSERT TUNL CVC W PORT OVER 5 YEARS  6/3/2022      Social History     Tobacco Use    Smoking status: Passive Smoke Exposure - Never Smoker    Smokeless tobacco: Never   Substance Use Topics    Alcohol use: No     Comment: ocassionally      Family History   Problem Relation Age of Onset    Cancer Mother     Hypertension Mother     Hypertension Brother     Heart Disease Father     Hypertension Brother      Current Outpatient Medications   Medication Sig    dexAMETHasone (DECADRON) 4 mg tablet Take 2 tabs (8mg) once daily on days 2 & 3 of chemotherapy    prochlorperazine (Compazine) 5 mg tablet Take 1 Tablet by mouth every six (6) hours as needed for Nausea or Vomiting. ondansetron (ZOFRAN ODT) 8 mg disintegrating tablet Take 1 Tablet by mouth every eight (8) hours as needed for Nausea or Vomiting. lidocaine-prilocaine (EMLA) topical cream Apply  to affected area as needed for Pain or PRN Reason (Other) (port access). Apply a thin layer to port site 30-60 minutes prior to arrival for chemotherapy treatments    lipase-protease-amylase (Creon) 36,000-114,000- 180,000 unit cpDR capsule Take 8 Capsules by mouth daily as needed (based on size meals and snacks). Take 2 caps with first bite of full meals and 1 with first bite of snacks. lisinopril-hydroCHLOROthiazide (PRINZIDE, ZESTORETIC) 20-25 mg per tablet TAKE 1 TABLET TWICE DAILY    atorvastatin (LIPITOR) 40 mg tablet TAKE 1 TABLET EVERY NIGHT    verapamil ER (CALAN-SR) 240 mg CR tablet TAKE 1 TABLET EVERY DAY    PSYLLIUM SEED, WITH SUGAR, (METAMUCIL, SUGAR, PO) Take  by mouth. senna (SENNA) 8.6 mg tablet Take 1 Tab by mouth daily. MULTIVITAMINS (MULTIVITAMIN PO) Take  by mouth. No current facility-administered medications for this visit. Facility-Administered Medications Ordered in Other Visits   Medication Dose Route Frequency    sodium chloride (NS) flush 5-40 mL  5-40 mL IntraVENous PRN    0.9% sodium chloride injection 5-40 mL  5-40 mL IntraVENous PRN    0.9% sodium chloride infusion  5-250 mL/hr IntraVENous PRN    heparin (porcine) pf 500 Units  500 Units InterCATHeter PRN    alteplase (CATHFLO) 2 mg in sterile water (preservative free) 2 mL injection  2 mg InterCATHeter PRN      Allergies   Allergen Reactions    Shellfish Derived Swelling    Zoloft [Sertraline] Other (comments)     Insomnia        Review of Systems: A complete review of systems was obtained, negative except as described above.     Physical Exam:     Visit Vitals  BP 99/64 (BP 1 Location: Left upper arm, BP Patient Position: Sitting)   Pulse (!) 105   Temp 97.1 °F (36.2 °C)   Resp 18   Wt 140 lb (63.5 kg)   SpO2 100%   BMI 20.09 kg/m²       ECOG PS: 1  General: No distress  Eyes: PERRL, anicteric sclerae  HENT: Atraumatic  Neck: Supple  Lymphatic: No cervical, supraclavicular, or inguinal adenopathy  Respiratory: normal respiratory effort  CV: Normal rate, no peripheral edema  GI: non distended, Supraumbilical palpable ill defined non tender area , firm, perhaps 2 cm   Psych: Alert, oriented, appropriate affect, normal judgment/insight    Results:     Lab Results   Component Value Date/Time    WBC 10.0 08/30/2022 09:18 AM    HGB 11.1 (L) 08/30/2022 09:18 AM    HCT 31.7 (L) 08/30/2022 09:18 AM    PLATELET 217 29/10/3875 09:18 AM    MCV 91.6 08/30/2022 09:18 AM    ABS. NEUTROPHILS 7.3 08/30/2022 09:18 AM     Lab Results   Component Value Date/Time    Sodium 133 (L) 08/16/2022 09:12 AM    Potassium 3.5 08/16/2022 09:12 AM    Chloride 99 08/16/2022 09:12 AM    CO2 28 08/16/2022 09:12 AM    Glucose 110 (H) 08/16/2022 09:12 AM    BUN 22 (H) 08/16/2022 09:12 AM    Creatinine 1.15 08/16/2022 09:12 AM    GFR est AA >60 08/16/2022 09:12 AM    GFR est non-AA >60 08/16/2022 09:12 AM    Calcium 9.1 08/16/2022 09:12 AM    Glucose (POC) 141 (H) 05/19/2022 10:45 AM     Lab Results   Component Value Date/Time    Bilirubin, total 0.4 08/16/2022 09:12 AM    ALT (SGPT) 20 08/16/2022 09:12 AM    Alk. phosphatase 71 08/16/2022 09:12 AM    Protein, total 6.1 (L) 08/16/2022 09:12 AM    Albumin 3.4 (L) 08/16/2022 09:12 AM    Globulin 2.7 08/16/2022 09:12 AM       Lab Results   Component Value Date/Time    Lipase 53 12/03/2016 08:52 AM       Lab Results   Component Value Date/Time    INR 1.0 04/05/2010 08:43 AM    aPTT 29.3 04/05/2010 08:43 AM    D-dimer 2.51 (H) 08/07/2022 02:34 PM     Prostate Specific Ag   Date Value   05/16/2022 <0.1 ng/mL   04/04/2017 <0.1 ng/mL   02/23/2012 0.7 ng/mL   10/13/2009 0.4 NG/ML     CA 19-9:  Recent Labs     08/16/22  0912 05/16/22  0906   C199LT 6 <2       Records reviewed and summarized above. Pathology report(s) reviewed above. Radiology report(s) reviewed above.     1. There are 2 pancreatic mass lesions suspicious for neoplasm with features  suggestive of adenocarcinoma with lesion in the pancreatic body abutting the  celiac axis and potentially occluding left gastric artery and likely responsible  for 5 mm dilation of pancreatic duct into the pancreatic tail. 2. 1.4 x 1.7 cm left periaortic lymph node and 1.1 x 1.6 cm right common iliac  node concerning for metastatic neoplasm, possibly prostatic in this patient with  history of prostate cancer and prostatectomy. 3. No MRI evidence of other metastatic disease    PET CT 5/19/2022    IMPRESSION  1. RML lesion, indeterminate. 2. LLL punctate nodule, indeterminate. 3. Two pancreatic masses. 4. Two abdominal midline wall tumor deposits. 5. Metastatic retroperitoneal nodes. 6. Peritoneal tumor implants. 7/28/2022 CT WO CONTRAST    IMPRESSION     1.  2 separate pancreatic masses as noted previously which are not significantly  changed in size. 2.  Multiple pulmonary nodules as described. The larger lesions are unchanged. Smaller lesions may been present on the head CT but too difficult to visualize  given the resolution of the CT images. Attention on follow-up imaging. 3.  Increased size of prevascular lymph node anterior to the aortic bifurcation  consistent with progression of disease. 4.  Questionable new hypodensity in the liver. If clinically indicated, CT or  MRI with contrast should be obtained. 5.  Sclerotic lesion in the right iliac bone unchanged. This was not  metabolically active on the prior PET/CT and is of uncertain clinical  significance. CT with contrast repeated 8/11/2022     IMPRESSION  Metastatic pancreatic malignancy with stable small nodules at the lung bases,  multiple liver metastases, multiple peritoneal and abdominal wall metastases,  left para-aortic retroperitoneal metastasis    Assessment:   1) Pancreatic masses-Adenocarcinoma- stage IV   Ambry genetics- negative     Presented to with epigastric pain.   Noted to have 2 adjustment masses in the pancreatic body and tail measuring about 3.6 to 3.9 cm. Mass is about the celiac plexus and splenic artery. Scan showed a 1.4 x 1.7 cm left periaortic lymph node as well as a 1.1 x 1.6 cm right common iliac node. PET CT reviewed and noted tumor deposits, metastatic RP nodes and peritoneal carcinomatosis    Consistent with pancreatic cancer, stage IV  Reviewed in tumor board. Discussed scans and path. Discussed that management is palliative. He is noted to have a long history of thrombocytopenia which may limit doses  On palliative Gemzar+ Abraxane . Tolerating well. Scans prior to C3 were reviewed from 7/2022  These were WO contrast has his GFR had declined  Repeated with contrast 8/2022 and shows disease progression & new liver metastasis. Started palliative FOLFOX 8/2022. Today is cycle 2. NGS was ordered and is pending  Will follow the palpable omental lesion     2) history of prostate cancer  [place] 3+3 status post prostatectomy 2005  Recurrent disease [place] 3+4 in 2016 status postradiation with undetectable PSA    3) Epigastric pain  Well controlled on Oxycodone  He did not tolerate creon as he had a lot of cramping and diarrhea  Will hold off on resuming unless he starts loosing weight    4) thrombocytopenia  External notes from [facility] were reviewed. It is noted that he had an extensive work-up including a bone marrow biopsy that showed normal number of megakaryocytes. Presumed to be ITP. Counts have been around 60,000 and has not required treatment in the past.    5) RENETTA and dehydration  Improved     6) Dysuria  UA pending     7) Encounter for high risk medications like chemotherapy  CBC reviewed, CMP pending   Plan:     Proceed today with cycle 2 of FOLFOX- No bolus, 5FU CADD 2400mg/m2, oxaliplatin 85mg/m2) , every 2 weeks until progression  Labs to include CBC with diff, CMP, CA 19-9 monthly   Dexamethasone on days 2 and 3   Zofran / Compazine PRN   Oxycodone prn pain.  OK to alternate with Tylenol  Creon - HOLD RD to follow   Tempus to path - PENDING  Refer to palliative care - pt refusing at this time   UA today     RTC in 2 weeks for cycle 3     I appreciate the opportunity to participate in Mr. Gary Drew 's care. I personally saw and evaluated the patient and performed the key components of medical decision making. The history, physical exam, and documentation were performed by Skyler Beltran NP. I reviewed and verified the above documentation and modified it as needed.     In addition the periumbilical nodule is smaller and softer      Signed By: Khloe Rios MD

## 2022-08-30 NOTE — PROGRESS NOTES
Nik Cruz. is a 67 y.o. male    Chief Complaint   Patient presents with    Follow-up      Pancreatic adenocarcinoma - stage IV        1. Have you been to the ER, urgent care clinic since your last visit? Hospitalized since your last visit? No    2. Have you seen or consulted any other health care providers outside of the [address] [place] [address] since your last visit? Include any pap smears or colon screening.  No

## 2022-08-30 NOTE — PROGRESS NOTES
[facility] Chemo Progress Note    Date: August 30, 2022        0905: Mr. Ag Bond Arrived to [facility] for  C2 mFolfox ambulatory in stable condition. Assessment was completed and port accessed by Stan. Labs drawn and sent for processing. Went to provider appointment with Medical Oncology. [address] Criteria for treatment was met. Mr. Manuelito Ricardo vitals were reviewed. Patient Vitals for the past 12 hrs:   Temp Pulse Resp BP   08/30/22 1422 -- 80 -- (!) 95/53   08/30/22 0909 97.1 °F (36.2 °C) (!) 105 18 99/64         Lab results were obtained and reviewed. Recent Results (from the past 12 hour(s))   CBC WITH AUTOMATED DIFF    Collection Time: 08/30/22  9:18 AM   Result Value Ref Range    WBC 10.0 4.1 - 11.1 K/uL    RBC 3.46 (L) 4.10 - 5.70 M/uL    HGB 11.1 (L) 12.1 - 17.0 g/dL    HCT 31.7 (L) 36.6 - 50.3 %    MCV 91.6 80.0 - 99.0 FL    MCH 32.1 26.0 - 34.0 PG    MCHC 35.0 30.0 - 36.5 g/dL    RDW 16.4 (H) 11.5 - 14.5 %    PLATELET 179 234 - 159 K/uL    MPV 10.3 8.9 - 12.9 FL    NRBC 0.0 0  WBC    ABSOLUTE NRBC 0.00 0.00 - 0.01 K/uL    NEUTROPHILS 72 32 - 75 %    LYMPHOCYTES 15 12 - 49 %    MONOCYTES 9 5 - 13 %    EOSINOPHILS 2 0 - 7 %    BASOPHILS 1 0 - 1 %    IMMATURE GRANULOCYTES 1 (H) 0.0 - 0.5 %    ABS. NEUTROPHILS 7.3 1.8 - 8.0 K/UL    ABS. LYMPHOCYTES 1.5 0.8 - 3.5 K/UL    ABS. MONOCYTES 0.9 0.0 - 1.0 K/UL    ABS. EOSINOPHILS 0.2 0.0 - 0.4 K/UL    ABS. BASOPHILS 0.1 0.0 - 0.1 K/UL    ABS. IMM.  GRANS. 0.1 (H) 0.00 - 0.04 K/UL    DF AUTOMATED     METABOLIC PANEL, COMPREHENSIVE    Collection Time: 08/30/22  9:18 AM   Result Value Ref Range    Sodium 132 (L) 136 - 145 mmol/L    Potassium 3.6 3.5 - 5.1 mmol/L    Chloride 98 97 - 108 mmol/L    CO2 28 21 - 32 mmol/L    Anion gap 6 5 - 15 mmol/L    Glucose 180 (H) 65 - 100 mg/dL    BUN 28 (H) 6 - 20 MG/DL    Creatinine 1.13 0.70 - 1.30 MG/DL    BUN/Creatinine ratio 25 (H) 12 - 20      GFR est AA >60 >60 ml/min/1.73m2    GFR est non-AA >60 >60 ml/min/1.73m2    Calcium 9.1 8.5 - 10.1 MG/DL    Bilirubin, total 0.4 0.2 - 1.0 MG/DL    ALT (SGPT) 21 12 - 78 U/L    AST (SGOT) 12 (L) 15 - 37 U/L    Alk. phosphatase 75 45 - 117 U/L    Protein, total 6.0 (L) 6.4 - 8.2 g/dL    Albumin 3.4 (L) 3.5 - 5.0 g/dL    Globulin 2.6 2.0 - 4.0 g/dL    A-G Ratio 1.3 1.1 - 2.2     URINALYSIS W/ REFLEX CULTURE    Collection Time: 08/30/22  9:19 AM    Specimen: Urine   Result Value Ref Range    Color YELLOW/STRAW      Appearance CLOUDY (A) CLEAR      Specific gravity 1.022 1.003 - 1.030      pH (UA) 5.5 5.0 - 8.0      Protein 30 (A) NEG mg/dL    Glucose Negative NEG mg/dL    Ketone TRACE (A) NEG mg/dL    Bilirubin Negative NEG      Blood SMALL (A) NEG      Urobilinogen 1.0 0.2 - 1.0 EU/dL    Nitrites Negative NEG      Leukocyte Esterase LARGE (A) NEG      UA:UC IF INDICATED URINE CULTURE ORDERED (A) CNI      WBC >100 (H) 0 - 4 /hpf    RBC 10-20 0 - 5 /hpf    Epithelial cells FEW FEW /lpf    Bacteria Negative NEG /hpf    Hyaline cast 2-5 0 - 5 /lpf         Pre-medications  were administered as ordered and chemotherapy was initiated.   Medications Administered       0.9% sodium chloride injection 5-40 mL       Admin Date  08/30/2022 Action  Given Dose  10 mL Route  IntraVENous Administered By  Madison Torres RN              dexamethasone (DECADRON) 12 mg in 0.9% sodium chloride 50 mL, overfill volume 5 mL IVPB       Admin Date  08/30/2022 Action  New Bag Dose  12 mg Rate  232 mL/hr Route  IntraVENous Administered By  Oseas Hutchison RN              dextrose 5% infusion       Admin Date  08/30/2022 Action  New Bag Dose  25 mL/hr Rate  25 mL/hr Route  IntraVENous Administered By  Oseas Hutchison RN              fluorouraciL (ADRUCIL) 4,368 mg in 0.9% sodium chloride 100 mL CADD Cassette       Admin Date  08/30/2022 Action  New Bag Dose  4,368 mg Rate  2.2 mL/hr Route  IntraVENous Administered By  Oseas Hutchison RN              oxaliplatin (ELOXATIN) 154.5 mg in dextrose 5% 250 mL, overfill volume 25 mL chemo infusion       Admin Date  08/30/2022 Action  New Bag Dose  154.5 mg Rate  153 mL/hr Route  IntraVENous Administered By  Rajesh Fisher RN              palonosetron HCl (ALOXI) injection 0.25 mg       Admin Date  08/30/2022 Action  Given Dose  0.25 mg Route  IntraVENous Administered By  Rajesh Fisher RN              sodium chloride (NS) flush 5-40 mL       Admin Date  08/30/2022 Action  Given Dose  10 mL Route  IntraVENous Administered By  Merlin Downer, RN               Admin Date  08/30/2022 Action  Given Dose  10 mL Route  IntraVENous Administered By  Rajesh Fisher RN                      Two nurses verified prior to administering: Drug name, Drug dose, Infusion volume or drug volume when prepared in a syringe, Rate of administration, Route of administration, Expiration dates and/or times, Appearance and physical integrity of the drugs, Rate set on infusion pump, when used, and Sequencing of drug administration. 1425 Patient tolerated treatment well. Port maintained positive blood return throughout treatment. Port flushed, and connect to CADD continuous pump Patient was discharged in stable condition.  Patient is aware of next scheduled [facility] appointment on   Future Appointments   Date Time Provider Isauro Arenas   9/1/2022  5:00 PM G1 MATHEUS [address]   9/13/2022  9:00 AM E1 [address]   9/13/2022  9:15 AM Romain Grijalva  [address] BS AMB   9/15/2022  3:30 PM H1 MATHEUS FASTRACK [facility]   9/27/2022  9:00 AM E1 [place]HEUS [place] [facility]   9/29/2022  3:30 PM H1 MATHEUS FASTRACK [facility] [address] [facility]         Kathy Kendall, RICH, RN  August 30, 2022

## 2022-08-31 NOTE — TELEPHONE ENCOUNTER
[address] [place] [address] [place] pt HIPAA verified x2. Informed pt per Deanna Dick NP that he has a UTI and she has sent a script to [facility] for him to take for the next 5 days. Pt voiced understanding and has no questions or concerns at this time.

## 2022-09-06 PROBLEM — K56.609 SBO (SMALL BOWEL OBSTRUCTION) (HCC): Status: ACTIVE | Noted: 2022-01-01

## 2022-09-06 NOTE — ED PROVIDER NOTES
The history is provided by the patient and a relative. Nausea   This is a new problem. The current episode started 6 to 12 hours ago. The problem occurs 2 to 4 times per day. The problem has not changed since onset. The emesis has an appearance of stomach contents. There has been no fever. Pertinent negatives include no chills, no fever, no sweats, no abdominal pain, no diarrhea, no headaches, no arthralgias, no myalgias, no cough, no URI and no headaches. His pertinent negatives include no irritable bowel syndrome, no inflammatory bowel disease, no short gut syndrome, no bowel resection, no recent abdominal surgery, no malabsorption,  and no DM. Past Medical History:   Diagnosis Date    Claudication [facility])     Constipation 10/22/2012    Glaucoma     Hypercholesterolemia     Hypertension     Pancreatitis 10/2012    Prostate cancer ([facility] [address]     s/p prostatectomy    Thrombocytopenia ([facility] [address] 7/27/2010       Past Surgical History:   Procedure Laterality Date    ENDOSCOPY, COLON, DIAGNOSTIC  1999    Normal; Dr. Frank Caicedo [address]  04/2017    HX ORTHOPAEDIC      right wrist surgery    HX OTHER SURGICAL      torn cartridge in L knee    HX PROSTATECTOMY  2005    IR INSERT TUNL CVC W PORT OVER 5 YEARS  6/3/2022         Family History:   Problem Relation Age of Onset    Cancer Mother     Hypertension Mother     Hypertension Brother     Heart Disease Father     Hypertension Brother        Social History     Socioeconomic History    Marital status:      Spouse name: Not on file    Number of children: Not on file    Years of education: Not on file    Highest education level: Not on file   Occupational History    Not on file   Tobacco Use    Smoking status: Never     Passive exposure:  Yes    Smokeless tobacco: Never   Substance and Sexual Activity    Alcohol use: No     Comment: ocassionally    Drug use: No     Types: OTC, Prescription    Sexual activity: Not on file   Other Topics Concern    Not on file   Social History Narrative    Retired [employer] . Social Determinants of Health     Financial Resource Strain: Not on file   Food Insecurity: Not on file   Transportation Needs: Not on file   Physical Activity: Not on file   Stress: Not on file   Social Connections: Not on file   Intimate Partner Violence: Not on file   Housing Stability: Not on file         ALLERGIES: Shellfish derived and Zoloft [sertraline]    Review of Systems   Constitutional:  Negative for activity change, chills and fever. HENT:  Negative for nosebleeds, sore throat, trouble swallowing and voice change. Eyes:  Negative for visual disturbance. Respiratory:  Negative for cough and shortness of breath. Cardiovascular:  Negative for chest pain and palpitations. Gastrointestinal:  Positive for nausea and vomiting. Negative for abdominal pain, constipation and diarrhea. Genitourinary:  Negative for difficulty urinating, dysuria, hematuria and urgency. Musculoskeletal:  Negative for arthralgias, back pain, myalgias, neck pain and neck stiffness. Skin:  Negative for color change. Allergic/Immunologic: Negative for immunocompromised state. Neurological:  Negative for dizziness, seizures, syncope, weakness, light-headedness, numbness and headaches. Psychiatric/Behavioral:  Negative for behavioral problems, confusion, hallucinations, self-injury and suicidal ideas. Vitals:    09/06/22 1706   BP: 124/71   Pulse: (!) 122   Resp: 18   Temp: 97.6 °F (36.4 °C)   SpO2: 94%   Weight: 65.8 kg (145 lb)   Height: 5' 10\" (1.778 m)            Physical Exam  Vitals and nursing note reviewed. Constitutional:       General: He is not in acute distress. Appearance: He is well-developed. He is ill-appearing. He is not diaphoretic. HENT:      Head: Normocephalic and atraumatic. Eyes:      Pupils: Pupils are equal, round, and reactive to light.    Cardiovascular:      Rate and Rhythm: Regular rhythm. Tachycardia present. Heart sounds: Normal heart sounds. No murmur heard. No friction rub. No gallop. Pulmonary:      Effort: Pulmonary effort is normal. No respiratory distress. Breath sounds: Normal breath sounds. No wheezing. Abdominal:      General: Abdomen is flat. Bowel sounds are normal. There is no distension. Palpations: Abdomen is soft. There is mass. Tenderness: There is no abdominal tenderness. There is no guarding or rebound. Musculoskeletal:         General: Normal range of motion. Cervical back: Normal range of motion and neck supple. Skin:     General: Skin is warm. Findings: No rash. Neurological:      Mental Status: He is alert and oriented to person, place, and time. Psychiatric:         Behavior: Behavior normal.         Thought Content: Thought content normal.         Judgment: Judgment normal.        [facility]    ED Course as of 09/06/22 1927   Tue Sep 06, 2022   1715 EKG interpretation: (Preliminary)  Rhythm: sinus tachycardia; and regular . Rate (approx.): 118; Axis: normal; P wave: normal; QRS interval: normal ; ST/T wave: non-specific changes; Other findings: abnormal ekg   [FD]      ED Course User Index  [FD] Lorin Bhagat MD       Procedures    This is a 65-year-old male with past medical history, review of systems, physical exam as above, presenting with complaints of nausea and vomiting. Patient is currently undergoing chemotherapy for metastatic pancreatic cancer. He states he was at his usual state of health yesterday, however son at bedside notes decreased appetite. Patient with an episode of emesis this morning that seemed to improve with Zofran, however was not eating or drinking during the day today, after another episode of emesis this evening, they called EMS, who provided IV fluids and additional Zofran while in route.   Patient denies abdominal pain, chart review indicates his last CT scan was approximately 1 month ago.  He is normotensive, afebrile, tachycardic, satting well on room air. He has a soft abdomen with mass notable in the periumbilical region. Discussed with patient dehydration, electrolyte abnormality, obstructive process. We will continue IV fluids and antiemetics as needed, send CMP, CBC, UA, mag and obtain CT of the abdomen and pelvis. We will reassess, and make a disposition. Perfect Serve Consult for Admission  7:28 PM    ED Room Number: ER07/07  Patient Name and age:  Maicol Carrillo. 67 y.o.  male  Working Diagnosis:   1. SBO (small bowel obstruction) ([facility] [address]    2. Hyponatremia    3.  RENETTA (acute kidney injury) ([facility] [address]        COVID-19 Suspicion:  no  Sepsis present:  no  Reassessment needed: no  Code Status:  Full Code  Readmission: no  Isolation Requirements:  no  Recommended Level of Care:  telemetry  Department:[facility] Adult ED - 21   Other:  d/w Dr. Nieto Roles

## 2022-09-06 NOTE — PROGRESS NOTES
[facility]    Pt reviewed with Dr Jose Doherty. Pt has stage 4 pancreatic cancer with SBO. SBO may be mechanical or caused by mets, no obvious masses of the small bowel but may have carcinomatosis. We will start treatment with conservative measures with NGT, NPO and bowel rest.  NGT order placed. Hospitalists to admit pt. He is under going chemo tx now and not a good surgical candidate but it maybe possible if needed. He is being seen by Dr Carolynn Murrieta and may not be a bad idea to get Onc consult tomorrow. We will be following.     Sushila Dihel

## 2022-09-06 NOTE — ED TRIAGE NOTES
Patient in through ems with complaints of n/v x1 week with weakness. Pancreatic and liver cancer patient. Last chemo session was last Tuesday. Patient tachycardic en route and throwing up, was given 1 dose zofran with ems.

## 2022-09-07 NOTE — PROGRESS NOTES
[facility] Progress Note    INTERVAL HISTORY  / Subjective:  He has no abd pain, he is passing gas and belching    Assessment / Plan:   67 y.o. male with history of stage IV pancreatic cancer, history of prostate cancer, hypertension, dyslipidemia who presents to hospital with complaints of  Nausea and vomiting x1 week. .  Reports near daily bowel movements but has not had one in last 3 days. Aching, generalized, nonradiating abdominal pain which is since resolved since placement of NG tube. Associated nausea without emesis. The patient denies any fever, chills, chest pain,vomiting, cough, congestion, recent illness, palpitations, or dysuria. Remarkable vitals on ER Presentations: Heart rate to 122  Labs Remarkable for: WBC 14, sodium 127, glucose 202, creatinine 1.4  ER Images: New mid small bowel mechanical obstruction. Nonspecific colitis of the right  colon.        Small bowel obstruction  -Suspected mechanical obstruction in setting of pancreatic cancer  -Conservative management per surgery  -Keep NG tube  -Cautious volume resuscitation  -Monitor on telemetry-  -N.p.o.  -Remainder of management per surgery     Acute colitis  -Given immunocompromise state and leukocytosis, obtain blood cultures and start empiric Zosyn  -Send stool studies if obtainable     History of stage IV pancreatic cancer  -Oncology consult     Hypertension  -Hold BP lowering agents given labile Bps    RENETTA, Hyponatremia, and Dehydration  --Start IV LR 125CC/hr            NIDDM II  -SSI +Hypoglycemic protocols     Obesity  -Counseled on weight loss, dieting and exercise      Objective:  Visit Vitals  /78 (BP 1 Location: Left upper arm, BP Patient Position: At rest)   Pulse (!) 111   Temp 97.5 °F (36.4 °C)   Resp 18   Ht 5' 10\" (1.778 m)   Wt 65.8 kg (145 lb)   SpO2 96%   BMI 20.81 kg/m²                 Physical Exam:  General: Thin elderly appearing man   HEENT: Pupils equal and reactive to light and accommodation, oropharynx is clear   Neck: Supple, no lymphadenopathy, no JVD   Lungs: Clear to auscultation bilaterally   Cardiovascular: Regular rate and rhythm with normal S1 and S2   Abdomen: Soft, nontender, nondistended, quiet bowels sounds   Extremities: No cyanosis clubbing or edema   Neuro: Nonfocal, A&O x3   Psych: Normal affect     Intake and Output:  Date 09/06/22 0700 - 09/07/22 0659 09/07/22 0700 - 09/08/22 0659   Shift [id number] 1900-0659 24 Hour Total [id number] 1900-0659 24 Hour Total   INTAKE   Shift Total(mL/kg)         OUTPUT   Urine(mL/kg/hr)           Urine Occurrence(s) 1 x  1 x      Emesis/NG output    900  900     Output (ml) (Nasogastric Tube 09/06/22)    900  900   Shift Total(mL/kg)    900(13.7)  900(13.7)   NET    -900  -900   Weight (kg) 65.8 65.8 65.8 65.8 65.8 65.8       LAB:  Admission on 09/06/2022   Component Date Value    WBC 09/06/2022 14.0 (A)    RBC 09/06/2022 4.10     HGB 09/06/2022 13.3     HCT 09/06/2022 38.0     MCV 09/06/2022 92.7     MCH 09/06/2022 32.4     MCHC 09/06/2022 35.0     RDW 09/06/2022 16.0 (A)    PLATELET 32/87/6464 190     MPV 09/06/2022 10.5     NRBC 09/06/2022 0.3 (A)    ABSOLUTE NRBC 09/06/2022 0.04 (A)    NEUTROPHILS 09/06/2022 89 (A)    LYMPHOCYTES 09/06/2022 5 (A)    MONOCYTES 09/06/2022 5     EOSINOPHILS 09/06/2022 0     BASOPHILS 09/06/2022 0     IMMATURE GRANULOCYTES 09/06/2022 1 (A)    ABS. NEUTROPHILS 09/06/2022 12.5 (A)    ABS. LYMPHOCYTES 09/06/2022 0.7 (A)    ABS. MONOCYTES 09/06/2022 0.7     ABS. EOSINOPHILS 09/06/2022 0.0     ABS. BASOPHILS 09/06/2022 0.0     ABS. IMM.  GRANS. 09/06/2022 0.1 (A)    DF 09/06/2022 AUTOMATED     RBC COMMENTS 09/06/2022                      Value:ANISOCYTOSIS  1+      Sodium 09/06/2022 127 (A)    Potassium 09/06/2022 4.3     Chloride 09/06/2022 95 (A)    CO2 09/06/2022 22     Anion gap 09/06/2022 10     Glucose 09/06/2022 202 (A)    BUN 09/06/2022 35 (A)    Creatinine 09/06/2022 1.48 (A)    BUN/Creatinine ratio 09/06/2022 24 (A)    GFR est AA 09/06/2022 56 (A)    GFR est non-AA 09/06/2022 46 (A)    Calcium 09/06/2022 9.6     Bilirubin, total 09/06/2022 0.8     ALT (SGPT) 09/06/2022 39     AST (SGOT) 09/06/2022 18     Alk. phosphatase 09/06/2022 68     Protein, total 09/06/2022 6.7     Albumin 09/06/2022 3.5     Globulin 09/06/2022 3.2     A-G Ratio 09/06/2022 1.1     SAMPLES BEING HELD 09/06/2022 1GREEN 1BLUE 1RED     COMMENT 09/06/2022 Add-on orders for these samples will be processed based on acceptable specimen integrity and analyte stability, which may vary by analyte. Ventricular Rate 09/06/2022 118     Atrial Rate 09/06/2022 118     P-R Interval 09/06/2022 124     QRS Duration 09/06/2022 84     Q-T Interval 09/06/2022 330     QTC Calculation (Bezet) 09/06/2022 462     Calculated P Axis 09/06/2022 66     Calculated R Axis 09/06/2022 -21     Calculated T Axis 09/06/2022 54     Diagnosis 09/06/2022                      Value:Sinus tachycardia  Nonspecific ST and T wave abnormality  Abnormal ECG  When compared with ECG of 07-AUG-2022 14:26,  No significant change was found  Confirmed by Lyle Cheema M.D., Tiki Valentin ([id number]) on 9/7/2022 8:57:58 AM      Color 09/06/2022 YELLOW/STRAW     Appearance 09/06/2022 CLEAR     Specific gravity 09/06/2022 <1.005     pH (UA) 09/06/2022 5.5     Protein 09/06/2022 Negative     Glucose 09/06/2022 Negative     Ketone 09/06/2022 Negative     Bilirubin 09/06/2022 Negative     Blood 09/06/2022 Negative     Urobilinogen 09/06/2022 0.2     Nitrites 09/06/2022 Negative     Leukocyte Esterase 09/06/2022 Negative     WBC 09/06/2022 0-4     RBC 09/06/2022 0-5     Epithelial cells 09/06/2022 FEW     Bacteria 09/06/2022 Negative     Urine culture hold 09/06/2022 Urine on hold in Microbiology dept for 2 days. If unpreserved urine is submitted, it cannot be used for addtional testing after 24 hours, recollection will be required.      Lipase 09/06/2022 98     Magnesium 09/06/2022 1.9     Lactic Acid (POC) 09/06/2022 2.00     Special Requests: 09/06/2022 NO SPECIAL REQUESTS     Culture result: 09/06/2022 NO GROWTH AFTER 2 HOURS     Sodium 09/07/2022 130 (A)    Potassium 09/07/2022 4.3     Chloride 09/07/2022 97     CO2 09/07/2022 24     Anion gap 09/07/2022 9     Glucose 09/07/2022 157 (A)    BUN 09/07/2022 37 (A)    Creatinine 09/07/2022 1.68 (A)    BUN/Creatinine ratio 09/07/2022 22 (A)    GFR est AA 09/07/2022 48 (A)    GFR est non-AA 09/07/2022 40 (A)    Calcium 09/07/2022 9.4     Bilirubin, total 09/07/2022 1.1 (A)    ALT (SGPT) 09/07/2022 37     AST (SGOT) 09/07/2022 15     Alk. phosphatase 09/07/2022 65     Protein, total 09/07/2022 6.5     Albumin 09/07/2022 3.2 (A)    Globulin 09/07/2022 3.3     A-G Ratio 09/07/2022 1.0 (A)    WBC 09/07/2022 17.4 (A)    RBC 09/07/2022 4.00 (A)    HGB 09/07/2022 13.0     HCT 09/07/2022 37.2     MCV 09/07/2022 93.0     MCH 09/07/2022 32.5     MCHC 09/07/2022 34.9     RDW 09/07/2022 16.1 (A)    PLATELET 82/70/5723 410     MPV 09/07/2022 11.2     NRBC 09/07/2022 0.0     ABSOLUTE NRBC 09/07/2022 0.00     NEUTROPHILS 09/07/2022 89 (A)    LYMPHOCYTES 09/07/2022 4 (A)    MONOCYTES 09/07/2022 6     EOSINOPHILS 09/07/2022 0     BASOPHILS 09/07/2022 0     IMMATURE GRANULOCYTES 09/07/2022 1 (A)    ABS. NEUTROPHILS 09/07/2022 15.5 (A)    ABS. LYMPHOCYTES 09/07/2022 0.7 (A)    ABS. MONOCYTES 09/07/2022 1.0     ABS. EOSINOPHILS 09/07/2022 0.0     ABS. BASOPHILS 09/07/2022 0.0     ABS. IMM. GRANS. 09/07/2022 0.2 (A)    DF 09/07/2022 SMEAR SCANNED     RBC COMMENTS 09/07/2022                      Value:ANISOCYTOSIS  1+      SAMPLES BEING HELD 09/06/2022 4 BC BOTTLES     COMMENT 09/06/2022 Add-on orders for these samples will be processed based on acceptable specimen integrity and analyte stability, which may vary by analyte.      SAMPLES BEING HELD 09/07/2022 1RED,1BLU     COMMENT 09/07/2022 Add-on orders for these samples will be processed based on acceptable specimen integrity and analyte stability, which may vary by analyte. IMAGING:  XR ABD FLAT/ ERECT    Result Date: 9/7/2022  Enteric tube remains in position with no significant change in small bowel obstruction pattern     CT ABD PELV W CONT    Result Date: 9/6/2022  New mid small bowel mechanical obstruction. Nonspecific colitis of the right colon. No other significant change. Please refer to above findings for complete details     XR ABD PORT  1 V    Result Date: 9/6/2022  1. Gastric tube terminates in the proximal stomach with sidehole in the region of the GE junction. Recommend advancement. EKG:  No results found for this or any previous visit.           Coby Pantoja MD  9/7/2022 6:18 PM

## 2022-09-07 NOTE — ROUTINE PROCESS
Bedside shift change report given to sultana sal rn (oncoming nurse) by Caroleen Kehr (offgoing nurse). Report included the following information SBAR and Kardex.

## 2022-09-07 NOTE — CONSULTS
[id number] [facility] [address] Surgery Consultation for: SBO, pancreatic cancer    Requesting Physician: Dr Ralph Dos Santos    History of Present Illness:      Osito Manrique is a 67 y.o. male who has a history of stage IV pancreatic cancer. He started having nausea vomiting today and has not been able to keep anything down. He has not had a bowel movements in about 2 days. He was recently diagnosed in June with stage IV pancreatic cancer with mets to the lungs liver retroperitoneum and likely peritoneal carcinomatosis. He does not describe any abdominal pain at all. He last vomited a few hours ago. He has a past surgical history of robotic prostatectomy in 2005. Past Medical History:   Diagnosis Date    Claudication [facility])     Constipation 10/22/2012    Glaucoma     Hypercholesterolemia     Hypertension     Pancreatitis 10/2012    Prostate cancer [facility])     s/p prostatectomy    Thrombocytopenia ([address] 7/27/2010       Past Surgical History:   Procedure Laterality Date    ENDOSCOPY, COLON, DIAGNOSTIC  1999    Normal; Dr. Papito Arteaga  04/2017    HX ORTHOPAEDIC      right wrist surgery    HX OTHER SURGICAL      torn cartridge in L knee    HX PROSTATECTOMY  2005    IR INSERT TUNL CVC W PORT OVER 5 YEARS  6/3/2022       No current facility-administered medications for this encounter. Current Outpatient Medications:     dexAMETHasone (DECADRON) 4 mg tablet, Take 2 tabs (8mg) once daily on days 2 & 3 of chemotherapy, Disp: 30 Tablet, Rfl: 2    prochlorperazine (Compazine) 5 mg tablet, Take 1 Tablet by mouth every six (6) hours as needed for Nausea or Vomiting., Disp: 30 Tablet, Rfl: 3    ondansetron (ZOFRAN ODT) 8 mg disintegrating tablet, Take 1 Tablet by mouth every eight (8) hours as needed for Nausea or Vomiting., Disp: 60 Tablet, Rfl: 1    lidocaine-prilocaine (EMLA) topical cream, Apply  to affected area as needed for Pain or PRN Reason (Other) (port access).  Apply a thin layer to port site 30-60 minutes prior to arrival for chemotherapy treatments, Disp: 30 g, Rfl: 1    lipase-protease-amylase (Creon) 36,000-114,000- 180,000 unit cpDR capsule, Take 8 Capsules by mouth daily as needed (based on size meals and snacks). Take 2 caps with first bite of full meals and 1 with first bite of snacks. , Disp: 240 Capsule, Rfl: 3    lisinopril-hydroCHLOROthiazide (PRINZIDE, ZESTORETIC) 20-25 mg per tablet, TAKE 1 TABLET TWICE DAILY, Disp: 180 Tab, Rfl: 3    atorvastatin (LIPITOR) 40 mg tablet, TAKE 1 TABLET EVERY NIGHT, Disp: 90 Tab, Rfl: 3    verapamil ER (CALAN-SR) 240 mg CR tablet, TAKE 1 TABLET EVERY DAY, Disp: 90 Tab, Rfl: 3    PSYLLIUM SEED, WITH SUGAR, (METAMUCIL, SUGAR, PO), Take  by mouth., Disp: , Rfl:     senna (SENNA) 8.6 mg tablet, Take 1 Tab by mouth daily. , Disp: 30 Tab, Rfl: 11    MULTIVITAMINS (MULTIVITAMIN PO), Take  by mouth., Disp: , Rfl:     Allergies   Allergen Reactions    Shellfish Derived Swelling    Zoloft [Sertraline] Other (comments)     Insomnia       Social History     Socioeconomic History    Marital status:      Spouse name: Not on file    Number of children: Not on file    Years of education: Not on file    Highest education level: Not on file   Occupational History    Not on file   Tobacco Use    Smoking status: Never     Passive exposure: Yes    Smokeless tobacco: Never   Substance and Sexual Activity    Alcohol use: No     Comment: ocassionally    Drug use: No     Types: OTC, Prescription    Sexual activity: Not on file   Other Topics Concern    Not on file   Social History Narrative    Retired [employer] .      Social Determinants of Health     Financial Resource Strain: Not on file   Food Insecurity: Not on file   Transportation Needs: Not on file   Physical Activity: Not on file   Stress: Not on file   Social Connections: Not on file   Intimate Partner Violence: Not on file   Housing Stability: Not on file       Family History   Problem Relation Age of Onset Cancer Mother     Hypertension Mother     Hypertension Brother     Heart Disease Father     Hypertension Brother        ROS   Constitutional: negative  Ears, Nose, Mouth, Throat, and Face: negative  Respiratory: negative  Cardiovascular: negative  Gastrointestinal: positive for nausea and vomiting  Genitourinary:negative  Integument/Breast: negative  Hematologic/Lymphatic: negative  Behavioral/Psychiatric: negative  Allergic/Immunologic: negative      Physical Exam:     Visit Vitals  BP (!) 112/92   Pulse (!) 129   Temp 97.6 °F (36.4 °C)   Resp 20   Ht 5' 10\" (1.778 m)   Wt 145 lb (65.8 kg)   SpO2 97%   BMI 20.81 kg/m²       General - alert and oriented, no apparent distress, well developed  HEENT - no jaundice, no hearing imparement  Pulm - CTAB, no C/W/R  CV - RRR, no M/R/G  Abd -soft, mild distention, bowel sounds present, nontender to palpation, no guarding, no rebound, no hernia  Ext - pulses intact in UE and LE bilaterally, no edema  Skin - supple and no rashes  Psychiatric - normal affect, good mood    Labs  Lab Results   Component Value Date/Time    Sodium 127 (L) 09/06/2022 05:15 PM    Potassium 4.3 09/06/2022 05:15 PM    Chloride 95 (L) 09/06/2022 05:15 PM    CO2 22 09/06/2022 05:15 PM    Anion gap 10 09/06/2022 05:15 PM    Glucose 202 (H) 09/06/2022 05:15 PM    BUN 35 (H) 09/06/2022 05:15 PM    Creatinine 1.48 (H) 09/06/2022 05:15 PM    BUN/Creatinine ratio 24 (H) 09/06/2022 05:15 PM    GFR est AA 56 (L) 09/06/2022 05:15 PM    GFR est non-AA 46 (L) 09/06/2022 05:15 PM    Calcium 9.6 09/06/2022 05:15 PM    Bilirubin, total 0.8 09/06/2022 05:15 PM    Alk.  phosphatase 68 09/06/2022 05:15 PM    Protein, total 6.7 09/06/2022 05:15 PM    Albumin 3.5 09/06/2022 05:15 PM    Globulin 3.2 09/06/2022 05:15 PM    A-G Ratio 1.1 09/06/2022 05:15 PM    ALT (SGPT) 39 09/06/2022 05:15 PM    AST (SGOT) 18 09/06/2022 05:15 PM     Lab Results   Component Value Date/Time    WBC 14.0 (H) 09/06/2022 05:15 PM    WBC 6.5 06/25/2012 09:18 AM    HGB 13.3 09/06/2022 05:15 PM    HCT 38.0 09/06/2022 05:15 PM    PLATELET 059 58/99/3822 05:15 PM    MCV 92.7 09/06/2022 05:15 PM         Imaging  FINDINGS:      LIVER: Unchanged enhancing lesion in segment 4. Other previously noted small  enhancing lesions are not well seen. Small cysts and other low-density lesions  too small to characterize. No evidence for new hepatic abnormality. No biliary  ductal dilatation   GALLBLADDER: Decompressed without calcified stone  SPLEEN: Unremarkable  PANCREAS: Pancreatic tail mass is unchanged measuring 4 cm. Infiltrative lesion  encasing the posterior pancreatic body and mesenteric vessels is unchanged  measuring 3.6 cm (image 30). Pancreatic body and tail atrophy with ductal  dilatation is unchanged. No evidence for pancreatitis. Splenic vein occlusion. ADRENALS: Unremarkable. KIDNEYS/URETERS: Symmetric nephrograms without evidence for abnormal enhancing  mass. No hydronephrosis   PERITONEUM: Nonspecific soft tissue nodularity along the anterior abdominal wall  is unchanged (image 46, 53) and could represent metastatic foci. Unchanged left  retroperitoneal and right lower peritoneal lymphadenopathy. Small amount of  ascites. COLON: Nonspecific colitis involving the right colon  APPENDIX: Unremarkable. SMALL BOWEL: New moderate mid small bowel obstruction. STOMACH: Unremarkable. PELVIS: No pelvic lymphadenopathy. Mild pelvic free fluid  BONES: Unchanged right iliac mixed sclerotic lesion. Moderate degenerative  changes in the lumbar spine. VISUALIZED THORAX: Small lung nodules are again noted  ADDITIONAL COMMENTS: N/A     IMPRESSION     New mid small bowel mechanical obstruction. Nonspecific colitis of the right  colon. No other significant change.  Please refer to above findings for complete  details   I have personally reviewed all of the pertinent images     Assessment:     Alois Scheuermann. is a 67 y.o. male with small bowel obstruction, stage IV pancreatic cancer    Recommendations:     1. Patient has a small bowel obstruction but fortunately his abdomen is benign currently. Small bowel obstruction appears to be in the mid small bowel. Is possible it could be due to adhesions but I would suspect to be more likely due to stage IV pancreatic cancer with carcinomatosis. He appears to have some implants seen on the CT scan. He also has implants in the retroperitoneum. Currently undergoing chemotherapy with . At this point the plan will be for n.p.o. NG tube to suction and bowel rest and see if he can resolve this on his own. Operation on him with not be advised while he is undergoing chemotherapy. His risk for leak or if his operation turned open with dehiscence and evisceration would be quite high. The patient is not sure if he would want an operation if needed at this point. May also need to consider palliative care consultation. Would be good to get a consult with oncology to have them render an opinion on his chemotherapy and current staging. Wise for now just continue bowel rest n.p.o. NG tube.     Leslye Awad MD    Greater than half of the time: 55 minutes was used in counciling the patient about diagnosis and treatment plan

## 2022-09-07 NOTE — H&P
History & Physical    Primary Care Provider: Sasha Dillard MD  Source of Information: Patient and chart review    History of Presenting Illness:   Karen Chacon is a 67 y.o. male with history of stage IV pancreatic cancer, history of prostate cancer, hypertension, dyslipidemia who presents to hospital with complaints of  Nausea and vomiting x1 week. .  Reports near daily bowel movements but has not had one in last 3 days. Aching, generalized, nonradiating abdominal pain which is since resolved since placement of NG tube. Associated nausea without emesis. The patient denies any fever, chills, chest pain,vomiting, cough, congestion, recent illness, palpitations, or dysuria. Remarkable vitals on ER Presentations: Heart rate to 122  Labs Remarkable for: WBC 14, sodium 127, glucose 202, creatinine 1.4  ER Images: New mid small bowel mechanical obstruction. Nonspecific colitis of the right  colon. No other significant change. Please refer to above findings for complete  details   ER treatment-2 L normal saline bolus     Review of Systems:  A comprehensive review of systems was negative except for that written in the History of Present Illness. Past Medical History:   Diagnosis Date    Claudication [facility])     Constipation 10/22/2012    Glaucoma     Hypercholesterolemia     Hypertension     Pancreatitis 10/2012    Prostate cancer [facility])     s/p prostatectomy    Thrombocytopenia [address] [address] 7/27/2010      Past Surgical History:   Procedure Laterality Date    ENDOSCOPY, COLON, DIAGNOSTIC  1999    Normal; Dr. Cheli Zavala  04/2017    HX ORTHOPAEDIC      right wrist surgery    HX OTHER SURGICAL      torn cartridge in L knee    HX PROSTATECTOMY  2005    IR INSERT TUNL CVC W PORT OVER 5 YEARS  6/3/2022     Prior to Admission medications    Medication Sig Start Date End Date Taking?  Authorizing Provider   ciprofloxacin HCl (CIPRO) 250 mg tablet Take 1 Tablet by mouth two (2) times a day for 5 days. 8/31/22 9/5/22  Sly Almaraz NP   dexAMETHasone (DECADRON) 4 mg tablet Take 2 tabs (8mg) once daily on days 2 & 3 of chemotherapy 8/16/22   Sly Almaraz NP   prochlorperazine (Compazine) 5 mg tablet Take 1 Tablet by mouth every six (6) hours as needed for Nausea or Vomiting. 8/16/22   Sly Almaraz NP   ondansetron (ZOFRAN ODT) 8 mg disintegrating tablet Take 1 Tablet by mouth every eight (8) hours as needed for Nausea or Vomiting. 5/25/22   Nitin Albright MD   lidocaine-prilocaine (EMLA) topical cream Apply  to affected area as needed for Pain or PRN Reason (Other) (port access). Apply a thin layer to port site 30-60 minutes prior to arrival for chemotherapy treatments 5/25/22   Ashley Crooks MD   lipase-protease-amylase (Creon) 36,000-114,000- 180,000 unit cpDR capsule Take 8 Capsules by mouth daily as needed (based on size meals and snacks). Take 2 caps with first bite of full meals and 1 with first bite of snacks. 5/24/22   Catrachito Andres NP   lisinopril-hydroCHLOROthiazide (PRINZIDE, ZESTORETIC) 20-25 mg per tablet TAKE 1 TABLET TWICE DAILY 10/3/17   Estiven Nicolas MD   atorvastatin (LIPITOR) 40 mg tablet TAKE 1 TABLET EVERY NIGHT 10/3/17   Estiven Nicolas MD   verapamil ER (CALAN-SR) 240 mg CR tablet TAKE 1 TABLET EVERY DAY 10/3/17   Estiven Nicolas MD   PSYLLIUM SEED, WITH SUGAR, (METAMUCIL, SUGAR, PO) Take  by mouth. Provider, Historical   senna (SENNA) 8.6 mg tablet Take 1 Tab by mouth daily. 2/21/14   Estiven Nicolas MD   MULTIVITAMINS (MULTIVITAMIN PO) Take  by mouth.     Provider, Historical     Allergies   Allergen Reactions    Shellfish Derived Swelling    Zoloft [Sertraline] Other (comments)     Insomnia      Family History   Problem Relation Age of Onset    Cancer Mother     Hypertension Mother     Hypertension Brother     Heart Disease Father     Hypertension Brother         SOCIAL HISTORY:  Patient resides:  Independently x   Assisted Living SNF    With family care x      Smoking history:   None x   Former    Chronic      Alcohol history:   None x   Social    Chronic      Ambulates:   Independently x   w/cane    w/walker    w/wc    CODE STATUS:  DNR    Full x   Other      Objective:     Physical Exam:     Visit Vitals  BP (!) 112/92   Pulse (!) 129   Temp 97.6 °F (36.4 °C)   Resp 20   Ht 5' 10\" (1.778 m)   Wt 65.8 kg (145 lb)   SpO2 97%   BMI 20.81 kg/m²      O2 Device: None (Room air)    General:  Alert, cooperative, no distress, appears stated age. Head:  Normocephalic, without obvious abnormality, atraumatic. Eyes:  Conjunctivae/corneas clear. PERRL, EOMs intact. Nose: Nares normal. Septum midline. Mucosa normal.        Neck: Supple, symmetrical, trachea midline       Lungs:   Clear to auscultation bilaterally. Chest wall:  No tenderness or deformity. Heart:  Regular rate and rhythm, S1, S2 normal,    Abdomen:   Soft, non-tender. Distant but present bowel sounds bowel sounds normal. No masses,  No organomegaly. Extremities: Extremities normal, atraumatic, no cyanosis or edema. Pulses: 2+ and symmetric all extremities. Skin: Skin color, texture, turgor normal. No rashes or lesions   Neurologic: CNII-XII intact. Data Review:     Recent Days:  Recent Labs     09/06/22  1715   WBC 14.0*   HGB 13.3   HCT 38.0        Recent Labs     09/06/22  1715   *   K 4.3   CL 95*   CO2 22   *   BUN 35*   CREA 1.48*   CA 9.6   MG 1.9   ALB 3.5   ALT 39     No results for input(s): PH, PCO2, PO2, HCO3, FIO2 in the last 72 hours. 24 Hour Results:  Recent Results (from the past 24 hour(s))   CBC WITH AUTOMATED DIFF    Collection Time: 09/06/22  5:15 PM   Result Value Ref Range    WBC 14.0 (H) 4.1 - 11.1 K/uL    RBC 4.10 4. 10 - 5.70 M/uL    HGB 13.3 12.1 - 17.0 g/dL    HCT 38.0 36.6 - 50.3 %    MCV 92.7 80.0 - 99.0 FL    MCH 32.4 26.0 - 34.0 PG    MCHC 35.0 30.0 - 36.5 g/dL    RDW 16.0 (H) 11.5 - 14.5 %    PLATELET 645 263 - 400 K/uL    MPV 10.5 8.9 - 12.9 FL    NRBC 0.3 (H) 0  WBC    ABSOLUTE NRBC 0.04 (H) 0.00 - 0.01 K/uL    NEUTROPHILS 89 (H) 32 - 75 %    LYMPHOCYTES 5 (L) 12 - 49 %    MONOCYTES 5 5 - 13 %    EOSINOPHILS 0 0 - 7 %    BASOPHILS 0 0 - 1 %    IMMATURE GRANULOCYTES 1 (H) 0.0 - 0.5 %    ABS. NEUTROPHILS 12.5 (H) 1.8 - 8.0 K/UL    ABS. LYMPHOCYTES 0.7 (L) 0.8 - 3.5 K/UL    ABS. MONOCYTES 0.7 0.0 - 1.0 K/UL    ABS. EOSINOPHILS 0.0 0.0 - 0.4 K/UL    ABS. BASOPHILS 0.0 0.0 - 0.1 K/UL    ABS. IMM. GRANS. 0.1 (H) 0.00 - 0.04 K/UL    DF AUTOMATED      RBC COMMENTS ANISOCYTOSIS  1+       METABOLIC PANEL, COMPREHENSIVE    Collection Time: 09/06/22  5:15 PM   Result Value Ref Range    Sodium 127 (L) 136 - 145 mmol/L    Potassium 4.3 3.5 - 5.1 mmol/L    Chloride 95 (L) 97 - 108 mmol/L    CO2 22 21 - 32 mmol/L    Anion gap 10 5 - 15 mmol/L    Glucose 202 (H) 65 - 100 mg/dL    BUN 35 (H) 6 - 20 MG/DL    Creatinine 1.48 (H) 0.70 - 1.30 MG/DL    BUN/Creatinine ratio 24 (H) 12 - 20      GFR est AA 56 (L) >60 ml/min/1.73m2    GFR est non-AA 46 (L) >60 ml/min/1.73m2    Calcium 9.6 8.5 - 10.1 MG/DL    Bilirubin, total 0.8 0.2 - 1.0 MG/DL    ALT (SGPT) 39 12 - 78 U/L    AST (SGOT) 18 15 - 37 U/L    Alk. phosphatase 68 45 - 117 U/L    Protein, total 6.7 6.4 - 8.2 g/dL    Albumin 3.5 3.5 - 5.0 g/dL    Globulin 3.2 2.0 - 4.0 g/dL    A-G Ratio 1.1 1.1 - 2.2     SAMPLES BEING HELD    Collection Time: 09/06/22  5:15 PM   Result Value Ref Range    SAMPLES BEING HELD 1GREEN 1BLUE 1RED     COMMENT        Add-on orders for these samples will be processed based on acceptable specimen integrity and analyte stability, which may vary by analyte.    LIPASE    Collection Time: 09/06/22  5:15 PM   Result Value Ref Range    Lipase 98 73 - 393 U/L   MAGNESIUM    Collection Time: 09/06/22  5:15 PM   Result Value Ref Range    Magnesium 1.9 1.6 - 2.4 mg/dL   POC LACTIC ACID    Collection Time: 09/06/22  5:48 PM   Result Value Ref Range    Lactic Acid (POC) 2. 00 0.40 - 2.00 mmol/L   URINALYSIS W/MICROSCOPIC    Collection Time: 09/06/22  6:51 PM   Result Value Ref Range    Color YELLOW/STRAW      Appearance CLEAR CLEAR      Specific gravity <1.005 1.003 - 1.030    pH (UA) 5.5 5.0 - 8.0      Protein Negative NEG mg/dL    Glucose Negative NEG mg/dL    Ketone Negative NEG mg/dL    Bilirubin Negative NEG      Blood Negative NEG      Urobilinogen 0.2 0.2 - 1.0 EU/dL    Nitrites Negative NEG      Leukocyte Esterase Negative NEG      WBC 0-4 0 - 4 /hpf    RBC 0-5 0 - 5 /hpf    Epithelial cells FEW FEW /lpf    Bacteria Negative NEG /hpf   URINE CULTURE HOLD SAMPLE    Collection Time: 09/06/22  6:51 PM    Specimen: Serum; Urine   Result Value Ref Range    Urine culture hold        Urine on hold in Microbiology dept for 2 days. If unpreserved urine is submitted, it cannot be used for addtional testing after 24 hours, recollection will be required. Imaging:     Assessment:     Marialuisa Franco Sr. is a 67 y.o. male with history of stage IV pancreatic cancer, history of prostate cancer, hypertension, dyslipidemia who is admitted for small bowel obstruction and acute colitis.        Plan:       Small bowel obstruction  -Suspected mechanical obstruction in setting of pancreatic cancer  -Conservative management per surgery  -Keep NG tube  -Cautious volume resuscitation  -Monitor on telemetry-  -N.p.o.  -Remainder of management per surgery    Acute colitis  -Given immunocompromise state and leukocytosis, obtain blood cultures and start empiric Zosyn  -Send stool studies if obtainable    History of stage IV pancreatic cancer  -Oncology consult    Hypertension  -Hold BP lowering agents given labile BPs        NIDDM II  -SSI +Hypoglycemic protocols    Obesity  -Counseled on weight loss, dieting and exercise          FEN/GI -  [email or web address]  Activity - as tolerated  DVT prophylaxis - SCDs  GI prophylaxis -  NI  Disposition - tbd    CODE STATUS:  full code       Signed By: Nellie Sandifer Silas Bunch MD     September 6, 2022

## 2022-09-07 NOTE — PROGRESS NOTES
Problem: Self Care Deficits Care Plan (Adult)  Goal: *Acute Goals and Plan of Care (Insert Text)  Description: FUNCTIONAL STATUS PRIOR TO ADMISSION: Patient was independent and active without use of DME, was independent for basic and instrumental ADLs. HOME SUPPORT: The patient lived with his wife and son but did not require assist.    Occupational Therapy Goals  Initiated 9/7/2022  1. Patient will perform grooming at the sink with supervision/set-up within 7 day(s). 2.  Patient will perform bathing with supervision/set-up within 7 day(s). 3.  Patient will perform upper and lower body dressing with supervision/set-up within 7 day(s). 4.  Patient will perform toilet transfers with supervision/set-up within 7 day(s). 5.  Patient will perform all aspects of toileting with supervision/set-up within 7 day(s). 6.  Patient will participate in upper extremity therapeutic exercise/activities with supervision/set-up for 10 minutes within 7 day(s). 7.  Patient will utilize energy conservation techniques during functional activities with verbal and visual cues within 7 day(s). Outcome: Not Met     OCCUPATIONAL THERAPY EVALUATION  Patient: Erendira Bowman Sr. (78 y.o. male)  Date: 9/7/2022  Primary Diagnosis: [facility] [K56.609]       Precautions: Fall (NPO)    ASSESSMENT  Based on the objective data described below, the patient presents with decreased standing balance, cardiopulmonary endurance, strength (page LUE, baseline arthritis, new general debility), and safety awareness s/p admission for SBP, now NPO with NGT. At baseline, he is IND for ADLs and mobility with no DME use, lives with his wife and son. He now presents slightly below his baseline, requiring up to CGA for steadying with bathroom mobility and standing ADLs. Tachycardia noted with limited activity, HR up to 127 with return to EOB, slowing with supine rest break.  Encouraged OOB ADLs, mobility with staff assist, and meals in chair, patient and son acknowledging understanding. Recommend HHOT vs none pending progress. Current Level of Function Impacting Discharge (ADLs/self-care): SPV-CGA for ADLs and mobility with no AD    Functional Outcome Measure: The patient scored Total: 55/100 on the Barthel Index outcome measure which is indicative of being partially dependent in basic self-care. Other factors to consider for discharge: fall risk, NPO with NG     Patient will benefit from skilled therapy intervention to address the above noted impairments. PLAN :  Recommendations and Planned Interventions: self care training, functional mobility training, therapeutic exercise, balance training, therapeutic activities, endurance activities, patient education, home safety training, and family training/education    Frequency/Duration: Patient will be followed by occupational therapy 3 times a week to address goals. Recommendation for discharge: (in order for the patient to meet his/her long term goals)  Occupational therapy at least 2 days/week in the home vs none    This discharge recommendation:  A follow-up discussion with the attending provider and/or case management is planned    IF patient discharges home will need the following DME: anticipate none--has ADL DME needed       SUBJECTIVE:   Patient stated Ow! I have arthritis in that shoulder [L].     OBJECTIVE DATA SUMMARY:   HISTORY:   Past Medical History:   Diagnosis Date    Claudication ([facility]     Constipation 10/22/2012    Glaucoma     Hypercholesterolemia     Hypertension     Pancreatitis 10/2012    Prostate cancer [facility])     s/p prostatectomy    Thrombocytopenia ([facility] 7/27/2010     Past Surgical History:   Procedure Laterality Date    ENDOSCOPY, COLON, DIAGNOSTIC  1999    Normal; Dr. Cheli Zavala  04/2017    HX ORTHOPAEDIC      right wrist surgery    HX OTHER SURGICAL      torn cartridge in L knee    HX PROSTATECTOMY  2005    IR INSERT TUNL BEATRIZ GALLAGHER OVER 5 YEARS  6/3/2022       Expanded or extensive additional review of patient history:     Home Situation  Home Environment: Private residence  # Steps to Enter: 1  Rails to Enter: No  One/Two Story Residence: One story  Living Alone: No  Support Systems: Spouse/Significant Other, Child(ivet) (son)  Patient Expects to be Discharged to[de-identified] Home  Current DME Used/Available at Home: Brissa Gambler, rolling, Shower chair  Tub or Shower Type: Tub/Shower combination    Hand dominance: Right    EXAMINATION OF PERFORMANCE DEFICITS:  Cognitive/Behavioral Status:  Neurologic State: Alert  Orientation Level: Oriented X4  Cognition: Appropriate for age attention/concentration; Follows commands  Perception: Appears intact  Perseveration: No perseveration noted  Safety/Judgement: Awareness of environment;Decreased awareness of need for safety    Skin: Appears intact    Edema: None     Hearing:       Vision/Perceptual:    Tracking: Able to track stimulus in all quadrants w/o difficulty                 Diplopia: No    Acuity: Within Defined Limits    Corrective Lenses: Glasses    Range of Motion:  AROM: Generally decreased, functional (L shoulder arthritis at baseline)                         Strength:  Strength: Generally decreased, functional (L shoulder arthritis at baseline)                Coordination:  Coordination: Within functional limits  Fine Motor Skills-Upper: Left Intact; Right Intact    Gross Motor Skills-Upper: Right Intact; Left Impaired    Tone & Sensation:  Tone: Normal  Sensation: Intact                      Balance:  Sitting: Intact  Standing: Impaired; Without support  Standing - Static: Good  Standing - Dynamic : Good;Fair    Functional Mobility and Transfers for ADLs:  Bed Mobility:  Rolling: Supervision  Supine to Sit: Supervision  Sit to Supine: Supervision  Scooting: Supervision    Transfers:  Sit to Stand: Stand-by assistance  Stand to Sit: Stand-by assistance  Bathroom Mobility: Contact guard assistance  Toilet Transfer : Stand-by assistance  Assistive Device : Gait Belt    ADL Assessment:  Feeding: Independent    Oral Facial Hygiene/Grooming: Contact guard assistance    Bathing: Contact guard assistance         Upper Body Dressing: Setup    Lower Body Dressing: Contact guard assistance    Toileting: Contact guard assistance                ADL Intervention and task modifications:                                Lower Body Dressing Assistance  Dressing Assistance: Contact guard assistance  Pants With Elastic Waist: Contact guard assistance (simulated)  Socks: Supervision  Leg Crossed Method Used: No  Position Performed: Bending forward method;Seated edge of bed;Standing  Cues: Verbal cues provided;Visual cues provided; Tactile cues provided    Toileting  Toileting Assistance: Contact guard assistance (simulated in bathroom)  Bladder Hygiene: Stand-by assistance  Bowel Hygiene: Contact guard assistance (simulated standing)  Clothing Management: Contact guard assistance  Cues: Tactile cues provided;Verbal cues provided;Visual cues provided  Adaptive Equipment: Grab bars    Cognitive Retraining  Safety/Judgement: Awareness of environment;Decreased awareness of need for safety    Functional Measure:    Barthel Index:  Bathin  Bladder: 10  Bowels: 10  Groomin  Dressin  Feeding: 10  Mobility: 0  Stairs: 0  Toilet Use: 5  Transfer (Bed to Chair and Back): 10  Total: 55/100      The Barthel ADL Index: Guidelines  1. The index should be used as a record of what a patient does, not as a record of what a patient could do. 2. The main aim is to establish degree of independence from any help, physical or verbal, however minor and for whatever reason. 3. The need for supervision renders the patient not independent. 4. A patient's performance should be established using the best available evidence.  Asking the patient, friends/relatives and nurses are the usual sources, but direct observation and common sense are also important. However direct testing is not needed. 5. Usually the patient's performance over the preceding 24-48 hours is important, but occasionally longer periods will be relevant. 6. Middle categories imply that the patient supplies over 50 per cent of the effort. 7. Use of aids to be independent is allowed. Score Interpretation (from [address] [facility])    Independent   60-79 Minimally independent   40-59 Partially dependent   20-39 Very dependent   <20 Totally dependent     -Mariya Padilla., Barthel, DKostasW. (1965). Functional evaluation: the Barthel Index. [address] ([address] (1997). The Barthel activities of daily living index: self-reporting versus actual performance in the old (> or = 75 years). Journal [address] 45(7), [address] [facility], ALEJANDRO, [facility]., [facility]. (1999). Measuring the change in disability after inpatient rehabilitation; comparison of the responsiveness of the Barthel Index and Functional Carson Measure. Journal of Neurology, Neurosurgery, and Psychiatry, 66(4), 252-730. Gregorio Woodson, N.J.A, WILLIE Jeffrey, & Olivia Shipman M.A. (2004) Assessment of post-stroke quality of life in cost-effectiveness studies: The usefulness of the Barthel Index and the EuroQoL-5D.  Quality of Life Research, 15, 188-42     Occupational Therapy Evaluation Charge Determination   History Examination Decision-Making   LOW Complexity : Brief history review  LOW Complexity : 1-3 performance deficits relating to physical, cognitive , or psychosocial skils that result in activity limitations and / or participation restrictions  LOW Complexity : No comorbidities that affect functional and no verbal or physical assistance needed to complete eval tasks       Based on the above components, the patient evaluation is determined to be of the following complexity level: LOW   Pain Rating:  None reported    Activity Tolerance:   Good-Fair, tachycardic with limited OOB mobility (-127), steadying to ~106 at rest    After treatment patient left in no apparent distress:    Supine in bed, Call bell within reach, Caregiver / family present, and Side rails x 3    COMMUNICATION/EDUCATION:   The patients plan of care was discussed with: Physical therapist and Registered nurse. Home safety education was provided and the patient/caregiver indicated understanding., Patient/family have participated as able in goal setting and plan of care. , and Patient/family agree to work toward stated goals and plan of care. This patients plan of care is appropriate for delegation to [facility].     Thank you for this referral.  JORDAN Light, OTR/L  Time Calculation: 14 mins

## 2022-09-07 NOTE — PROGRESS NOTES
Physical Therapy 9/7/2022    Orders received and Chart reviewed up to date. Pt transporting ARABELLA. Will follow-up as appropriate. Thank you.   Laina Lamas, PT, DPT

## 2022-09-07 NOTE — ROUTINE PROCESS
[facility] REPORT:[id number]*erbal report given to Brittany(name) on [facility]  being transferred to (unit) for routine progression of care       Report consisted of patients Situation, Background, Assessment and   Recommendations(SBAR). Information from the following report(s) SBAR, Kardex, ED Summary, and Recent Results was reviewed with the receiving nurse. Lines:   Venous Access Device Mediport Vaccess CT lot [id number]  exp 06- 06/03/22 Upper chest (subclavicular area, right (Active)       Peripheral IV 09/07/22 Left Antecubital (Active)        Opportunity for questions and clarification was provided.       Patient transported with: tech

## 2022-09-07 NOTE — PROGRESS NOTES
[facility] at [facility]  HD 2  Subjective     N.p.o., NG tube placed, patient said he is passing gas. Says his belly feels a little bit better having no pain currently.     Objective     Patient Vitals for the past 24 hrs:   Temp Pulse Resp BP SpO2   09/07/22 0800 98.5 °F (36.9 °C) (!) 108 12 136/87 95 %   09/07/22 0612 -- (!) 107 23 -- 98 %   09/07/22 0611 -- (!) 109 20 130/66 97 %   09/07/22 0600 -- (!) 105 (!) 31 111/67 96 %   09/07/22 0545 -- (!) 105 17 114/67 96 %   09/07/22 0535 -- (!) 102 18 -- 96 %   09/07/22 0530 -- (!) 105 19 122/69 96 %   09/07/22 0515 -- (!) 104 16 124/65 96 %   09/07/22 0500 -- (!) 105 19 128/63 94 %   09/07/22 0445 -- (!) 110 16 106/63 94 %   09/07/22 0430 -- (!) 106 19 123/70 --   09/07/22 0415 -- (!) 106 18 115/71 --   09/07/22 0400 -- (!) 107 14 104/69 --   09/07/22 0341 -- (!) 118 17 106/62 --   09/07/22 0215 -- (!) 112 20 102/74 100 %   09/07/22 0148 -- (!) 122 18 114/75 --   09/07/22 0118 -- (!) 115 22 -- --   09/07/22 0001 -- (!) 120 18 108/86 --   09/06/22 2358 -- (!) 117 13 121/75 --   09/06/22 2206 -- (!) 131 23 -- 100 %   09/06/22 2040 -- (!) 129 20 -- 97 %   09/06/22 1910 -- (!) 115 12 -- --   09/06/22 1840 -- (!) 110 18 -- 100 %   09/06/22 1738 -- (!) 112 14 (!) 112/92 100 %   09/06/22 1723 -- (!) 116 17 97/66 100 %   09/06/22 1708 -- (!) 119 16 124/71 94 %   09/06/22 1706 97.6 °F (36.4 °C) (!) 122 18 124/71 94 %       Date 09/06/22 0700 - 09/07/22 0659 09/07/22 0700 - 09/08/22 0659   Shift 3265-3659 7139-9972 24 Hour Total 7125-5836 3946-2118 24 Hour Total   INTAKE   Shift Total(mL/kg)         OUTPUT   Urine(mL/kg/hr)           Urine Occurrence(s) 1 x  1 x      Shift Total(mL/kg)         NET         Weight (kg) 65.8 65.8 65.8 65.8 65.8 65.8       PE  Pulm - CTAB  CV - RRR  Abd -soft, nondistended, bowel sounds present, mild tenderness to palpation, no guarding, no hernia    Labs  Lab Results Component Value Date/Time    Sodium 130 (L) 09/07/2022 04:00 AM    Potassium 4.3 09/07/2022 04:00 AM    Chloride 97 09/07/2022 04:00 AM    CO2 24 09/07/2022 04:00 AM    Anion gap 9 09/07/2022 04:00 AM    Glucose 157 (H) 09/07/2022 04:00 AM    BUN 37 (H) 09/07/2022 04:00 AM    Creatinine 1.68 (H) 09/07/2022 04:00 AM    BUN/Creatinine ratio 22 (H) 09/07/2022 04:00 AM    GFR est AA 48 (L) 09/07/2022 04:00 AM    GFR est non-AA 40 (L) 09/07/2022 04:00 AM    Calcium 9.4 09/07/2022 04:00 AM    Bilirubin, total 1.1 (H) 09/07/2022 04:00 AM    Alk. phosphatase 65 09/07/2022 04:00 AM    Protein, total 6.5 09/07/2022 04:00 AM    Albumin 3.2 (L) 09/07/2022 04:00 AM    Globulin 3.3 09/07/2022 04:00 AM    A-G Ratio 1.0 (L) 09/07/2022 04:00 AM    ALT (SGPT) 37 09/07/2022 04:00 AM    AST (SGOT) 15 09/07/2022 04:00 AM     Lab Results   Component Value Date/Time    WBC 17.4 (H) 09/07/2022 04:00 AM    WBC 6.5 06/25/2012 09:18 AM    HGB 13.0 09/07/2022 04:00 AM    HCT 37.2 09/07/2022 04:00 AM    PLATELET 449 89/58/1278 04:00 AM    MCV 93.0 09/07/2022 04:00 AM         Assessment     Roxanacathrynleonor Ruy Sandhu Sr. is a 67 y. o.yr old male with small bowel obstruction, stage IV pancreatic cancer possible carcinomatosis    Plan     He appears to be doing fine with a small bowel obstruction currently  He currently has NG tube in place with some slight bilious output.   For now continue bowel rest and see if this will improve with time  Return to see if the small bowel obstruction is due to adhesions or possible carcinomatosis  Consider oncology consultation to render an opinion on his prognosis should things look worse for him and also since he is on chemotherapy currently  No surgical plans at this time continue conservative treatment with the NG tube n.p.o. and check abdominal x-ray    Shalom Vega MD

## 2022-09-07 NOTE — PROGRESS NOTES
Problem: Mobility Impaired (Adult and Pediatric)  Goal: *Acute Goals and Plan of Care (Insert Text)  Description: FUNCTIONAL STATUS PRIOR TO ADMISSION: Patient was independent and active without use of DME.    HOME SUPPORT PRIOR TO ADMISSION: The patient lived with wife and son but did not require assist.    Physical Therapy Goals  Initiated 9/7/2022  1. Patient will move from supine to sit and sit to supine  in bed with modified independence within 7 day(s). 2.  Patient will transfer from bed to chair and chair to bed with modified independence using the least restrictive device within 7 day(s). 3.  Patient will perform sit to stand with modified independence within 7 day(s). 4.  Patient will ambulate with modified independence for 300 feet with the least restrictive device within 7 day(s). 5.  Patient will ascend/descend 1 stairs with one handrail(s) with modified independence within 7 day(s). Outcome: Progressing Towards Goal   PHYSICAL THERAPY EVALUATION  Patient: Aida Lerma Sr. (78 y.o. male)  Date: 9/7/2022  Primary Diagnosis: S[facility] [K56.609]       Precautions:   Fall (NPO)      ASSESSMENT  Based on the objective data described below, the patient presents with decreased activity tolerance and gait deviations. Session limited as transport arrived to room for X-ray. Pt ambulated short distance demonstrating decreased step length and fair steadiness. Set up chair for pt to sit in later. Will progress ambulation as tolerated and anticipate HHPT upon discharge to continue addressing impairments. Current Level of Function Impacting Discharge (mobility/balance): supervision ambulation    Functional Outcome Measure: The patient scored Total: 55/100 on the Barthel Index outcome measure which is indicative of being partially dependent in basic self-care.      Other factors to consider for discharge: fall risk, supportive family      Patient will benefit from skilled therapy intervention to address the above noted impairments. PLAN :  Recommendations and Planned Interventions: bed mobility training, transfer training, gait training, therapeutic exercises, neuromuscular re-education, patient and family training/education, and therapeutic activities      Frequency/Duration: Patient will be followed by physical therapy:  3 times a week to address goals. Recommendation for discharge: (in order for the patient to meet his/her long term goals)  Physical therapy at least 2 days/week in the home     This discharge recommendation:  Has not yet been discussed the attending provider and/or case management    IF patient discharges home will need the following DME: none         SUBJECTIVE:   Patient stated I have had hiccups for 2 days.     OBJECTIVE DATA SUMMARY:   HISTORY:    Past Medical History:   Diagnosis Date    Claudication ([facility]     Constipation 10/22/2012    Glaucoma     Hypercholesterolemia     Hypertension     Pancreatitis 10/2012    Prostate cancer [facility])     s/p prostatectomy    Thrombocytopenia ([facility] 7/27/2010     Past Surgical History:   Procedure Laterality Date    ENDOSCOPY, COLON, DIAGNOSTIC  1999    Normal; Dr. Airam Gregorio  04/2017    HX ORTHOPAEDIC      right wrist surgery    HX OTHER SURGICAL      torn cartridge in L knee    HX PROSTATECTOMY  2005    IR INSERT TUNL CVC W PORT OVER 5 YEARS  6/3/2022       Personal factors and/or comorbidities impacting plan of care: PMH    Home Situation  Home Environment: Private residence  # Steps to Enter: 1  Rails to Enter: No  One/Two Story Residence: One story  Living Alone: No  Support Systems: Spouse/Significant Other, Child(ivet) (son)  Patient Expects to be Discharged to[de-identified] Home  Current DME Used/Available at Home: shamir Amin, [address] chair  Tub or Shower Type: Tub/Shower combination    EXAMINATION/PRESENTATION/DECISION MAKING:   Critical Behavior:  Neurologic State: Alert  Orientation Level: Oriented X4  Cognition: Appropriate for age attention/concentration, Follows commands  Safety/Judgement: Awareness of environment, Decreased awareness of need for safety  Hearing:     Skin:  intact  Edema: none  Range Of Motion:  AROM: Generally decreased, functional (L shoulder arthritis at baseline)                       Strength:    Strength: Generally decreased, functional (L shoulder arthritis at baseline)                    Tone & Sensation:   Tone: Normal              Sensation: Intact               Coordination:  Coordination: Within functional limits  Vision:   Tracking: Able to track stimulus in all quadrants w/o difficulty  Diplopia: No  Acuity: Within Defined Limits  Corrective Lenses: Glasses  Functional Mobility:  Bed Mobility:  Rolling: Supervision  Supine to Sit: Supervision  Sit to Supine: Supervision  Scooting: Supervision  Transfers:  Sit to Stand: Stand-by assistance  Stand to Sit: Stand-by assistance                       Balance:   Sitting: Intact  Standing: Impaired; Without support  Standing - Static: Good  Standing - Dynamic : Good;Fair  Ambulation/Gait Training:  Distance (ft): 30 Feet (ft) (x2)     Ambulation - Level of Assistance: Adaptive equipment; Additional time;Supervision        Gait Abnormalities: Decreased step clearance;Trunk sway increased        Base of Support: Narrowed     Speed/Sherry: Pace decreased (<100 feet/min); Slow  Step Length: Left shortened;Right shortened       Functional Measure:  Barthel Index:    Bathin  Bladder: 10  Bowels: 10  Groomin  Dressin  Feeding: 10  Mobility: 0  Stairs: 0  Toilet Use: 5  Transfer (Bed to Chair and Back): 10  Total: 55/100       The Barthel ADL Index: Guidelines  1. The index should be used as a record of what a patient does, not as a record of what a patient could do. 2. The main aim is to establish degree of independence from any help, physical or verbal, however minor and for whatever reason.   3. The need for supervision renders the patient not independent. 4. A patient's performance should be established using the best available evidence. Asking the patient, friends/relatives and nurses are the usual sources, but direct observation and common sense are also important. However direct testing is not needed. 5. Usually the patient's performance over the preceding 24-48 hours is important, but occasionally longer periods will be relevant. 6. Middle categories imply that the patient supplies over 50 per cent of the effort. 7. Use of aids to be independent is allowed. Score Interpretation (from [facility])    Independent   60-79 Minimally independent   40-59 Partially dependent   20-39 Very dependent   <20 Totally dependent     -Mariya Padilla., Barthel, DNAHOMY. (1965). Functional evaluation: the Barthel Index. [facility] ([address] (1997). The Barthel activities of daily living index: self-reporting versus actual performance in the old (> or = 75 years). Journal of [address] [place] [address] 45(7), [facility], RUSS, Emmett Mina., Majo Taylor. (1999). Measuring the change in disability after inpatient rehabilitation; comparison of the responsiveness of the Barthel Index and Functional [place] Measure. Journal of Neurology, Neurosurgery, and Psychiatry, 66(4), 175-929. Charlene Ye, N.J.A, WILLIE Jeffrey, & Nathan Sifuentes, MKostasA. (2004) Assessment of post-stroke quality of life in cost-effectiveness studies: The usefulness of the Barthel Index and the EuroQoL-5D.  Quality of Life Research, 15, 237-02        Physical Therapy Evaluation Charge Determination   History Examination Presentation Decision-Making   HIGH Complexity :3+ comorbidities / personal factors will impact the outcome/ POC  LOW Complexity : 1-2 Standardized tests and measures addressing body structure, function, activity limitation and / or participation in recreation  MEDIUM Complexity : Evolving with changing characteristics  Other outcome measures barthel  MEDIUM      Based on the above components, the patient evaluation is determined to be of the following complexity level: LOW         Activity Tolerance:   Fair and requires rest breaks    After treatment patient left in no apparent distress:   In w/c with transport, RN aware    COMMUNICATION/EDUCATION:   The patients plan of care was discussed with: Occupational therapist and Registered nurse. Fall prevention education was provided and the patient/caregiver indicated understanding., Patient/family have participated as able in goal setting and plan of care. , and Patient/family agree to work toward stated goals and plan of care.     Thank you for this referral.  Jesusita Stock, PT   Time Calculation: 10 mins

## 2022-09-07 NOTE — PROGRESS NOTES
Occupational Therapy   9/7/2022    Orders received and chart reviewed up to date. Pt transporting ARABELLA at this time, will follow-up as appropriate for OT evaluation.        Thank you,  Apple Sanz, OTD, OTR/L

## 2022-09-07 NOTE — PROGRESS NOTES
Transition of Care Plan  RUR: 20%   DISPOSITION: The disposition plan is home with HHPT/OT; would like referral sent to [place] once orders have been placed   F/U with PCP/Specialist    Transport: family       Reason for Admission:   SBO                 RUR Score:   20%        PCP: First and Last name:  Zeina Alfonso MD     Name of Practice:   Are you a current patient: Yes/No:   Approximate date of last visit:    Can you do a virtual visit with your PCP:              Resources/supports as identified by patient/family:   family                Top Challenges facing patient (as identified by patient/family and CM):  none                     Finances/Medication cost?                    Transportation? Support system or lack thereof? Living arrangements? Self-care/ADLs/Cognition? Current Advanced Directive/Advance Care Plan:  Full Code      Healthcare Decision Maker:   Click here to complete HealthCare Decision Makers including selection of the Healthcare Decision Maker Relationship (ie \"Primary\")        Payor Source Payor: Nikki Martinez / Plan: [address] [place] [address] HMO / Product Type: Managed Care Medicare /                             Plan for utilizing home health:    recommended PT/OT                 Transition of Care Plan:                    CRM spoke with patient's wife, introduced self, explained role, verified demographics, and offered assistance. Patient lives with his wife in a home with no steps to enter. Patient is independent in his ADLs/IADLs and does not have any DME. Patient uses [facility] for his prescriptions. Care Management Interventions  PCP Verified by CM: Yes  Palliative Care Criteria Met (RRAT>21 & CHF Dx)?: No  Mode of Transport at Discharge:  Other (see comment) (family)  Transition of Care Consult (CM Consult): Discharge Planning  MyChart Signup: No  Discharge Durable Medical Equipment: No  Health Maintenance Reviewed: Yes  Physical Therapy Consult: Yes  Occupational Therapy Consult: Yes  Speech Therapy Consult: No  Support Systems: Spouse/Significant Other  Confirm Follow Up Transport: Family  The Patient and/or Patient Representative was Provided with a Choice of Provider and Agrees with the Discharge Plan?: Yes  Freedom of Choice List was Provided with Basic Dialogue that Supports the Patient's Individualized Plan of Care/Goals, Treatment Preferences and Shares the Quality Data Associated with the Providers?: Yes   Resource Information Provided?: No  Discharge Location  Patient Expects to be Discharged to[de-identified] Home with home health    Transition of Care Plan:     The Plan for Transition of Care is related to the following treatment goals: HHPT/OT    Josh at Home    The Patient and patient's wife was provided with a choice of provider and agrees  with the discharge plan. Yes [x] No []    A Freedom of choice list was provided with basic dialogue that supports the patient's individualized plan of care/goals and shares the quality data associated with the providers.        Yes [x] No []      7:40 PM  ED Ariza

## 2022-09-08 NOTE — PROGRESS NOTES
Transition of Care Plan  RUR- 20% High Risk  DISPOSITION: The disposition plan is pending medical progression and recommendation. F/U with PCP/Specialist    Transport: Family    At this time the disposition plan is pending. Per rehab services, HHPT/OT vs None. Once confirmed CM to assist with CHAVO needs as they arise.     MARINA Salazar, CRM, LMHP-e  Available in [employer]

## 2022-09-08 NOTE — PROGRESS NOTES
[facility]ist Service Progress Note    INTERVAL HISTORY  / Subjective: Follow up SBO. Patient seen and examined. No pain. Passing gas. WBC trending up. Son at bedside and updated. Assessment / Plan:  Admission History:  67 y.o. male with history of stage IV pancreatic cancer, history of prostate cancer, hypertension, dyslipidemia who presents to hospital with complaints of  Nausea and vomiting x1 week. .  Reports near daily bowel movements but has not had one in last 3 days. Aching, generalized, nonradiating abdominal pain which is since resolved since placement of NG tube. Associated nausea without emesis. The patient denies any fever, chills, chest pain,vomiting, cough, congestion, recent illness, palpitations, or dysuria. Remarkable vitals on ER Presentations: Heart rate to 122  Labs Remarkable for: WBC 14, sodium 127, glucose 202, creatinine 1.4  ER Images: New mid small bowel mechanical obstruction. Nonspecific colitis of the right  colon. Small bowel obstruction- persistent  -Suspected mechanical obstruction in setting of pancreatic cancer  -Conservative management per surgery  -Keep NG tube  -Cautious volume resuscitation.  Continue IVFs  -Monitor on telemetry  -N.p.o.  -Remainder of management per surgery    History of stage IV pancreatic cancer  -Oncology consult     Acute colitis  -continue empiric Zosyn  -Send stool studies if obtainable     Hypertension  -Hold BP lowering agents given labile Bps    RENETTA, Hyponatremia, and Dehydration  --LR 125CC/hr      NIDDM II  -SSI +Hypoglycemic protocols     Obesity  -Counseled on weight loss, dieting and exercise      Objective:  Visit Vitals  /74 (BP 1 Location: Left upper arm, BP Patient Position: At rest)   Pulse (!) 102   Temp 98.1 °F (36.7 °C)   Resp 20   Ht 5' 10\" (1.778 m)   Wt 65.8 kg (145 lb)   SpO2 99%   BMI 20.81 kg/m²                 Physical Exam:  General: Thin elderly appearing man   Neck: Supple, no lymphadenopathy, no JVD Lungs: Clear to auscultation bilaterally , no increase work of breathing  Cardiovascular: Regular rate and rhythm with normal S1 and S2   Abdomen: Soft, nontender, nondistended, hypoactive bowels sounds   Extremities: No cyanosis clubbing or edema   Neuro: Nonfocal, A&O x3   Psych: Normal affect     Intake and Output:  Date 09/07/22 0700 - 09/08/22 0659 09/08/22 0700 - 09/09/22 0659   Shift [id number] 1900-0659 24 Hour Total 0700-1859 1900-0659 24 Hour Total   INTAKE   Shift Total(mL/kg)         OUTPUT   Urine(mL/kg/hr) 400(0.5) 275(0.3) 675(0.4)        Urine Voided 400 275 675      Emesis/NG output 900  900        Output (ml) (Nasogastric Tube 09/06/22) 900  900      Shift Total(mL/kg) 1300(19.8) 275(4.2) 1575(23.9)      NET -1300 -275 -1575      Weight (kg) 65.8 65.8 65.8 65.8 65.8 65.8         LAB:  Admission on 09/06/2022   Component Date Value    WBC 09/06/2022 14.0 (A)    RBC 09/06/2022 4.10     HGB 09/06/2022 13.3     HCT 09/06/2022 38.0     MCV 09/06/2022 92.7     MCH 09/06/2022 32.4     MCHC 09/06/2022 35.0     RDW 09/06/2022 16.0 (A)    PLATELET 44/86/4636 801     MPV 09/06/2022 10.5     NRBC 09/06/2022 0.3 (A)    ABSOLUTE NRBC 09/06/2022 0.04 (A)    NEUTROPHILS 09/06/2022 89 (A)    LYMPHOCYTES 09/06/2022 5 (A)    MONOCYTES 09/06/2022 5     EOSINOPHILS 09/06/2022 0     BASOPHILS 09/06/2022 0     IMMATURE GRANULOCYTES 09/06/2022 1 (A)    ABS. NEUTROPHILS 09/06/2022 12.5 (A)    ABS. LYMPHOCYTES 09/06/2022 0.7 (A)    ABS. MONOCYTES 09/06/2022 0.7     ABS. EOSINOPHILS 09/06/2022 0.0     ABS. BASOPHILS 09/06/2022 0.0     ABS. IMM.  GRANS. 09/06/2022 0.1 (A)    DF 09/06/2022 AUTOMATED     RBC COMMENTS 09/06/2022                      Value:ANISOCYTOSIS  1+      Sodium 09/06/2022 127 (A)    Potassium 09/06/2022 4.3     Chloride 09/06/2022 95 (A)    CO2 09/06/2022 22     Anion gap 09/06/2022 10     Glucose 09/06/2022 202 (A)    BUN 09/06/2022 35 (A)    Creatinine 09/06/2022 1.48 (A)    BUN/Creatinine ratio 09/06/2022 24 (A)    GFR est AA 09/06/2022 56 (A)    GFR est non-AA 09/06/2022 46 (A)    Calcium 09/06/2022 9.6     Bilirubin, total 09/06/2022 0.8     ALT (SGPT) 09/06/2022 39     AST (SGOT) 09/06/2022 18     Alk. phosphatase 09/06/2022 68     Protein, total 09/06/2022 6.7     Albumin 09/06/2022 3.5     Globulin 09/06/2022 3.2     A-G Ratio 09/06/2022 1.1     SAMPLES BEING HELD 09/06/2022 1GREEN 1BLUE 1RED     COMMENT 09/06/2022 Add-on orders for these samples will be processed based on acceptable specimen integrity and analyte stability, which may vary by analyte. Ventricular Rate 09/06/2022 118     Atrial Rate 09/06/2022 118     P-R Interval 09/06/2022 124     QRS Duration 09/06/2022 84     Q-T Interval 09/06/2022 330     QTC Calculation (Bezet) 09/06/2022 462     Calculated P Axis 09/06/2022 66     Calculated R Axis 09/06/2022 -21     Calculated T Axis 09/06/2022 54     Diagnosis 09/06/2022                      Value:Sinus tachycardia  Nonspecific ST and T wave abnormality  Abnormal ECG  When compared with ECG of 07-AUG-2022 14:26,  No significant change was found  Confirmed by Citlalli Norman M.D., [id number] on 9/7/2022 8:57:58 AM      Color 09/06/2022 YELLOW/STRAW     Appearance 09/06/2022 CLEAR     Specific gravity 09/06/2022 <1.005     pH (UA) 09/06/2022 5.5     Protein 09/06/2022 Negative     Glucose 09/06/2022 Negative     Ketone 09/06/2022 Negative     Bilirubin 09/06/2022 Negative     Blood 09/06/2022 Negative     Urobilinogen 09/06/2022 0.2     Nitrites 09/06/2022 Negative     Leukocyte Esterase 09/06/2022 Negative     WBC 09/06/2022 0-4     RBC 09/06/2022 0-5     Epithelial cells 09/06/2022 FEW     Bacteria 09/06/2022 Negative     Urine culture hold 09/06/2022 Urine on hold in Microbiology dept for 2 days. If unpreserved urine is submitted, it cannot be used for addtional testing after 24 hours, recollection will be required.      Lipase 09/06/2022 98     Magnesium 09/06/2022 1.9     Lactic Acid (POC) 09/06/2022 2.00     Special Requests: 09/06/2022 NO SPECIAL REQUESTS     Culture result: 09/06/2022 NO GROWTH 1 DAY     Sodium 09/07/2022 130 (A)    Potassium 09/07/2022 4.3     Chloride 09/07/2022 97     CO2 09/07/2022 24     Anion gap 09/07/2022 9     Glucose 09/07/2022 157 (A)    BUN 09/07/2022 37 (A)    Creatinine 09/07/2022 1.68 (A)    BUN/Creatinine ratio 09/07/2022 22 (A)    GFR est AA 09/07/2022 48 (A)    GFR est non-AA 09/07/2022 40 (A)    Calcium 09/07/2022 9.4     Bilirubin, total 09/07/2022 1.1 (A)    ALT (SGPT) 09/07/2022 37     AST (SGOT) 09/07/2022 15     Alk. phosphatase 09/07/2022 65     Protein, total 09/07/2022 6.5     Albumin 09/07/2022 3.2 (A)    Globulin 09/07/2022 3.3     A-G Ratio 09/07/2022 1.0 (A)    WBC 09/07/2022 17.4 (A)    RBC 09/07/2022 4.00 (A)    HGB 09/07/2022 13.0     HCT 09/07/2022 37.2     MCV 09/07/2022 93.0     MCH 09/07/2022 32.5     MCHC 09/07/2022 34.9     RDW 09/07/2022 16.1 (A)    PLATELET 35/66/2003 989     MPV 09/07/2022 11.2     NRBC 09/07/2022 0.0     ABSOLUTE NRBC 09/07/2022 0.00     NEUTROPHILS 09/07/2022 89 (A)    LYMPHOCYTES 09/07/2022 4 (A)    MONOCYTES 09/07/2022 6     EOSINOPHILS 09/07/2022 0     BASOPHILS 09/07/2022 0     IMMATURE GRANULOCYTES 09/07/2022 1 (A)    ABS. NEUTROPHILS 09/07/2022 15.5 (A)    ABS. LYMPHOCYTES 09/07/2022 0.7 (A)    ABS. MONOCYTES 09/07/2022 1.0     ABS. EOSINOPHILS 09/07/2022 0.0     ABS. BASOPHILS 09/07/2022 0.0     ABS. IMM. GRANS. 09/07/2022 0.2 (A)    DF 09/07/2022 SMEAR SCANNED     RBC COMMENTS 09/07/2022                      Value:ANISOCYTOSIS  1+      SAMPLES BEING HELD 09/06/2022 4 BC BOTTLES     COMMENT 09/06/2022 Add-on orders for these samples will be processed based on acceptable specimen integrity and analyte stability, which may vary by analyte.      SAMPLES BEING HELD 09/07/2022 1RED,1BLU     COMMENT 09/07/2022 Add-on orders for these samples will be processed based on acceptable specimen integrity and analyte stability, which may vary by analyte. Sodium 09/08/2022 135 (A)    Potassium 09/08/2022 3.6     Chloride 09/08/2022 102     CO2 09/08/2022 23     Anion gap 09/08/2022 10     Glucose 09/08/2022 104 (A)    BUN 09/08/2022 30 (A)    Creatinine 09/08/2022 1.03     BUN/Creatinine ratio 09/08/2022 29 (A)    GFR est AA 09/08/2022 >60     GFR est non-AA 09/08/2022 >60     Calcium 09/08/2022 8.9     Lactic acid 09/08/2022 0.8     SAMPLES BEING HELD 09/08/2022 1LAV     COMMENT 09/08/2022 Add-on orders for these samples will be processed based on acceptable specimen integrity and analyte stability, which may vary by analyte. Magnesium 09/08/2022 2.0     Phosphorus 09/08/2022 3.2        IMAGING:  XR ABD FLAT/ ERECT    Result Date: 9/7/2022  Enteric tube remains in position with no significant change in small bowel obstruction pattern     CT ABD PELV W CONT    Result Date: 9/6/2022  New mid small bowel mechanical obstruction. Nonspecific colitis of the right colon. No other significant change. Please refer to above findings for complete details     XR ABD PORT  1 V    Result Date: 9/6/2022  1. Gastric tube terminates in the proximal stomach with sidehole in the region of the GE junction. Recommend advancement. EKG:  No results found for this or any previous visit.           Meagan Hughes DO  9/8/2022 6:18 PM

## 2022-09-08 NOTE — PROGRESS NOTES
[facility] at [facility]  HD 3    Subjective     No abdominal pain, passing some gas, NG tube still with bilious output    Objective     Patient Vitals for the past 24 hrs:   Temp Pulse Resp BP SpO2   09/08/22 1303 98.6 °F (37 °C) (!) 103 20 126/71 99 %   09/08/22 1200 -- 90 -- -- --   09/08/22 1000 -- (!) 102 -- -- --   09/08/22 0922 98.1 °F (36.7 °C) (!) 104 20 128/74 99 %   09/08/22 0559 -- (!) 111 -- -- --   09/08/22 0427 98.8 °F (37.1 °C) (!) 111 20 131/78 100 %   09/08/22 0353 -- (!) 110 -- -- --   09/08/22 0156 -- (!) 109 -- -- --   09/08/22 0007 99.5 °F (37.5 °C) (!) 108 19 118/73 99 %   09/07/22 2200 -- (!) 105 -- -- --   09/07/22 2000 -- (!) 103 -- -- --   09/07/22 1952 99 °F (37.2 °C) (!) 112 19 118/69 99 %   09/07/22 1800 -- (!) 111 -- -- --   09/07/22 1653 97.5 °F (36.4 °C) (!) 108 18 117/78 96 %   09/07/22 1400 -- (!) 108 -- -- --       Date 09/07/22 0700 - 09/08/22 0659 09/08/22 0700 - 09/09/22 0659   Shift 3528-4446 2956-4793 24 Hour Total 9222-3738 1855-5471 24 Hour Total   INTAKE   Shift Total(mL/kg)         OUTPUT   Urine(mL/kg/hr) 400(0.5) 275(0.3) 675(0.4)        Urine Voided 400 275 675      Emesis/NG output 900  900 900  900     Output (ml) (Nasogastric Tube 09/06/22) 900  900 900  900   Shift Total(mL/kg) 1300(19.8) 275(4.2) 1575(23.9) 900(13.7)  900(13.7)   NET -1300 -275 -1575 -900  -900   Weight (kg) 65.8 65.8 65.8 65.8 65.8 65.8       PE  Pulm - CTAB  CV - RRR  Abd -soft, minimal distention, nontender to palpation bowel sounds hypoactive    Labs  Recent Results (from the past 12 hour(s))   METABOLIC PANEL, BASIC    Collection Time: 09/08/22  3:29 AM   Result Value Ref Range    Sodium 135 (L) 136 - 145 mmol/L    Potassium 3.6 3.5 - 5.1 mmol/L    Chloride 102 97 - 108 mmol/L    CO2 23 21 - 32 mmol/L    Anion gap 10 5 - 15 mmol/L    Glucose 104 (H) 65 - 100 mg/dL    BUN 30 (H) 6 - 20 MG/DL    Creatinine 1.03 0.70 - 1.30 MG/DL    BUN/Creatinine ratio 29 (H) 12 - 20      GFR est AA >60 >60 ml/min/1.73m2    GFR est non-AA >60 >60 ml/min/1.73m2    Calcium 8.9 8.5 - 10.1 MG/DL   LACTIC ACID    Collection Time: 09/08/22  3:29 AM   Result Value Ref Range    Lactic acid 0.8 0.4 - 2.0 MMOL/L   SAMPLES BEING HELD    Collection Time: 09/08/22  3:29 AM   Result Value Ref Range    SAMPLES BEING HELD 1LAV     COMMENT        Add-on orders for these samples will be processed based on acceptable specimen integrity and analyte stability, which may vary by analyte. MAGNESIUM    Collection Time: 09/08/22  3:29 AM   Result Value Ref Range    Magnesium 2.0 1.6 - 2.4 mg/dL   PHOSPHORUS    Collection Time: 09/08/22  3:29 AM   Result Value Ref Range    Phosphorus 3.2 2.6 - 4.7 MG/DL         Assessment     Nisa Claudio . is a 67 y. o.yr old male with stage IV pancreatic cancer, small bowel obstruction    Plan     He appears to have some level of obstruction still with NG tube output about 900 mL  He does appear to be comfortable not having any abdominal pain which is good  I do not see any signs of bowel ischemia  I appreciate RuffaloCODY helping with some direction of surgical versus nonsurgical here, he will come and matching this up with his wishes. Most likely if things did not progress we would likely not escalate going to surgery consider comfort type measures.   He may also need TPN but will defer to the primary team on timing of that if we were to continue maximal medical care  No other surgical changes at this point      Leslye Awad MD

## 2022-09-08 NOTE — PROGRESS NOTES
Bedside shift change report given to Hollie Dickerson (oncoming nurse) by Carolyn Rios RN (offgoing nurse). Report included the following information SBAR and Kardex.

## 2022-09-08 NOTE — PROGRESS NOTES
Problem: Falls - Risk of  Goal: *Absence of Falls  Description: Document Bolivar Bess Fall Risk and appropriate interventions in the flowsheet.   Outcome: Progressing Towards Goal  Note: Fall Risk Interventions:  Mobility Interventions: Communicate number of staff needed for ambulation/transfer, Patient to call before getting OOB              Elimination Interventions: Call light in reach

## 2022-09-08 NOTE — PROGRESS NOTES
[facility] [place] at [place] [address] [address]ia [address], [address], [address] [place] [address]  W: [phone]  F: [phone]    Reason for evaluation   Aileen Pastor is a 67 y.o. male who is seen for new patient evaluation during admission for SBO, known h/o Pancreatic adenocarcinoma - stage IV     History of Present Illness:   Patient is a 67 y.o. male known to me for stage IV pancreatic adenocarcinoma, ITP who started second line FOLFOX on 8/16/2022 is admitted on 9/7/2022 with c/p progressive nausea, abdominal pain, emesis x 1 week. CT 9/7/2022 showed small Bowel obstruction , stable disease. He has an NG tibe in place with improved pain, no bleeding, has constipation and no fevers.       Treatment History:   6/7/2022- Gemzar and abraxane   8/2022: CT with progression  8/16/2022: FOLFOX      Past Medical History:   Diagnosis Date    Claudication [facility])     Constipation 10/22/2012    Glaucoma     Hypercholesterolemia     Hypertension     Pancreatitis 10/2012    Prostate cancer [facility])     s/p prostatectomy    Thrombocytopenia ([address] 7/27/2010      Past Surgical History:   Procedure Laterality Date    ENDOSCOPY, COLON, DIAGNOSTIC  1999    Normal; Dr. Allison Ludwig  04/2017    HX ORTHOPAEDIC      right wrist surgery    HX OTHER SURGICAL      torn cartridge in L knee    HX PROSTATECTOMY  2005    IR INSERT TUNL CVC W PORT OVER 5 YEARS  6/3/2022      Social History     Tobacco Use    Smoking status: Never     Passive exposure: Yes    Smokeless tobacco: Never   Substance Use Topics    Alcohol use: No     Comment: ocassionally      Family History   Problem Relation Age of Onset    Cancer Mother     Hypertension Mother     Hypertension Brother     Heart Disease Father     Hypertension Brother      Current Facility-Administered Medications   Medication Dose Route Frequency    phenol throat spray (CHLORASEPTIC) 1 Spray  1 Spray Oral PRN    benzocaine-menthoL (CHLORASEPTIC MAX) lozenge 1 Lozenge  1 Lozenge Oral Q2H PRN    HYDROmorphone (DILAUDID) injection 0.5 mg  0.5 mg IntraVENous Q3H PRN    lactated Ringers infusion  125 mL/hr IntraVENous CONTINUOUS    sodium chloride (NS) flush 5-40 mL  5-40 mL IntraVENous Q8H    sodium chloride (NS) flush 5-40 mL  5-40 mL IntraVENous PRN    acetaminophen (TYLENOL) tablet 650 mg  650 mg Oral Q6H PRN    Or    acetaminophen (TYLENOL) suppository 650 mg  650 mg Rectal Q6H PRN    polyethylene glycol (MIRALAX) packet 17 g  17 g Oral DAILY PRN    ondansetron (ZOFRAN ODT) tablet 4 mg  4 mg Oral Q8H PRN    Or    ondansetron (ZOFRAN) injection 4 mg  4 mg IntraVENous Q6H PRN    piperacillin-tazobactam (ZOSYN) 3.375 g in 0.9% sodium chloride (MBP/ADV) 100 mL MBP  3.375 g IntraVENous Q8H      Allergies   Allergen Reactions    Shellfish Derived Swelling    Zoloft [Sertraline] Other (comments)     Insomnia        Review of Systems: A complete review of systems was obtained, negative except as described above. Physical Exam:     Visit Vitals  /74 (BP 1 Location: Left upper arm, BP Patient Position: At rest)   Pulse 90   Temp 98.1 °F (36.7 °C)   Resp 20   Ht 5' 10\" (1.778 m)   Wt 145 lb (65.8 kg)   SpO2 99%   BMI 20.81 kg/m²       ECOG PS: 2  General: NG in place , bilious drainage  Eyes: PERRL, anicteric sclerae  HENT: Dry  Neck: Supple  Respiratory: normal respiratory effort  CV: Normal rate, no peripheral edema  GI: distended, Supraumbilical palpable ill defined non tender area , firm, perhaps 2 cm   Psych: Alert, oriented, appropriate affect, normal judgment/insight    Results:     Lab Results   Component Value Date/Time    WBC 17.4 (H) 09/07/2022 04:00 AM    HGB 13.0 09/07/2022 04:00 AM    HCT 37.2 09/07/2022 04:00 AM    PLATELET 599 38/45/8858 04:00 AM    MCV 93.0 09/07/2022 04:00 AM    ABS.  NEUTROPHILS 15.5 (H) 09/07/2022 04:00 AM     Lab Results   Component Value Date/Time    Sodium 135 (L) 09/08/2022 03:29 AM    Potassium 3.6 09/08/2022 03:29 AM    Chloride 102 09/08/2022 03:29 AM    CO2 23 09/08/2022 03:29 AM    Glucose 104 (H) 09/08/2022 03:29 AM    BUN 30 (H) 09/08/2022 03:29 AM    Creatinine 1.03 09/08/2022 03:29 AM    GFR est AA >60 09/08/2022 03:29 AM    GFR est non-AA >60 09/08/2022 03:29 AM    Calcium 8.9 09/08/2022 03:29 AM    Glucose (POC) 141 (H) 05/19/2022 10:45 AM     Lab Results   Component Value Date/Time    Bilirubin, total 1.1 (H) 09/07/2022 04:00 AM    ALT (SGPT) 37 09/07/2022 04:00 AM    Alk. phosphatase 65 09/07/2022 04:00 AM    Protein, total 6.5 09/07/2022 04:00 AM    Albumin 3.2 (L) 09/07/2022 04:00 AM    Globulin 3.3 09/07/2022 04:00 AM       Lab Results   Component Value Date/Time    Lipase 98 09/06/2022 05:15 PM       Lab Results   Component Value Date/Time    INR 1.0 04/05/2010 08:43 AM    aPTT 29.3 04/05/2010 08:43 AM    D-dimer 2.51 (H) 08/07/2022 02:34 PM     Prostate Specific Ag   Date Value   05/16/2022 <0.1 ng/mL   04/04/2017 <0.1 ng/mL   02/23/2012 0.7 ng/mL   10/13/2009 0.4 NG/ML     CA 19-9:  Recent Labs     08/16/22  0912 05/16/22  0906   C199LT 6 <2       Records reviewed and summarized above. Pathology report(s) reviewed above. Radiology report(s) reviewed above. 1. There are 2 pancreatic mass lesions suspicious for neoplasm with features  suggestive of adenocarcinoma with lesion in the pancreatic body abutting the  celiac axis and potentially occluding left gastric artery and likely responsible  for 5 mm dilation of pancreatic duct into the pancreatic tail. 2. 1.4 x 1.7 cm left periaortic lymph node and 1.1 x 1.6 cm right common iliac  node concerning for metastatic neoplasm, possibly prostatic in this patient with  history of prostate cancer and prostatectomy. 3. No MRI evidence of other metastatic disease    PET CT 5/19/2022    IMPRESSION  1. RML lesion, indeterminate. 2. LLL punctate nodule, indeterminate. 3. Two pancreatic masses. 4. Two abdominal midline wall tumor deposits.   5. Metastatic retroperitoneal nodes.  6. Peritoneal tumor implants. 7/28/2022 CT WO CONTRAST    IMPRESSION     1.  2 separate pancreatic masses as noted previously which are not significantly  changed in size. 2.  Multiple pulmonary nodules as described. The larger lesions are unchanged. Smaller lesions may been present on the head CT but too difficult to visualize  given the resolution of the CT images. Attention on follow-up imaging. 3.  Increased size of prevascular lymph node anterior to the aortic bifurcation  consistent with progression of disease. 4.  Questionable new hypodensity in the liver. If clinically indicated, CT or  MRI with contrast should be obtained. 5.  Sclerotic lesion in the right iliac bone unchanged. This was not  metabolically active on the prior PET/CT and is of uncertain clinical  significance. CT with contrast repeated 8/11/2022     IMPRESSION  Metastatic pancreatic malignancy with stable small nodules at the lung bases,  multiple liver metastases, multiple peritoneal and abdominal wall metastases,  left para-aortic retroperitoneal metastasis    CT 9/7/2022    IMPRESSION     New mid small bowel mechanical obstruction. Nonspecific colitis of the right  colon. No other significant change. Please refer to above findings for complete  details     Assessment:   1) Pancreatic masses-Adenocarcinoma- stage IV   Ambry genetics- negative     Presented to with epigastric pain. Noted to have 2 adjustment masses in the pancreatic body and tail measuring about 3.6 to 3.9 cm. Mass is about the celiac plexus and splenic artery. Scan showed a 1.4 x 1.7 cm left periaortic lymph node as well as a 1.1 x 1.6 cm right common iliac node. PET CT reviewed and noted tumor deposits, metastatic RP nodes and peritoneal carcinomatosis    Consistent with pancreatic cancer, stage IV  Progressed on [place] abraxane  Started palliative FOLFOX 8/2022.    Stable scans 9/7 but has SBO  Likely from known carcinomatosis  Discussed that if this resolved on supportive management we hope to resume FOLFOX  However if that is not the case he is not inclined toward palliative surgery and we did not recommend that either. Out goal would be to focus on comfort in that situation. 2) history of prostate cancer  [place] 3+3 status post prostatectomy 2005  Recurrent disease [place] 3+4 in 2016 status postradiation with undetectable PSA    3) SBO  Appreciate Surgery  Supportive management     4) H/O ITP    PLAN    Continue supportive management  Consult palliative care   Dexamethasone on days 2 and 3   Will follow     I appreciate the opportunity to participate in Mr. Gomes Sara Sr.'s care.     Signed By: Geovanny Claudio MD

## 2022-09-08 NOTE — CONSULTS
[address] [place] [address]: [phone] ([phone])    Patient Name: Maicol Carrillo. YOB: 1945    Date of Initial Consult: 9/8/22  Reason for Consult: overwhelming sx  Requesting Provider: Damaso Felix    Primary Care Physician: Sheng Pedroza MD     SUMMARY:   Maicol Carrillo. is a 67 y.o. with a past history of stage IV pancreatic cancer on palliative chemo w/ Dr Ewa Arthur (8/2022), hx of prostate cancer, HTN  who was admitted on 9/6/2022 from home w/ N/V, decr BMs x 1 week. Found to have new SBO- surgery consulted, decompressed w/ NGT. Concerned for obstruction due to cancer, poor surgical candidate due to current chemo. on IV abx empirically, CT also showing colitis. Current medical issues leading to Palliative Medicine involvement include: overwhelming sx. Social: Pt  to Oscar Chavarria who has lung cancer and undergoing her own treatments (Oncology at [facility]). He is able to do ADLs but their son Anna Kirkland moved in to help about a year ago and is the child most involved w/ health care issues. Pt and wife have 5 children total, 3 local.      PALLIATIVE DIAGNOSES:   Nausea/vomiting- improved w/ NGT decompression  Generalized weakness due to cancer, chemo and sx   Palliative care encounter/goals of care        PLAN:   Meet w/ pt who likes to have son Anna Kirkland at bedside when medical team comes. Matteo Arthur had referred to our clinic, however as pt tolerating chemo and w/out sx decision made to hold off on appointments. Pt w/ improved sx w/ NGT and output seems to be decreasing some the past shift- he has already held conversations with Toshia Arthur and James Flores that if SBO does not improve w/ conservative measures, would not be good surgical candidate. He understands that this means we would be talking more about hospice care. Also aware that a malignant SBO could occur again in future.    Goals clear at this time for ongoing measures, hope is that SOB resolves and pt can resume palliative chemo. Discuss code status and AMD/living will. They think this is a good idea to complete and to have ongoing discussions about decisions around end of life- for now remains full code but open to me revisiting. Sx: Sx managed at this time although pt has not slept well since admission, mainly to noise in the hospital and his room. Trial of Ativan 0.5mg oral liquid bedtime prn. Discussed w/ attending, transfer order placed to [address], d/c tele. Initial consult note routed to primary continuity provider and/or primary health care team members  Communicated plan of care with: Palliative IDT, [address] Team incl Dr Marshal Wagner / TREATMENT PREFERENCES:     GOALS OF CARE:  Patient/Health Care Proxy Stated Goals: Prolong life    TREATMENT PREFERENCES:   Code Status: Full Code    Patient and family's personal goals include: to improve from acute issues       Advance Care Planning:  [x] The [address] [facility] Interdisciplinary Team has updated the ACP Navigator with [address] and Patient Capacity    To complete when pt ready     Advance Care Planning 8/30/2022   Patient's Healthcare Decision Maker is: Legal Next [facility] [address] Directive None   Patient Would Like to Complete Advance Directive No       Medical Interventions: Full interventions       Other:    As far as possible, the palliative care team has discussed with patient / health care proxy about goals of care / treatment preferences for patient. HISTORY:     History obtained from: pt, family, chart, staff     CHIEF COMPLAINT: \"I can't sleep\"    HPI/SUBJECTIVE:    The patient is:   [x] Verbal and participatory  [] Non-participatory due to:     Pt slightly upset that things were so noisy in his room last night (double room) and has not been able to rest/sleep. With the NGT in place does not have N/V and never has had pain.       Clinical Pain Assessment (nonverbal scale for severity on nonverbal patients):   Clinical Pain Assessment  Severity: 0          Duration: for how long has pt been experiencing pain (e.g., 2 days, 1 month, years)  Frequency: how often pain is an issue (e.g., several times per day, once every few days, constant)     FUNCTIONAL ASSESSMENT:     Palliative Performance Scale (PPS):  PPS: 70       PSYCHOSOCIAL/SPIRITUAL SCREENING:     Palliative IDT has assessed this patient for cultural preferences / practices and a referral made as appropriate to needs (Cultural Services, Patient Advocacy, Ethics, etc.)    Any spiritual / Moravian concerns:  [] Yes /  [x] No   If \"Yes\" to discuss with pastoral care during IDT     Does caregiver feel burdened by caring for their loved one:   [] Yes /  [x] No /  [] No Caregiver Present/Available [] No Caregiver [] Pt [facility]  If \"Yes\" to discuss with social work during IDT    Anticipatory grief assessment:   [x] Normal  / [] Maladaptive     If \"Maladaptive\" to discuss with social work during IDT    ESAS Anxiety: Anxiety: 0    ESAS Depression: Depression: 0        REVIEW OF SYSTEMS:     Positive and pertinent negative findings in ROS are noted above in HPI. The following systems were [x] reviewed / [] unable to be reviewed as noted in HPI  Other findings are noted below. Systems: constitutional, ears/nose/mouth/throat, respiratory, gastrointestinal, genitourinary, musculoskeletal, integumentary, neurologic, psychiatric, endocrine. Positive findings noted below. Modified ESAS Completed by: provider   Fatigue: 5 Drowsiness: 0   Depression: 0 Pain: 0   Anxiety: 0 Nausea: 0   Anorexia: 10 Dyspnea: 0                    PHYSICAL EXAM:     From RN flowsheet:  Wt Readings from Last 3 Encounters:   09/06/22 145 lb (65.8 kg)   08/30/22 140 lb (63.5 kg)   08/16/22 145 lb (65.8 kg)     Blood pressure 122/73, pulse (!) 127, temperature 98.8 °F (37.1 °C), resp.  rate 21, height 5' 10\" (1.778 m), weight 145 lb (65.8 kg), SpO2 99 %. Pain Scale 1: Numeric (0 - 10)  Pain Intensity 1: 0                 Last bowel movement, if known:     Constitutional: awake, alert, oriented, no sedation or confusion   Eyes: pupils equal, anicteric  ENMT: no nasal discharge,NGT   Respiratory: breathing not labored  Musculoskeletal: no deformity  Skin: warm, dry  Neurologic: following commands, moving all extremities  Psychiatric: full affect, no hallucinations         HISTORY:     Active Problems:    SBO (small bowel obstruction) ([address] (9/6/2022)    Past Medical History:   Diagnosis Date    Claudication ([address]     Constipation 10/22/2012    Glaucoma     Hypercholesterolemia     Hypertension     Pancreatitis 10/2012    Pro[facility] [address]     s/p prostatectomy    Thrombocytopenia ([facility] 7/27/2010      Past Surgical History:   Procedure Laterality Date    ENDOSCOPY, COLON, DIAGNOSTIC  1999    Normal; Dr. Isaías Lim  04/2017    HX ORTHOPAEDIC      right wrist surgery    HX OTHER SURGICAL      torn cartridge in L knee    HX PROSTATECTOMY  2005    IR INSERT TUNL CVC W PORT OVER 5 YEARS  6/3/2022      Family History   Problem Relation Age of Onset    Cancer Mother     Hypertension Mother     Hypertension Brother     Heart Disease Father     Hypertension Brother       History reviewed, no pertinent family history.   Social History     Tobacco Use    Smoking status: Never     Passive exposure: Yes    Smokeless tobacco: Never   Substance Use Topics    Alcohol use: No     Comment: ocassionally     Allergies   Allergen Reactions    Shellfish Derived Swelling    Zoloft [Sertraline] Other (comments)     Insomnia      Current Facility-Administered Medications   Medication Dose Route Frequency    potassium chloride 10 mEq in 100 ml IVPB  10 mEq IntraVENous Q1H    magnesium sulfate 2 g/50 ml IVPB (premix or compounded)  2 g IntraVENous ONCE    LORazepam (INTENSOL) 2 mg/mL oral concentrate 0.5 mg  0.5 mg Oral QHS PRN    gabapentin (NEURONTIN) capsule 100 mg  100 mg Oral TID    phenol throat spray (CHLORASEPTIC) 1 Spray  1 Spray Oral PRN    benzocaine-menthoL (CHLORASEPTIC MAX) lozenge 1 Lozenge  1 Lozenge Oral Q2H PRN    HYDROmorphone (DILAUDID) injection 0.5 mg  0.5 mg IntraVENous Q3H PRN    lactated Ringers infusion  125 mL/hr IntraVENous CONTINUOUS    sodium chloride (NS) flush 5-40 mL  5-40 mL IntraVENous Q8H    sodium chloride (NS) flush 5-40 mL  5-40 mL IntraVENous PRN    acetaminophen (TYLENOL) tablet 650 mg  650 mg Oral Q6H PRN    Or    acetaminophen (TYLENOL) suppository 650 mg  650 mg Rectal Q6H PRN    polyethylene glycol (MIRALAX) packet 17 g  17 g Oral DAILY PRN    ondansetron (ZOFRAN ODT) tablet 4 mg  4 mg Oral Q8H PRN    Or    ondansetron (ZOFRAN) injection 4 mg  4 mg IntraVENous Q6H PRN    piperacillin-tazobactam (ZOSYN) 3.375 g in 0.9% sodium chloride (MBP/ADV) 100 mL MBP  3.375 g IntraVENous Q8H          LAB AND IMAGING FINDINGS:     Lab Results   Component Value Date/Time    WBC 10.5 09/09/2022 05:45 AM    HGB 10.5 (L) 09/09/2022 05:45 AM    PLATELET 255 30/82/0269 05:45 AM     Lab Results   Component Value Date/Time    Sodium 139 09/09/2022 05:45 AM    Potassium 3.4 (L) 09/09/2022 05:45 AM    Chloride 103 09/09/2022 05:45 AM    CO2 24 09/09/2022 05:45 AM    BUN 25 (H) 09/09/2022 05:45 AM    Creatinine 0.93 09/09/2022 05:45 AM    Calcium 9.1 09/09/2022 05:45 AM    Magnesium 1.9 09/09/2022 05:45 AM    Phosphorus 2.6 09/09/2022 05:45 AM      Lab Results   Component Value Date/Time    Alk.  phosphatase 65 09/07/2022 04:00 AM    Protein, total 6.5 09/07/2022 04:00 AM    Albumin 3.2 (L) 09/07/2022 04:00 AM    Globulin 3.3 09/07/2022 04:00 AM     Lab Results   Component Value Date/Time    INR 1.0 04/05/2010 08:43 AM    Prothrombin time 10.3 04/05/2010 08:43 AM    aPTT 29.3 04/05/2010 08:43 AM      No results found for: IRON, FE, TIBC, IBCT, PSAT, FERR   No results found for: PH, PCO2, PO2  No components found for: GLPOC   No results found for: CPK, CKMB             Total time: 50 min   Counseling / coordination time, spent as noted above: 35 min   > 50% counseling / coordination?: yes    Prolonged service was provided for  []30 min   []75 min in face to face time in the presence of the patient, spent as noted above. Time Start:   Time End:   Note: this can only be billed with 72180 (initial) or 19009 (follow up). If multiple start / stop times, list each separately.

## 2022-09-08 NOTE — PROGRESS NOTES
Problem: Self Care Deficits Care Plan (Adult)  Goal: *Acute Goals and Plan of Care (Insert Text)  Description: FUNCTIONAL STATUS PRIOR TO ADMISSION: Patient was independent and active without use of DME, was independent for basic and instrumental ADLs. HOME SUPPORT: The patient lived with his wife and son but did not require assist.    Occupational Therapy Goals  Initiated 9/7/2022  1. Patient will perform grooming at the sink with supervision/set-up within 7 day(s). 2.  Patient will perform bathing with supervision/set-up within 7 day(s). 3.  Patient will perform upper and lower body dressing with supervision/set-up within 7 day(s). 4.  Patient will perform toilet transfers with supervision/set-up within 7 day(s). 5.  Patient will perform all aspects of toileting with supervision/set-up within 7 day(s). 6.  Patient will participate in upper extremity therapeutic exercise/activities with supervision/set-up for 10 minutes within 7 day(s). 7.  Patient will utilize energy conservation techniques during functional activities with verbal and visual cues within 7 day(s). Outcome: Progressing Towards Goal   OCCUPATIONAL THERAPY TREATMENT  Patient: Fabien Cloud Sr. (78 y.o. male)  Date: 9/8/2022  Diagnosis: SBO ([facility] ([facility] [K56.609] <principal problem not specified>      Precautions: Fall (NPO)  Chart, occupational therapy assessment, plan of care, and goals were reviewed. ASSESSMENT  Patient continues with skilled OT services and is progressing towards goals. Pt received supine in bed, alert and oriented x4. Pt agreeable to EOB exercises. Pt completed bed mobility with supervision and demonstrated intact sitting balance. Pt tolerated AROM/AAROM exercises, AAROM needed for LUE shoulder flexion overhead due to limited AROM past ~30*; pt reports arthritis. Pt tolerated seated exercises, however, noted fatigue and generalized weakness.  Pt performed sit<>stand with SBA and able to side step at EOB with CGA (limited by NGT suction to wall). Pt remained sitting EOB following activity with ice chips and call bell in reach. Current Level of Function Impacting Discharge (ADLs): supervision/set-up to CGA    Other factors to consider for discharge: NGT at this time         PLAN :  Patient continues to benefit from skilled intervention to address the above impairments. Continue treatment per established plan of care to address goals. Recommend with staff: OOB 3 x day or EOB    Recommend next OT session: bathroom mobility, standing tolerance    Recommendation for discharge: (in order for the patient to meet his/her long term goals)  Occupational therapy at least 2 days/week in the home     This discharge recommendation:  Has not yet been discussed the attending provider and/or case management    IF patient discharges home will need the following DME: TBD       SUBJECTIVE:   Patient stated I would like to stay sitting on the edge of the bed. Can you ask the nurse for my hiccup medicine?     OBJECTIVE DATA SUMMARY:   Cognitive/Behavioral Status:  Neurologic State: Alert; Appropriate for age  Orientation Level: Oriented X4  Cognition: Follows commands; Appropriate safety awareness; Appropriate for age attention/concentration; Appropriate decision making  Perception: Appears intact  Perseveration: No perseveration noted  Safety/Judgement: Awareness of environment; Fall prevention;Good awareness of safety precautions; Home safety; Insight into deficits    Functional Mobility and Transfers for ADLs:  Bed Mobility:  Supine to Sit: Supervision    Transfers:  Sit to Stand: Stand-by assistance          Balance:  Sitting: Intact  Standing: Impaired; Without support  Standing - Static: Good    ADL Intervention:  Feeding  Food to Mouth: Independent (ice chips)                                       Cognitive Retraining  Safety/Judgement: Awareness of environment; Fall prevention;Good awareness of safety precautions; Home safety; Insight into deficits    Therapeutic Exercises:   Pt tolerated AAROM/AROM of BUEs seated EOB. 2 sets of 15 AAROM shoulder flexion overhead; lateral pull backs 15 reps; ~2# resistive shoulder flexion with AAROM increased assists for LUE. Pain:  No c/o pain    Activity Tolerance:   Fair    After treatment patient left in no apparent distress:   Call bell within reach, Side rails x 3, and sitting EOB    COMMUNICATION/COLLABORATION:   The patients plan of care was discussed with: Registered nurse.      [place] ANIBAL Ramirez  Time Calculation: 12 mins

## 2022-09-09 NOTE — PROGRESS NOTES
PICC Placement Note    PRE-PROCEDURE VERIFICATION  Correct Procedure: yes  Correct Site:  yes  Temperature: Temp: 98.1 °F (36.7 °C), Temperature Source: Temp Source: Oral  Recent Labs     09/09/22  0545   BUN 25*   CREA 0.93      WBC 10.5     Allergies: Shellfish derived and Zoloft [sertraline]  Education materials, including PICC Booklet, for PICC Care given to patient: yes. See Patient Education activity for further details. PROCEDURE DETAIL  A double lumen PICC line was started for TPN. The following documentation is in addition to the PICC properties in the lines/airways flowsheet :  Lot #: [id number]  Was xylocaine 1% used intradermally:  yes  Total catheter length: 40 (cm)  External catheter length:  0 (cm)  Vein Selection for PICC:right brachial  Central Line Bundle followed yes  Complication Related to Insertion: none    The placement was verified by ECG/Sapiens technology: The  tip location is in the superior vena cava. See ECG results for PICC tip placement. Report given to nurse Javed. Line is okay to use.     Marycarmen Granados RN

## 2022-09-09 NOTE — PROGRESS NOTES
[facility] Progress Note    INTERVAL HISTORY  / Subjective: Follow up SBO. Patient seen and examined. No pain, +flatus, no bowel movement. Still with bilious output through NG tube. Bowel sounds are improved. Assessment / Plan:  Admission History:  67 y.o. male with history of stage IV pancreatic cancer, history of prostate cancer, hypertension, dyslipidemia who presents to hospital with complaints of  Nausea and vomiting x1 week. .  Reports near daily bowel movements but has not had one in last 3 days. Aching, generalized, nonradiating abdominal pain which is since resolved since placement of NG tube. Associated nausea without emesis. The patient denies any fever, chills, chest pain,vomiting, cough, congestion, recent illness, palpitations, or dysuria. Remarkable vitals on ER Presentations: Heart rate to 122  Labs Remarkable for: WBC 14, sodium 127, glucose 202, creatinine 1.4  ER Images: New mid small bowel mechanical obstruction. Nonspecific colitis of the right  colon. Plan  Small bowel obstruction- persistent  -Suspected mechanical obstruction in setting of pancreatic cancer  -Conservative management   -General surgery following  -Keep NG tube to suction  -Cautious volume resuscitation. Continue IVFs  -Monitor on telemetry  -Keep N.p.o.  -With slow improvement, will initiate TPN. Place PICC. Nutrition consult    History of stage IV pancreatic cancer  -Oncology consulted. Recommend conservative management  -Appreciate palliative care.  Will continue to follow along    Hiccups:   -trial gabapentin      Acute colitis  -continue empiric Zosyn  -Send stool studies if obtainable     Hypertension  -Hold BP lowering agents given labile Bps    RENETTA, Hyponatremia, and Dehydration  --LR 125CC/hr      NIDDM II  -SSI +Hypoglycemic protocols     Obesity  -Counseled on weight loss, dieting and exercise      Objective:  Visit Vitals  /73 (BP 1 Location: Left upper arm, BP Patient Position: At rest)   Pulse (!) 107   Temp 98.8 °F (37.1 °C)   Resp 21   Ht 5' 10\" (1.778 m)   Wt 65.8 kg (145 lb)   SpO2 99%   BMI 20.81 kg/m²                 Physical Exam:  General: Thin elderly appearing man   Neck: Supple, no lymphadenopathy, no JVD   Lungs: Clear to auscultation bilaterally , no increase work of breathing  Cardiovascular: Regular rate and rhythm with normal S1 and S2   Abdomen: Soft, nontender, nondistended, normoactive bowels sounds   Extremities: No cyanosis clubbing or edema   Neuro: Nonfocal, A&O x3   Psych: Normal affect     Intake and Output:  Date 09/08/22 0700 - 09/09/22 0659 09/09/22 0700 - 09/10/22 0659   Shift 2134-9158 8568-0820 24 Hour Total 7826-6269 0470-0526 24 Hour Total   INTAKE   Shift Total(mL/kg)         OUTPUT   Urine(mL/kg/hr)           Urine Occurrence(s)  1 x 1 x      Emesis/NG output  300  300     Output (ml) (Nasogastric Tube 09/06/22)  300  300   Shift Total(mL/kg) 900(13.7) 450(6.8) 1350(20.5) 300(4.6)  300(4.6)   NET -900 -450 -1350 -300  -300   Weight (kg) 65.8 65.8 65.8 65.8 65.8 65.8         LAB:  Admission on 09/06/2022   Component Date Value    WBC 09/06/2022 14.0 (A)    RBC 09/06/2022 4.10     HGB 09/06/2022 13.3     HCT 09/06/2022 38.0     MCV 09/06/2022 92.7     MCH 09/06/2022 32.4     MCHC 09/06/2022 35.0     RDW 09/06/2022 16.0 (A)    PLATELET 34/59/0360 888     MPV 09/06/2022 10.5     NRBC 09/06/2022 0.3 (A)    ABSOLUTE NRBC 09/06/2022 0.04 (A)    NEUTROPHILS 09/06/2022 89 (A)    LYMPHOCYTES 09/06/2022 5 (A)    MONOCYTES 09/06/2022 5     EOSINOPHILS 09/06/2022 0     BASOPHILS 09/06/2022 0     IMMATURE GRANULOCYTES 09/06/2022 1 (A)    ABS. NEUTROPHILS 09/06/2022 12.5 (A)    ABS. LYMPHOCYTES 09/06/2022 0.7 (A)    ABS. MONOCYTES 09/06/2022 0.7     ABS. EOSINOPHILS 09/06/2022 0.0     ABS. BASOPHILS 09/06/2022 0.0     ABS. IMM.  GRANS. 09/06/2022 0.1 (A)    DF 09/06/2022 AUTOMATED     RBC COMMENTS 09/06/2022 Value: ANISOCYTOSIS  1+      Sodium 09/06/2022 127 (A)    Potassium 09/06/2022 4.3     Chloride 09/06/2022 95 (A)    CO2 09/06/2022 22     Anion gap 09/06/2022 10     Glucose 09/06/2022 202 (A)    BUN 09/06/2022 35 (A)    Creatinine 09/06/2022 1.48 (A)    BUN/Creatinine ratio 09/06/2022 24 (A)    GFR est AA 09/06/2022 56 (A)    GFR est non-AA 09/06/2022 46 (A)    Calcium 09/06/2022 9.6     Bilirubin, total 09/06/2022 0.8     ALT (SGPT) 09/06/2022 39     AST (SGOT) 09/06/2022 18     Alk. phosphatase 09/06/2022 68     Protein, total 09/06/2022 6.7     Albumin 09/06/2022 3.5     Globulin 09/06/2022 3.2     A-G Ratio 09/06/2022 1.1     SAMPLES BEING HELD 09/06/2022 1GREEN 1BLUE 1RED     COMMENT 09/06/2022 Add-on orders for these samples will be processed based on acceptable specimen integrity and analyte stability, which may vary by analyte.      Ventricular Rate 09/06/2022 118     Atrial Rate 09/06/2022 118     P-R Interval 09/06/2022 124     QRS Duration 09/06/2022 84     Q-T Interval 09/06/2022 330     QTC Calculation (Bezet) 09/06/2022 462     Calculated P Axis 09/06/2022 66     Calculated R Axis 09/06/2022 -21     Calculated T Axis 09/06/2022 54     Diagnosis 09/06/2022                      Value:Sinus tachycardia  Nonspecific ST and T wave abnormality  Abnormal ECG  When compared with ECG of 07-AUG-2022 14:26,  No significant change was found  Confirmed by Jodie Moss M.D., [facility] ([id number]) on 9/7/2022 8:57:58 AM      Color 09/06/2022 YELLOW/STRAW     Appearance 09/06/2022 CLEAR     Specific gravity 09/06/2022 <1.005     pH (UA) 09/06/2022 5.5     Protein 09/06/2022 Negative     Glucose 09/06/2022 Negative     Ketone 09/06/2022 Negative     Bilirubin 09/06/2022 Negative     Blood 09/06/2022 Negative     Urobilinogen 09/06/2022 0.2     Nitrites 09/06/2022 Negative     Leukocyte Esterase 09/06/2022 Negative     WBC 09/06/2022 0-4     RBC 09/06/2022 0-5     Epithelial cells 09/06/2022 FEW     Bacteria 09/06/2022 Negative Urine culture hold 09/06/2022 Urine on hold in Microbiology dept for 2 days. If unpreserved urine is submitted, it cannot be used for addtional testing after 24 hours, recollection will be required. Lipase 09/06/2022 98     Magnesium 09/06/2022 1.9     Lactic Acid (POC) 09/06/2022 2.00     Special Requests: 09/06/2022 NO SPECIAL REQUESTS     Culture result: 09/06/2022 NO GROWTH 2 DAYS     Sodium 09/07/2022 130 (A)    Potassium 09/07/2022 4.3     Chloride 09/07/2022 97     CO2 09/07/2022 24     Anion gap 09/07/2022 9     Glucose 09/07/2022 157 (A)    BUN 09/07/2022 37 (A)    Creatinine 09/07/2022 1.68 (A)    BUN/Creatinine ratio 09/07/2022 22 (A)    GFR est AA 09/07/2022 48 (A)    GFR est non-AA 09/07/2022 40 (A)    Calcium 09/07/2022 9.4     Bilirubin, total 09/07/2022 1.1 (A)    ALT (SGPT) 09/07/2022 37     AST (SGOT) 09/07/2022 15     Alk. phosphatase 09/07/2022 65     Protein, total 09/07/2022 6.5     Albumin 09/07/2022 3.2 (A)    Globulin 09/07/2022 3.3     A-G Ratio 09/07/2022 1.0 (A)    WBC 09/07/2022 17.4 (A)    RBC 09/07/2022 4.00 (A)    HGB 09/07/2022 13.0     HCT 09/07/2022 37.2     MCV 09/07/2022 93.0     MCH 09/07/2022 32.5     MCHC 09/07/2022 34.9     RDW 09/07/2022 16.1 (A)    PLATELET 86/33/0740 894     MPV 09/07/2022 11.2     NRBC 09/07/2022 0.0     ABSOLUTE NRBC 09/07/2022 0.00     NEUTROPHILS 09/07/2022 89 (A)    LYMPHOCYTES 09/07/2022 4 (A)    MONOCYTES 09/07/2022 6     EOSINOPHILS 09/07/2022 0     BASOPHILS 09/07/2022 0     IMMATURE GRANULOCYTES 09/07/2022 1 (A)    ABS. NEUTROPHILS 09/07/2022 15.5 (A)    ABS. LYMPHOCYTES 09/07/2022 0.7 (A)    ABS. MONOCYTES 09/07/2022 1.0     ABS. EOSINOPHILS 09/07/2022 0.0     ABS. BASOPHILS 09/07/2022 0.0     ABS. IMM.  GRANS. 09/07/2022 0.2 (A)    DF 09/07/2022 SMEAR SCANNED     RBC COMMENTS 09/07/2022                      Value:ANISOCYTOSIS  1+      SAMPLES BEING HELD 09/06/2022 4 BC BOTTLES     COMMENT 09/06/2022 Add-on orders for these samples will be processed based on acceptable specimen integrity and analyte stability, which may vary by analyte. SAMPLES BEING HELD 09/07/2022 1RED,1BLU     COMMENT 09/07/2022 Add-on orders for these samples will be processed based on acceptable specimen integrity and analyte stability, which may vary by analyte. Sodium 09/08/2022 135 (A)    Potassium 09/08/2022 3.6     Chloride 09/08/2022 102     CO2 09/08/2022 23     Anion gap 09/08/2022 10     Glucose 09/08/2022 104 (A)    BUN 09/08/2022 30 (A)    Creatinine 09/08/2022 1.03     BUN/Creatinine ratio 09/08/2022 29 (A)    GFR est AA 09/08/2022 >60     GFR est non-AA 09/08/2022 >60     Calcium 09/08/2022 8.9     Lactic acid 09/08/2022 0.8     SAMPLES BEING HELD 09/08/2022 1LAV     COMMENT 09/08/2022 Add-on orders for these samples will be processed based on acceptable specimen integrity and analyte stability, which may vary by analyte. Magnesium 09/08/2022 2.0     Phosphorus 09/08/2022 3.2     WBC 09/09/2022 10.5     RBC 09/09/2022 3.19 (A)    HGB 09/09/2022 10.5 (A)    HCT 09/09/2022 30.3 (A)    MCV 09/09/2022 95.0     MCH 09/09/2022 32.9     MCHC 09/09/2022 34.7     RDW 09/09/2022 16.4 (A)    PLATELET 96/91/9341 812     MPV 09/09/2022 10.3     NRBC 09/09/2022 0.0     ABSOLUTE NRBC 09/09/2022 0.00     Sodium 09/09/2022 139     Potassium 09/09/2022 3.4 (A)    Chloride 09/09/2022 103     CO2 09/09/2022 24     Anion gap 09/09/2022 12     Glucose 09/09/2022 84     BUN 09/09/2022 25 (A)    Creatinine 09/09/2022 0.93     BUN/Creatinine ratio 09/09/2022 27 (A)    GFR est AA 09/09/2022 >60     GFR est non-AA 09/09/2022 >60     Calcium 09/09/2022 9.1     Magnesium 09/09/2022 1.9     Phosphorus 09/09/2022 2.6        IMAGING:  XR ABD FLAT/ ERECT    Result Date: 9/8/2022  1. NG tube tip is in the proximal aspect of the stomach. It can be advanced 8-10 cm for improved positioning. 2. Stable pattern of ileus versus mild grade partial proximal small bowel obstruction.      XR ABD FLAT/ ERECT    Result Date: 9/7/2022  Enteric tube remains in position with no significant change in small bowel obstruction pattern     CT ABD PELV W CONT    Result Date: 9/6/2022  New mid small bowel mechanical obstruction. Nonspecific colitis of the right colon. No other significant change. Please refer to above findings for complete details     XR ABD PORT  1 V    Result Date: 9/6/2022  1. Gastric tube terminates in the proximal stomach with sidehole in the region of the GE junction. Recommend advancement. EKG:  No results found for this or any previous visit.           Itz April, DO 9/9/2022 6:18 PM

## 2022-09-09 NOTE — PROGRESS NOTES
[facility] [place] at [address], [address], [address]  W: [phone]  F: [phone]    Reason for evaluation   Enio Joshi is a 67 y.o. male who is seen for new patient evaluation during admission for SBO, known h/o Pancreatic adenocarcinoma - stage IV     History of Present Illness:   Patient is a 67 y.o. male known to me for stage IV pancreatic adenocarcinoma, ITP who started second line FOLFOX on 8/16/2022 is admitted on 9/7/2022 with SBO    Today NG in place, pain better, no BM      Treatment History:   6/7/2022- Gemzar and abraxane   8/2022: CT with progression  8/16/2022: FOLFOX      Past Medical History:   Diagnosis Date    Claudication ([address]     Constipation 10/22/2012    Glaucoma     Hypercholesterolemia     Hypertension     Pancreatitis 10/2012    Prostate cancer ([facility]     s/p prostatectomy    Thrombocytopenia ([facility] 7/27/2010      Past Surgical History:   Procedure Laterality Date    ENDOSCOPY, COLON, DIAGNOSTIC  1999    Normal; Dr. Binh Griffin  04/2017    HX ORTHOPAEDIC      right wrist surgery    HX OTHER SURGICAL      torn cartridge in L knee    HX PROSTATECTOMY  2005    IR INSERT TUNL CVC W PORT OVER 5 YEARS  6/3/2022      Social History     Tobacco Use    Smoking status: Never     Passive exposure: Yes    Smokeless tobacco: Never   Substance Use Topics    Alcohol use: No     Comment: ocassionally      Family History   Problem Relation Age of Onset    Cancer Mother     Hypertension Mother     Hypertension Brother     Heart Disease Father     Hypertension Brother      Current Facility-Administered Medications   Medication Dose Route Frequency    LORazepam (INTENSOL) 2 mg/mL oral concentrate 0.5 mg  0.5 mg Oral QHS PRN    TPN ADULT - CENTRAL   IntraVENous CONTINUOUS    fat emulsion 20% soy-oliv-fish oil (SMOFlipid) infusion 250 mL  250 mL IntraVENous Q24H    bacitracin 500 unit/gram packet 1 Packet  1 Packet Topical PRN gabapentin (NEURONTIN) capsule 100 mg  100 mg Oral TID    phenol throat spray (CHLORASEPTIC) 1 Spray  1 Spray Oral PRN    benzocaine-menthoL (CHLORASEPTIC MAX) lozenge 1 Lozenge  1 Lozenge Oral Q2H PRN    HYDROmorphone (DILAUDID) injection 0.5 mg  0.5 mg IntraVENous Q3H PRN    lactated Ringers infusion  125 mL/hr IntraVENous CONTINUOUS    sodium chloride (NS) flush 5-40 mL  5-40 mL IntraVENous Q8H    sodium chloride (NS) flush 5-40 mL  5-40 mL IntraVENous PRN    acetaminophen (TYLENOL) tablet 650 mg  650 mg Oral Q6H PRN    Or    acetaminophen (TYLENOL) suppository 650 mg  650 mg Rectal Q6H PRN    polyethylene glycol (MIRALAX) packet 17 g  17 g Oral DAILY PRN    ondansetron (ZOFRAN ODT) tablet 4 mg  4 mg Oral Q8H PRN    Or    ondansetron (ZOFRAN) injection 4 mg  4 mg IntraVENous Q6H PRN    piperacillin-tazobactam (ZOSYN) 3.375 g in 0.9% sodium chloride (MBP/ADV) 100 mL MBP  3.375 g IntraVENous Q8H      Allergies   Allergen Reactions    Shellfish Derived Swelling    Zoloft [Sertraline] Other (comments)     Insomnia        Review of Systems: A complete review of systems was obtained, negative except as described above. Physical Exam:     Visit Vitals  /80 (BP 1 Location: Left upper arm)   Pulse (!) 101 Comment: RN notified   Temp 98.1 °F (36.7 °C)   Resp 20   Ht 5' 10\" (1.778 m)   Wt 145 lb (65.8 kg)   SpO2 99%   BMI 20.81 kg/m²       ECOG PS: 2  General: NG in place , bilious drainage  Eyes: PERRL, anicteric sclerae  HENT: Dry  Neck: Supple  Respiratory: normal respiratory effort  CV: Normal rate, no peripheral edema  GI: distended, Supraumbilical palpable ill defined non tender area , firm, perhaps 2 cm   Psych: Alert, oriented, appropriate affect, normal judgment/insight    Results:     Lab Results   Component Value Date/Time    WBC 10.5 09/09/2022 05:45 AM    HGB 10.5 (L) 09/09/2022 05:45 AM    HCT 30.3 (L) 09/09/2022 05:45 AM    PLATELET 995 15/79/0192 05:45 AM    MCV 95.0 09/09/2022 05:45 AM    ABS. NEUTROPHILS 15.5 (H) 09/07/2022 04:00 AM     Lab Results   Component Value Date/Time    Sodium 139 09/09/2022 05:45 AM    Potassium 3.4 (L) 09/09/2022 05:45 AM    Chloride 103 09/09/2022 05:45 AM    CO2 24 09/09/2022 05:45 AM    Glucose 84 09/09/2022 05:45 AM    BUN 25 (H) 09/09/2022 05:45 AM    Creatinine 0.93 09/09/2022 05:45 AM    GFR est AA >60 09/09/2022 05:45 AM    GFR est non-AA >60 09/09/2022 05:45 AM    Calcium 9.1 09/09/2022 05:45 AM    Glucose (POC) 93 09/09/2022 12:10 PM     Lab Results   Component Value Date/Time    Bilirubin, total 1.1 (H) 09/07/2022 04:00 AM    ALT (SGPT) 37 09/07/2022 04:00 AM    Alk. phosphatase 65 09/07/2022 04:00 AM    Protein, total 6.5 09/07/2022 04:00 AM    Albumin 3.2 (L) 09/07/2022 04:00 AM    Globulin 3.3 09/07/2022 04:00 AM       Lab Results   Component Value Date/Time    Lipase 98 09/06/2022 05:15 PM       Lab Results   Component Value Date/Time    INR 1.0 04/05/2010 08:43 AM    aPTT 29.3 04/05/2010 08:43 AM    D-dimer 2.51 (H) 08/07/2022 02:34 PM     Prostate Specific Ag   Date Value   05/16/2022 <0.1 ng/mL   04/04/2017 <0.1 ng/mL   02/23/2012 0.7 ng/mL   10/13/2009 0.4 NG/ML     CA 19-9:  Recent Labs     08/16/22  0912 05/16/22  0906   C199LT 6 <2       Records reviewed and summarized above. Pathology report(s) reviewed above. Radiology report(s) reviewed above. 1. There are 2 pancreatic mass lesions suspicious for neoplasm with features  suggestive of adenocarcinoma with lesion in the pancreatic body abutting the  celiac axis and potentially occluding left gastric artery and likely responsible  for 5 mm dilation of pancreatic duct into the pancreatic tail. 2. 1.4 x 1.7 cm left periaortic lymph node and 1.1 x 1.6 cm right common iliac  node concerning for metastatic neoplasm, possibly prostatic in this patient with  history of prostate cancer and prostatectomy. 3.  No MRI evidence of other metastatic disease    PET CT 5/19/2022    IMPRESSION  1. RML lesion, indeterminate. 2. LLL punctate nodule, indeterminate. 3. Two pancreatic masses. 4. Two abdominal midline wall tumor deposits. 5. Metastatic retroperitoneal nodes. 6. Peritoneal tumor implants. 7/28/2022 CT WO CONTRAST    IMPRESSION     1.  2 separate pancreatic masses as noted previously which are not significantly  changed in size. 2.  Multiple pulmonary nodules as described. The larger lesions are unchanged. Smaller lesions may been present on the head CT but too difficult to visualize  given the resolution of the CT images. Attention on follow-up imaging. 3.  Increased size of prevascular lymph node anterior to the aortic bifurcation  consistent with progression of disease. 4.  Questionable new hypodensity in the liver. If clinically indicated, CT or  MRI with contrast should be obtained. 5.  Sclerotic lesion in the right iliac bone unchanged. This was not  metabolically active on the prior PET/CT and is of uncertain clinical  significance. CT with contrast repeated 8/11/2022     IMPRESSION  Metastatic pancreatic malignancy with stable small nodules at the lung bases,  multiple liver metastases, multiple peritoneal and abdominal wall metastases,  left para-aortic retroperitoneal metastasis    CT 9/7/2022    IMPRESSION     New mid small bowel mechanical obstruction. Nonspecific colitis of the right  colon. No other significant change. Please refer to above findings for complete  details     Assessment:   1) Pancreatic masses-Adenocarcinoma- stage IV   Ambry genetics- negative     Presented to with epigastric pain. Noted to have 2 adjustment masses in the pancreatic body and tail measuring about 3.6 to 3.9 cm. Mass is about the celiac plexus and splenic artery. Scan showed a 1.4 x 1.7 cm left periaortic lymph node as well as a 1.1 x 1.6 cm right common iliac node.   PET CT reviewed and noted tumor deposits, metastatic RP nodes and peritoneal carcinomatosis    Consistent with pancreatic cancer, stage IV  Progressed on [place] abraxane  Started palliative FOLFOX 8/2022. Stable scans 9/7 but has SBO  Likely from known carcinomatosis  Discussed that if this resolved on supportive management we hope to resume FOLFOX  However if that is not the case he is not inclined toward palliative surgery and we did not recommend that either. Out goal would be to focus on comfort in that situation. 2) history of prostate cancer  Jerri 3+3 status post prostatectomy 2005  Recurrent disease Jerri 3+4 in 2016 status postradiation with undetectable PSA    3) SBO  Appreciate Surgery  Supportive management     4) H/O ITP    PLAN    Continue supportive management  Appreciate palliative care  Will follow     I appreciate the opportunity to participate in Mr. Merced Correa Sr.'s care.     Signed By: Olya Perez MD

## 2022-09-09 NOTE — PROGRESS NOTES
Problem: Falls - Risk of  Goal: *Absence of Falls  Description: Document Jovanny Cease Fall Risk and appropriate interventions in the flowsheet.   Outcome: Progressing Towards Goal  Note: Fall Risk Interventions:  Mobility Interventions: Communicate number of staff needed for ambulation/transfer, Patient to call before getting OOB         Medication Interventions: Evaluate medications/consider consulting pharmacy, Patient to call before getting OOB, Teach patient to arise slowly    Elimination Interventions: Call light in reach, Patient to call for help with toileting needs

## 2022-09-09 NOTE — PROGRESS NOTES
[employer] [facility] at [facility]      Subjective     N.p.o. with NG tube, passing gas having no abdominal pain, PICC line placed to start TPN soon    Objective     Patient Vitals for the past 24 hrs:   Temp Pulse Resp BP SpO2   09/09/22 1214 98.1 °F (36.7 °C) (!) 101 20 130/80 99 %   09/09/22 1200 -- (!) 101 -- -- 99 %   09/09/22 1000 -- (!) 107 -- -- --   09/09/22 0812 98.8 °F (37.1 °C) (!) 127 21 122/78 99 %   09/09/22 0600 -- (!) 111 -- -- --   09/09/22 0455 98.7 °F (37.1 °C) (!) 109 20 122/73 99 %   09/09/22 0400 -- (!) 110 -- -- --   09/09/22 0200 -- (!) 108 -- -- --   09/08/22 2355 99.1 °F (37.3 °C) (!) 107 20 117/74 99 %   09/08/22 2200 -- 100 -- -- --   09/08/22 2039 98.5 °F (36.9 °C) (!) 105 20 127/76 100 %   09/08/22 2000 -- 94 -- -- --   09/08/22 1800 -- (!) 105 -- -- --       Date 09/08/22 0700 - 09/09/22 0659 09/09/22 0700 - 09/10/22 0659   Shift 7506-0726 8220-0494 24 Hour Total 2589-6059 0245-0708 24 Hour Total   INTAKE   Shift Total(mL/kg)         OUTPUT   Urine(mL/kg/hr)           Urine Occurrence(s)  1 x 1 x      Emesis/NG output  950  950     Output (ml) (Nasogastric Tube 09/06/22)  950  950   Shift Total(mL/kg) 900(13.7) 450(6.8) 1350(20.5) 950(14.4)  950(14.4)   NET -900 -450 -1350 -950  -950   Weight (kg) 65.8 65.8 65.8 65.8 65.8 65.8       PE  Pulm - CTAB  CV - RRR  Abd -soft, nondistended, bowel sounds present, nontender to palpation    Labs  Recent Results (from the past 12 hour(s))   CBC W/O DIFF    Collection Time: 09/09/22  5:45 AM   Result Value Ref Range    WBC 10.5 4.1 - 11.1 K/uL    RBC 3.19 (L) 4.10 - 5.70 M/uL    HGB 10.5 (L) 12.1 - 17.0 g/dL    HCT 30.3 (L) 36.6 - 50.3 %    MCV 95.0 80.0 - 99.0 FL    MCH 32.9 26.0 - 34.0 PG    MCHC 34.7 30.0 - 36.5 g/dL    RDW 16.4 (H) 11.5 - 14.5 %    PLATELET 514 134 - 927 K/uL    MPV 10.3 8.9 - 12.9 FL    NRBC 0.0 0  WBC    ABSOLUTE NRBC 0.00 0.00 - 7.96 K/uL   METABOLIC PANEL, BASIC    Collection Time: 09/09/22  5:45 AM   Result Value Ref Range    Sodium 139 136 - 145 mmol/L    Potassium 3.4 (L) 3.5 - 5.1 mmol/L    Chloride 103 97 - 108 mmol/L    CO2 24 21 - 32 mmol/L    Anion gap 12 5 - 15 mmol/L    Glucose 84 65 - 100 mg/dL    BUN 25 (H) 6 - 20 MG/DL    Creatinine 0.93 0.70 - 1.30 MG/DL    BUN/Creatinine ratio 27 (H) 12 - 20      GFR est AA >60 >60 ml/min/1.73m2    GFR est non-AA >60 >60 ml/min/1.73m2    Calcium 9.1 8.5 - 10.1 MG/DL   MAGNESIUM    Collection Time: 09/09/22  5:45 AM   Result Value Ref Range    Magnesium 1.9 1.6 - 2.4 mg/dL   PHOSPHORUS    Collection Time: 09/09/22  5:45 AM   Result Value Ref Range    Phosphorus 2.6 2.6 - 4.7 MG/DL   GLUCOSE, POC    Collection Time: 09/09/22 12:10 PM   Result Value Ref Range    Glucose (POC) 93 65 - 117 mg/dL    Performed by Dario Lozada. is a 67 y. o.yr old male with small bowel obstruction, stage IV pancreatic cancer with likely carcinomatosis     Plan     Right now continuing with the plan of NG tube and NPO  TPN to be started by hospitalist team  No surgical plans at this point and surgery not recommended with the progression of his disease  If needed a decompressive PEG could be placed  No new changes right now    Jake Anguiano MD

## 2022-09-09 NOTE — PROGRESS NOTES
Bedside shift change report given to Edison Ramirez (oncoming nurse) by Nabil Madison RN (offgoing nurse). Report included the following information SBAR and Kardex.

## 2022-09-09 NOTE — CONSULTS
Comprehensive Nutrition Assessment    Type and Reason for Visit: Initial, Consult    Nutrition Recommendations/Plan:   TPN Recommendations: Continuous D15 AA5% @ 75 ml/hr + 250 ml 20% lipids daily  -Day 1: D15 AA5% @ 40 ml/hr  -Day 2: Goal rate if lytes WNL    Please obtain updated weight       Malnutrition Assessment:  Malnutrition Status: At risk for malnutrition (specify) (slow wt loss, npo/tpn) (09/09/22 1340)           Nutrition Assessment:    PMHx: pancreatic cancer stage 4 on chemo, HTN, HLD    Consult for TPN appreciated. 67 y.o. male admitted with SBO, n/v, acute colitis. His last chemo session was last Tuesday per ED note. SBO is suspected to be mechanical obstruction r/t panc cancer. Pt is currently NPO with suction NGT placed. Pt typically has a BM daily but didn't have one 3 days pta. He denied abd pain today. Per wt history pt has lost 6# over the last 5 weeks which is not considered significant, though it appears he may be slowly/steadily losing wt. K 3.4 today. TPN Recommendations: Continuous D15 AA5% @ 75 ml/hr + 250 ml 20% lipids daily. This will provide 1778 kcals (96% of needs), 90 g pro (100% of needs).        Recent Labs     09/09/22  0545 09/08/22  0329 09/07/22  0400 09/06/22  1715   GLU 84 104* 157* 202*   BUN 25* 30* 37* 35*   CREA 0.93 1.03 1.68* 1.48*    135* 130* 127*   K 3.4* 3.6 4.3 4.3    102 97 95*   CO2 24 23 24 22   CA 9.1 8.9 9.4 9.6   PHOS 2.6 3.2  --   --    MG 1.9 2.0  --  1.9         Nutritionally Significant Medications:  Zosyn, mag sulf, K Cl, ringers      Estimated Daily Nutrient Needs:  Energy Requirements Based On: Kcal/kg  Weight Used for Energy Requirements: Current  Energy (kcal/day): 0355-2706 (28-30 kcal/kg)  Weight Used for Protein Requirements: Current  Protein (g/day): 73-86 (1.1-1.3 g/kg)  Method Used for Fluid Requirements: 1 ml/kcal  Fluid (ml/day): 1900    Nutrition Related Findings:   Edema: No data recorded    Last BM: 09/04/22 (per patient), Wounds: None      Current Nutrition Therapies:  Diet: NPO  Supplements: NPO  Meal intake: No data found. Supplement intake: No data found. Nutrition Support: none, consult for TPN      Anthropometric Measures:  Height: 5' 10\" (177.8 cm)  Ideal Body Weight (IBW): 166 lbs (75 kg)     Current Body Wt:  65.8 kg (145 lb 1 oz), 87.4 % IBW. Not specified  Current BMI (kg/m2): 20.8  Usual Body Weight: 68.5 kg (151 lb)  % Weight Change (Calculated): -3.9  Weight Adjustment: No adjustment    Wt Readings from Last 10 Encounters:   09/06/22 65.8 kg (145 lb)   08/30/22 63.5 kg (140 lb)   08/16/22 65.8 kg (145 lb)   08/16/22 65.8 kg (145 lb)   08/02/22 66.9 kg (147 lb 6.4 oz)   08/02/22 66.7 kg (147 lb)   07/19/22 68.8 kg (151 lb 9.6 oz)   07/19/22 68.5 kg (151 lb)   07/05/22 68.9 kg (152 lb)   06/21/22 69.9 kg (154 lb)           Nutrition Diagnosis:   Inadequate oral intake related to altered GI function (SBO) as evidenced by NPO or clear liquid status due to medical condition, nutrition support-parenteral nutrition, nausea, vomiting    Nutrition Interventions:   Food and/or Nutrient Delivery: Start parenteral nutrition  Nutrition Education/Counseling: No recommendations at this time  Coordination of Nutrition Care: Continue to monitor while inpatient       Goals:     Goals: Tolerate nutrition support at goal rate, by next RD assessment       Nutrition Monitoring and Evaluation:   Behavioral-Environmental Outcomes: None identified  Food/Nutrient Intake Outcomes: Parenteral nutrition intake/tolerance  Physical Signs/Symptoms Outcomes: Biochemical data, Nausea/vomiting, GI status, Weight    Discharge Planning:     Too soon to determine    Esa Bailey  Available via [facility]

## 2022-09-10 NOTE — PROGRESS NOTES
[facility]ist Service Progress Note    INTERVAL HISTORY  / Subjective: Follow up SBO. Patient seen and examined. No pain, reports bowel movement, NG with -1900 out in 24 hours. Continues on TPN. Reports episode of vomiting yesterday when suction paused for gabapentin. Assessment / Plan:  Admission History:  67 y.o. male with history of stage IV pancreatic cancer, history of prostate cancer, hypertension, dyslipidemia who presents to hospital with complaints of  Nausea and vomiting x1 week. .  Reports near daily bowel movements but has not had one in last 3 days. Aching, generalized, nonradiating abdominal pain which is since resolved since placement of NG tube. Associated nausea without emesis. The patient denies any fever, chills, chest pain,vomiting, cough, congestion, recent illness, palpitations, or dysuria. Remarkable vitals on ER Presentations: Heart rate to 122  Labs Remarkable for: WBC 14, sodium 127, glucose 202, creatinine 1.4  ER Images: New mid small bowel mechanical obstruction. Nonspecific colitis of the right  colon. Plan  Small bowel obstruction- persistent  -Suspected mechanical obstruction in setting of pancreatic cancer  -Conservative management   -General surgery following  -Keep NG tube to suction  -continue TPN and lipids, at goal   -Keep N.p.o.  -Repeat KUB  -May need decompressive PEG pending clinical course    History of stage IV pancreatic cancer  -Oncology consulted. Recommend conservative management  -Appreciate palliative care.  Will continue to follow along    Hiccups:   -continue gabapentin      Acute colitis  -continue empiric Zosyn  -Send stool studies if obtainable     Hypertension  -Hold BP lowering agents given labile Bps    RENETTA, Hyponatremia, and Dehydration  --s/p IVFs     NIDDM II  -SSI +Hypoglycemic protocols     Obesity  -Counseled on weight loss, dieting and exercise      Objective:  Visit Vitals  /70 (BP 1 Location: Left arm, BP Patient Position: At rest)   Pulse (!) 101   Temp 99.5 °F (37.5 °C)   Resp 18   Ht 5' 10\" (1.778 m)   Wt 66.5 kg (146 lb 9.7 oz)   SpO2 98%   BMI 21.04 kg/m²                 Physical Exam:  General: Thin elderly appearing man   Neck: Supple, no lymphadenopathy, no JVD   Lungs: Clear to auscultation bilaterally , no increase work of breathing  Cardiovascular: Regular rate and rhythm with normal S1 and S2   Abdomen: Soft, nontender, nondistended, hyperactive bowels sounds   Extremities: No cyanosis clubbing or edema   Neuro: Nonfocal, A&O x3   Psych: Normal affect     Intake and Output:  Date 09/09/22 0700 - 09/10/22 0659 09/10/22 0700 - 09/11/22 0659   Shift 4607-6011 5505-3934 24 Hour Total 1363-1151 5562-8057 24 Hour Total   INTAKE   P.O.    0  0     P. O.    0  0   Shift Total(mL/kg)    0(0)  0(0)   OUTPUT   Emesis/NG output 950 1000 1950 450  450     Output (ml) (Nasogastric Tube 09/06/22) 950 1000 1950 450  450   Stool    1  1     Stool    1  1   Shift Total(mL/kg) 950(14.4) 1000(15) 1950(29.3) 451(6.8)  451(6.8)   NET -950 -1000 -1950 -451  -451   Weight (kg) 65.8 66.5 66.5 66.5 66.5 66.5         LAB:  No results displayed because visit has over 200 results. IMAGING:  XR ABD FLAT/ ERECT    Result Date: 9/8/2022  1. NG tube tip is in the proximal aspect of the stomach. It can be advanced 8-10 cm for improved positioning. 2. Stable pattern of ileus versus mild grade partial proximal small bowel obstruction. XR ABD FLAT/ ERECT    Result Date: 9/7/2022  Enteric tube remains in position with no significant change in small bowel obstruction pattern     CT ABD PELV W CONT    Result Date: 9/6/2022  New mid small bowel mechanical obstruction. Nonspecific colitis of the right colon. No other significant change. Please refer to above findings for complete details     XR ABD PORT  1 V    Result Date: 9/6/2022  1. Gastric tube terminates in the proximal stomach with sidehole in the region of the GE junction.  Recommend advancement. EKG:  No results found for this or any previous visit.           Ilana Calvo, DO  9/10/2022 6:18 PM

## 2022-09-10 NOTE — PROGRESS NOTES
Spiritual Care Assessment/Progress Note  [address]      NAME: Jen Ruiz Sr. MRN: [id number]  AGE: 67 y.o.  SEX: male  [place] Affiliation: Zoroastrianism   Language: English     9/10/2022     Total Time (in minutes): 20     Spiritual Assessment begun in [facility] through conversation with:         [x]Patient        [] Family    [] Friend(s)        Reason for Consult: Palliative Care, Initial/Spiritual Assessment     Spiritual beliefs: (Please include comment if needed)     [x] Identifies with a faizan tradition:         [x] Supported by a faizan community:            [] Claims no spiritual orientation:           [] Seeking spiritual identity:                [] Adheres to an individual form of spirituality:           [] Not able to assess:                           Identified resources for coping:      [x] Prayer                               [] Music                  [] Guided Imagery     [x] Family/friends                 [] Pet visits     [] Devotional reading                         [] Unknown     [] Other:                                               Interventions offered during this visit: (See comments for more details)    Patient Interventions: Affirmation of emotions/emotional suffering, Catharsis/review of pertinent events in supportive environment, Iconic (affirming the presence of God/Higher Power), Initial/Spiritual assessment, patient floor, Prayer (actual), Prayer (assurance of)           Plan of Care:     [x] Support spiritual and/or cultural needs    [] Support AMD and/or advance care planning process      [] Support grieving process   [] Coordinate Rites and/or Rituals    [] Coordination with community clergy   [] No spiritual needs identified at this time   [] Detailed Plan of Care below (See Comments)  [] Make referral to Music Therapy  [] Make referral to Pet Therapy     [] Make referral to Addiction services  [] Make referral to [facility]  [] Make referral to Spiritual Care Partner  [] No future visits requested        [x] Contact Spiritual Care for further referrals     Comments: Visited Mr Ag Bond in room 624 for initial Palliative Care spiritual assessment; reviewed patient's chart prior to visit. Mr Ag Bond was resting quietly in bed and appeared in pretty good spirits. Provided spiritual presence and active listening as Mr Ag Bond shared that he felt he was doing some better; said he knew it was a process and would take some time. He shared that his wife, Rina Jasso, also had cancer. Stated that they had very supportive children who assisted them. Acknowledged his feelings and concerns and offered words of support. Mr Ag Bond stated that he was a member of [facility]; that he was very active in his [employer]. Shared that his  and faizan community were supportive and kept him and Rina Jasso in their prayers. With his permission, had prayer on behalf of patient and his family. Assured him of ongoing  availability for support. : Rev. Sandie Fox.  Rashid Spann; [facility], to contact [phone] [address] call: [phone]

## 2022-09-10 NOTE — PROGRESS NOTES
Progress Note    Patient: Lanette Alfaro Sr. MRN: [id number]  SSN: [id number]    YOB: 1945  Age: 67 y.o. Sex: male      Admit Date: 2022    * No surgery found *    Procedure:      Subjective:     Patient states he had a good bowel movement. A little bit of gas with it. Denies any nausea. Objective:     Visit Vitals  /70 (BP 1 Location: Left arm, BP Patient Position: At rest)   Pulse (!) 101   Temp 99.5 °F (37.5 °C)   Resp 18   Ht 5' 10\" (1.778 m)   Wt 66.5 kg (146 lb 9.7 oz)   SpO2 98%   BMI 21.04 kg/m²       Temp (24hrs), Av.2 °F (37.3 °C), Min:98.8 °F (37.1 °C), Max:99.5 °F (37.5 °C)      Physical Exam:    General alert no acute distress  Lungs clear  Heart regular rate and rhythm  Abdomen soft minimal tenderness positive bowel sounds    Data Review:   Axr- IMPRESSION  Interval improvement in partial small bowel obstruction, with  reduction in caliber of small bowel loops. NG tube in the upper stomach. Lab Review: All lab results for the last 24 hours reviewed.   Recent Results (from the past 24 hour(s))   CBC W/O DIFF    Collection Time: 09/10/22  2:24 AM   Result Value Ref Range    WBC 8.7 4.1 - 11.1 K/uL    RBC 2.79 (L) 4.10 - 5.70 M/uL    HGB 9.1 (L) 12.1 - 17.0 g/dL    HCT 26.1 (L) 36.6 - 50.3 %    MCV 93.5 80.0 - 99.0 FL    MCH 32.6 26.0 - 34.0 PG    MCHC 34.9 30.0 - 36.5 g/dL    RDW 16.5 (H) 11.5 - 14.5 %    PLATELET 769 (L) 572 - 400 K/uL    MPV 9.8 8.9 - 12.9 FL    NRBC 0.0 0  WBC    ABSOLUTE NRBC 0.00 0.00 - 9.28 K/uL   METABOLIC PANEL, BASIC    Collection Time: 09/10/22  2:24 AM   Result Value Ref Range    Sodium 138 136 - 145 mmol/L    Potassium 3.2 (L) 3.5 - 5.1 mmol/L    Chloride 102 97 - 108 mmol/L    CO2 30 21 - 32 mmol/L    Anion gap 6 5 - 15 mmol/L    Glucose 145 (H) 65 - 100 mg/dL    BUN 24 (H) 6 - 20 MG/DL    Creatinine 1.02 0.70 - 1.30 MG/DL    BUN/Creatinine ratio 24 (H) 12 - 20      GFR est AA >60 >60 ml/min/1.73m2    GFR est non-AA >60 >60 ml/min/1.73m2    Calcium 8.4 (L) 8.5 - 10.1 MG/DL   MAGNESIUM    Collection Time: 09/10/22  2:24 AM   Result Value Ref Range    Magnesium 2.2 1.6 - 2.4 mg/dL   PHOSPHORUS    Collection Time: 09/10/22  2:24 AM   Result Value Ref Range    Phosphorus 2.4 (L) 2.6 - 4.7 MG/DL       Assessment:     Hospital Problems  Date Reviewed: 8/30/2022            Codes Class Noted POA    [facility] ICD-10-CM: C72.516  ICD-9-CM: 560.9  9/6/2022 Unknown           Plan/Recommendations/Medical Decision Making:   Small bowel obstruction  Seems to be improving. He still has bilious output from the NG tube and therefore would leave it in place at this time.   Will continue to monitor daily abdominal x-rays  Continue TPN  Hopefully NG tube out soon

## 2022-09-10 NOTE — PROGRESS NOTES
Problem: Falls - Risk of  Goal: *Absence of Falls  Description: Document [place] Fall Risk and appropriate interventions in the flowsheet.   Outcome: Progressing Towards Goal  Note: Fall Risk Interventions:  Mobility Interventions: Patient to call before getting OOB         Medication Interventions: Evaluate medications/consider consulting pharmacy    Elimination Interventions: Bed/chair exit alarm

## 2022-09-11 NOTE — PROGRESS NOTES
Progress Note    Patient: Danyelle Molina Sr. MRN: [id number]  SSN: [id number]    YOB: 1945  Age: 67 y.o. Sex: male      Admit Date: 2022    * No surgery found *    Procedure:      Subjective:     Patient denies any nausea. States he has had another bowel movement and a small amount of flatus    Objective:     Visit Vitals  /74 (BP 1 Location: Left arm, BP Patient Position: At rest)   Pulse 76   Temp 98.4 °F (36.9 °C)   Resp 18   Ht 5' 10\" (1.778 m)   Wt 63.5 kg (140 lb)   SpO2 98%   BMI 20.09 kg/m²       Temp (24hrs), Av °F (36.7 °C), Min:97.3 °F (36.3 °C), Max:98.4 °F (36.9 °C)      Physical Exam:    General alert no acute distress  Lungs clear  Heart regular rate and rhythm  Abdomen soft nontender nondistended  NG tube no longer bilious    Data Review: images and reports reviewed  AXR- FINDINGS: The lungs bases are clear. No free air under the diaphragm. Enteric  tube terminates in the proximal stomach. Dilated small bowel loops to 4 cm are unchanged. Stool and gas are in the  nondistended colon. No evidence of free intraperitoneal air. No calcification projected over the kidneys. Bones are unchanged. IMPRESSION  Unchanged small bowel distention in the setting of small bowel obstruction. Enteric tube is in the proximal stomach. Lab Review: All lab results for the last 24 hours reviewed.   Recent Results (from the past 24 hour(s))   CBC W/O DIFF    Collection Time: 22 12:37 AM   Result Value Ref Range    WBC 7.8 4.1 - 11.1 K/uL    RBC 2.56 (L) 4.10 - 5.70 M/uL    HGB 8.3 (L) 12.1 - 17.0 g/dL    HCT 24.4 (L) 36.6 - 50.3 %    MCV 95.3 80.0 - 99.0 FL    MCH 32.4 26.0 - 34.0 PG    MCHC 34.0 30.0 - 36.5 g/dL    RDW 16.7 (H) 11.5 - 14.5 %    PLATELET 212 (L) 564 - 400 K/uL    MPV 10.3 8.9 - 12.9 FL    NRBC 0.0 0  WBC    ABSOLUTE NRBC 0.00 0.00 - 2.87 K/uL   METABOLIC PANEL, BASIC    Collection Time: 22 12:37 AM   Result Value Ref Range    Sodium 140 136 - 145 mmol/L    Potassium 3.4 (L) 3.5 - 5.1 mmol/L    Chloride 107 97 - 108 mmol/L    CO2 28 21 - 32 mmol/L    Anion gap 5 5 - 15 mmol/L    Glucose 157 (H) 65 - 100 mg/dL    BUN 17 6 - 20 MG/DL    Creatinine 0.79 0.70 - 1.30 MG/DL    BUN/Creatinine ratio 22 (H) 12 - 20      GFR est AA >60 >60 ml/min/1.73m2    GFR est non-AA >60 >60 ml/min/1.73m2    Calcium 8.0 (L) 8.5 - 10.1 MG/DL   MAGNESIUM    Collection Time: 09/11/22 12:37 AM   Result Value Ref Range    Magnesium 2.0 1.6 - 2.4 mg/dL   PHOSPHORUS    Collection Time: 09/11/22 12:37 AM   Result Value Ref Range    Phosphorus 2.6 2.6 - 4.7 MG/DL       Assessment:     Hospital Problems  Date Reviewed: 8/30/2022            Codes Class Noted POA    SBO (small bowel obstruction) ([facility] ICD-10-CM: W05.530  ICD-9-CM: 560.9  9/6/2022 Unknown           Plan/Recommendations/Medical Decision Making:   SBO-seems to be resolving  Still has dilated loops seen on x-ray but clinically is improving. We will do Gastrografin challenge to make sure that contrast moves all the way through.   If it does may be able to remove NG tube and start clear liquids

## 2022-09-11 NOTE — PROGRESS NOTES
CHAVO:  1. RUR-20%  2. Return home with home health when stable. 3. [place]  referral.    CM noted patient would like referral sent to [facility]. Referral sent, pending response.      Lizet Santiago [facility]

## 2022-09-11 NOTE — PROGRESS NOTES
Problem: Falls - Risk of  Goal: *Absence of Falls  Description: Document [place] Flow Fall Risk and appropriate interventions in the flowsheet. Outcome: Progressing Towards Goal  Note: Fall Risk Interventions:  Mobility Interventions: Patient to call before getting OOB         Medication Interventions: Evaluate medications/consider consulting pharmacy, Teach patient to arise slowly, Patient to call before getting OOB    Elimination Interventions: Call light in reach, Urinal in reach              Problem: Pressure Injury - Risk of  Goal: *Prevention of pressure injury  Description: Document [place] Scale and appropriate interventions in the flowsheet.   Outcome: Progressing Towards Goal  Note: Pressure Injury Interventions:       Moisture Interventions: Absorbent underpads    Activity Interventions: Increase time out of bed    Mobility Interventions: Float heels    Nutrition Interventions: Offer support with meals,snacks and hydration

## 2022-09-11 NOTE — PROGRESS NOTES
[id number] [facility]  Hospitalist Group                                                                                          Hospitalist Progress Note  Andres Fine MD  Answering service: [phone] OR [phone] from in house phone        Date of Service:  2022  NAME:  Lore Valente  :  1945  MRN:  [id number]      Admission Summary:   67 y.o. male with history of stage IV pancreatic cancer, history of prostate cancer, hypertension, dyslipidemia who presents to hospital with complaints of  Nausea and vomiting x1 week. .  Reports near daily bowel movements but has not had one in last 3 days. Aching, generalized, nonradiating abdominal pain which is since resolved since placement of NG tube. Associated nausea without emesis. Interval history / Subjective:   Had 2 BM yesterday with liquid stool. No further bowel movements since, and no flatulence. Assessment & Plan:     Small bowel obstruction- persistent, and worse today   -Suspected mechanical obstruction in setting of pancreatic cancer  -Conservative management   -General surgery following, suspect will not get better without surgery   -Keep NG tube to suction today   -continue TPN and lipids, at goal   -Keep NPO  -Repeat KUB today with unchanged obstruction   -May need decompressive PEG pending clinical course vs. OR vs. Comfort  -Palliative following     History of stage IV pancreatic cancer  -Oncology consulted. Recommend conservative management  -Appreciate palliative care.  Will continue to follow along     Hiccups:   -continue gabapentin      Acute colitis  -continue empiric Zosyn  -Send stool studies if obtainable     Hypertension  -Hold BP lowering agents given labile Bps     RENETTA, Hyponatremia, and Dehydration  --s/p IVFs     NIDDM II  -SSI +Hypoglycemic protocols     Obesity  -Counseled on weight loss, dieting and exercise      Code status: FULL  Prophylaxis: SCDs  Care Plan discussed with: pt, RN  Anticipated Disposition: HHPT when ready, needs return of bowel function     Hospital Problems  Date Reviewed: 8/30/2022            Codes Class Noted POA    SBO (small bowel obstruction) ([facility] ICD-10-CM: V08.233  ICD-9-CM: 560.9  9/6/2022 Unknown             Review of Systems:   A comprehensive review of systems was negative except for that written in the HPI. Vital Signs:    Last 24hrs VS reviewed since prior progress note. Most recent are:  Visit Vitals  /74 (BP 1 Location: Left arm, BP Patient Position: At rest)   Pulse 76   Temp 98.4 °F (36.9 °C)   Resp 18   Ht 5' 10\" (1.778 m)   Wt 63.5 kg (140 lb)   SpO2 98%   BMI 20.09 kg/m²         Intake/Output Summary (Last 24 hours) at 9/11/2022 1053  Last data filed at 9/11/2022 0912  Gross per 24 hour   Intake 4008 ml   Output 101 ml   Net 3907 ml        Physical Examination:     I had a face to face encounter with this patient and independently examined them on 9/11/2022 as outlined below:          Constitutional:  No acute distress, cooperative, pleasant    ENT:  Oral mucosa moist, oropharynx benign. NGT in place to intermittent suction   Resp:  CTA bilaterally. No wheezing/rhonchi/rales. No accessory muscle use. CV:  Regular rhythm, normal rate, no murmurs, gallops, rubs     GI:  Soft, non distended, non tender. Absent bowel sounds. Musculoskeletal:  No edema, warm, 2+ pulses throughout     Neurologic:  Moves all extremities.   AAOx3, CN II-XII reviewed            Data Review:    Review and/or order of clinical lab test  Review and/or order of tests in the radiology section of CPT  Review and/or order of tests in the medicine section of CPT      Labs:     Recent Labs     09/11/22  0037 09/10/22  0224   WBC 7.8 8.7   HGB 8.3* 9.1*   HCT 24.4* 26.1*   * 139*     Recent Labs     09/11/22  0037 09/10/22  0224 09/09/22  0545    138 139   K 3.4* 3.2* 3.4*    102 103   CO2 28 30 24   BUN 17 24* 25*   CREA 0.79 1.02 0.93   * 145* 84   CA 8.0* 8.4* 9.1   MG 2.0 2.2 1.9   PHOS 2.6 2.4* 2.6     No results for input(s): ALT, AP, TBIL, TBILI, TP, ALB, GLOB, GGT, AML, LPSE in the last 72 hours. No lab exists for component: SGOT, GPT, AMYP, HLPSE  No results for input(s): INR, PTP, APTT, INREXT in the last 72 hours. No results for input(s): FE, TIBC, PSAT, FERR in the last 72 hours. No results found for: FOL, RBCF   No results for input(s): PH, PCO2, PO2 in the last 72 hours. No results for input(s): CPK, CKNDX, TROIQ in the last 72 hours.     No lab exists for component: CPKMB  Lab Results   Component Value Date/Time    Cholesterol, total 172 08/12/2017 08:46 AM    HDL Cholesterol 58 08/12/2017 08:46 AM    LDL, calculated 90 08/12/2017 08:46 AM    Triglyceride 120 08/12/2017 08:46 AM    CHOL/HDL Ratio 3.6 10/01/2012 06:45 PM     Lab Results   Component Value Date/Time    Glucose (POC) 93 09/09/2022 12:10 PM    Glucose (POC) 141 (H) 05/19/2022 10:45 AM     Lab Results   Component Value Date/Time    Color YELLOW/STRAW 09/06/2022 06:51 PM    Appearance CLEAR 09/06/2022 06:51 PM    Specific gravity <1.005 09/06/2022 06:51 PM    Specific gravity 1.022 08/30/2022 09:19 AM    pH (UA) 5.5 09/06/2022 06:51 PM    Protein Negative 09/06/2022 06:51 PM    Glucose Negative 09/06/2022 06:51 PM    Ketone Negative 09/06/2022 06:51 PM    Bilirubin Negative 09/06/2022 06:51 PM    Urobilinogen 0.2 09/06/2022 06:51 PM    Nitrites Negative 09/06/2022 06:51 PM    Leukocyte Esterase Negative 09/06/2022 06:51 PM    Epithelial cells FEW 09/06/2022 06:51 PM    Bacteria Negative 09/06/2022 06:51 PM    WBC 0-4 09/06/2022 06:51 PM    RBC 0-5 09/06/2022 06:51 PM         Medications Reviewed:     Current Facility-Administered Medications   Medication Dose Route Frequency    TPN ADULT - CENTRAL   IntraVENous CONTINUOUS    fat emulsion 20% (LIPOSYN, INTRAlipid) infusion 250 mL  250 mL IntraVENous Q24H    LORazepam (INTENSOL) 2 mg/mL oral concentrate 0.5 mg  0.5 mg Oral QHS PRN    bacitracin 500 unit/gram packet 1 Packet  1 Packet Topical PRN    gabapentin (NEURONTIN) capsule 100 mg  100 mg Oral TID    phenol throat spray (CHLORASEPTIC) 1 Spray  1 Spray Oral PRN    benzocaine-menthoL (CHLORASEPTIC MAX) lozenge 1 Lozenge  1 Lozenge Oral Q2H PRN    HYDROmorphone (DILAUDID) injection 0.5 mg  0.5 mg IntraVENous Q3H PRN    sodium chloride (NS) flush 5-40 mL  5-40 mL IntraVENous Q8H    sodium chloride (NS) flush 5-40 mL  5-40 mL IntraVENous PRN    acetaminophen (TYLENOL) tablet 650 mg  650 mg Oral Q6H PRN    Or    acetaminophen (TYLENOL) suppository 650 mg  650 mg Rectal Q6H PRN    polyethylene glycol (MIRALAX) packet 17 g  17 g Oral DAILY PRN    ondansetron (ZOFRAN ODT) tablet 4 mg  4 mg Oral Q8H PRN    Or    ondansetron (ZOFRAN) injection 4 mg  4 mg IntraVENous Q6H PRN    piperacillin-tazobactam (ZOSYN) 3.375 g in 0.9% sodium chloride (MBP/ADV) 100 mL MBP  3.375 g IntraVENous Q8H     ______________________________________________________________________  EXPECTED LENGTH OF STAY: 3d 4h  ACTUAL LENGTH OF STAY:          5                 Bharat Ramirez MD

## 2022-09-12 PROBLEM — C80.0 CARCINOMATOSIS (HCC): Status: ACTIVE | Noted: 2022-01-01

## 2022-09-12 NOTE — PROGRESS NOTES
Problem: Mobility Impaired (Adult and Pediatric)  Goal: *Acute Goals and Plan of Care (Insert Text)  Description: FUNCTIONAL STATUS PRIOR TO ADMISSION: Patient was independent and active without use of DME.    HOME SUPPORT PRIOR TO ADMISSION: The patient lived with wife and son but did not require assist.    Physical Therapy Goals  Initiated 9/7/2022  1. Patient will move from supine to sit and sit to supine  in bed with modified independence within 7 day(s). 2.  Patient will transfer from bed to chair and chair to bed with modified independence using the least restrictive device within 7 day(s). 3.  Patient will perform sit to stand with modified independence within 7 day(s). 4.  Patient will ambulate with modified independence for 300 feet with the least restrictive device within 7 day(s). 5.  Patient will ascend/descend 1 stairs with one handrail(s) with modified independence within 7 day(s). Outcome: Progressing Towards Goal   PHYSICAL THERAPY TREATMENT  Patient: Markus Osborne Sr. (78 y.o. male)  Date: 9/12/2022  Diagnosis: [facility] [K56.609] <principal problem not specified>      Precautions: Fall (NPO)  Chart, physical therapy assessment, plan of care and goals were reviewed. ASSESSMENT  Patient continues with skilled PT services and is progressing towards goals. Patient received sitting EOB and just had NG tube removed. Moving well yet slowly to walk in negro a short distance. Instructed on how to manage IV pole in order to get to bathroom vs BSC and demonstrated safe mobility. Likely close to baseline and will follow up with one more session. Current Level of Function Impacting Discharge (mobility/balance): supervision    Other factors to consider for discharge:          PLAN :  Patient continues to benefit from skilled intervention to address the above impairments. Continue treatment per established plan of care. to address goals.     Recommendation for discharge: (in order for the patient to meet his/her long term goals)  No skilled physical therapy/ follow up rehabilitation needs identified at this time. This discharge recommendation:  Has not yet been discussed the attending provider and/or case management    IF patient discharges home will need the following DME: none       SUBJECTIVE:   Patient stated I like to stay close to the bathroom.     OBJECTIVE DATA SUMMARY:   Critical Behavior:  Neurologic State: Alert, Appropriate for age  Orientation Level: Oriented X4  Cognition: Appropriate decision making, Appropriate for age attention/concentration, Appropriate safety awareness  Safety/Judgement: Awareness of environment, Fall prevention, Good awareness of safety precautions, Home safety, Insight into deficits  Functional Mobility Training:  Bed Mobility:      Per patient no issues              Transfers:  Sit to Stand: Modified independent  Stand to Sit: Modified independent                             Balance:  Sitting: Intact  Standing: Impaired; Without support  Standing - Static: Good  Standing - Dynamic : Good  Ambulation/Gait Training:  Distance (ft): 60 Feet (ft) (plus 30)     Ambulation - Level of Assistance: Supervision        Gait Abnormalities: Decreased step clearance        Base of Support: Narrowed     Speed/Sherry: Pace decreased (<100 feet/min)  Step Length: Right shortened;Left shortened         Activity Tolerance:   Good    After treatment patient left in no apparent distress:   Call bell within reach    COMMUNICATION/COLLABORATION:   The patients plan of care was discussed with: Registered nurse.      Gabe Cruz, PT   Time Calculation: 13 mins

## 2022-09-12 NOTE — PROGRESS NOTES
Problem: Falls - Risk of  Goal: *Absence of Falls  Description: Document [place] Fall Risk and appropriate interventions in the flowsheet.   Outcome: Progressing Towards Goal  Note: Fall Risk Interventions:  Mobility Interventions: Communicate number of staff needed for ambulation/transfer, Patient to call before getting OOB         Medication Interventions: Evaluate medications/consider consulting pharmacy    Elimination Interventions: Call light in reach

## 2022-09-12 NOTE — PROGRESS NOTES
Admit Date: 2022      POD * No surgery found *  * No surgery found *      Procedure:  * No surgery found *        HOSPITAL DAY:     ANTIBIOTICS:      HPI: Patient feeling better, passing bowels, minimal NG tube output looks brown, no nausea or vomiting, no abdominal pain, patient feeling much better and abdominal x-ray suggest contrast into the colon with decreased small bowel distention. Temp:  [97.3 °F (36.3 °C)-99.5 °F (37.5 °C)]   Pulse (Heart Rate):  []   BP: ()/(58-80)   Resp Rate:  [16-20]   O2 Sat (%):  [97 %-100 %]   Weight:  [63.5 kg (140 lb)-66.5 kg (146 lb 9.7 oz)]       Intake and Output:  Current Shift: No intake/output data recorded. Last three shifts: 09/10 1901 -  0700  In: 2842 [I.V.:1968]  Out: 650      Blood pressure (!) 123/58, pulse 78, temperature 98.5 °F (36.9 °C), resp. rate 17, height 5' 10\" (1.778 m), weight 63.5 kg (140 lb), SpO2 99 %. Temp (24hrs), Av.3 °F (36.8 °C), Min:98 °F (36.7 °C), Max:98.6 °F (37 °C)        Review of Systems   Constitutional:  Positive for fatigue. Gastrointestinal: Negative. All other systems reviewed and are negative. Physical Exam  Vitals and nursing note reviewed. Exam conducted with a chaperone present (Patient's son present). HENT:      Head: Normocephalic and atraumatic. Nose:      Comments: NG tube in place     Mouth/Throat:      Mouth: Mucous membranes are dry. Eyes:      Conjunctiva/sclera: Conjunctivae normal.   Cardiovascular:      Rate and Rhythm: Normal rate. Pulmonary:      Effort: Pulmonary effort is normal.   Abdominal:      General: Abdomen is flat. There is no distension. Palpations: Abdomen is soft. Tenderness: There is no abdominal tenderness. There is no guarding or rebound. Musculoskeletal:         General: Normal range of motion. Cervical back: Normal range of motion. Skin:     General: Skin is warm and dry. Neurological:      General: No focal deficit present. Mental Status: He is alert and oriented to person, place, and time. Psychiatric:         Mood and Affect: Mood normal.         Behavior: Behavior normal.         Thought Content:  Thought content normal.         Judgment: Judgment normal.       Recent Results (from the past 48 hour(s))   CBC W/O DIFF    Collection Time: 09/11/22 12:37 AM   Result Value Ref Range    WBC 7.8 4.1 - 11.1 K/uL    RBC 2.56 (L) 4.10 - 5.70 M/uL    HGB 8.3 (L) 12.1 - 17.0 g/dL    HCT 24.4 (L) 36.6 - 50.3 %    MCV 95.3 80.0 - 99.0 FL    MCH 32.4 26.0 - 34.0 PG    MCHC 34.0 30.0 - 36.5 g/dL    RDW 16.7 (H) 11.5 - 14.5 %    PLATELET 180 (L) 103 - 400 K/uL    MPV 10.3 8.9 - 12.9 FL    NRBC 0.0 0  WBC    ABSOLUTE NRBC 0.00 0.00 - 9.79 K/uL   METABOLIC PANEL, BASIC    Collection Time: 09/11/22 12:37 AM   Result Value Ref Range    Sodium 140 136 - 145 mmol/L    Potassium 3.4 (L) 3.5 - 5.1 mmol/L    Chloride 107 97 - 108 mmol/L    CO2 28 21 - 32 mmol/L    Anion gap 5 5 - 15 mmol/L    Glucose 157 (H) 65 - 100 mg/dL    BUN 17 6 - 20 MG/DL    Creatinine 0.79 0.70 - 1.30 MG/DL    BUN/Creatinine ratio 22 (H) 12 - 20      GFR est AA >60 >60 ml/min/1.73m2    GFR est non-AA >60 >60 ml/min/1.73m2    Calcium 8.0 (L) 8.5 - 10.1 MG/DL   MAGNESIUM    Collection Time: 09/11/22 12:37 AM   Result Value Ref Range    Magnesium 2.0 1.6 - 2.4 mg/dL   PHOSPHORUS    Collection Time: 09/11/22 12:37 AM   Result Value Ref Range    Phosphorus 2.6 2.6 - 4.7 MG/DL   MAGNESIUM    Collection Time: 09/12/22  1:01 AM   Result Value Ref Range    Magnesium 2.0 1.6 - 2.4 mg/dL   METABOLIC PANEL, BASIC    Collection Time: 09/12/22  1:01 AM   Result Value Ref Range    Sodium 143 136 - 145 mmol/L    Potassium 3.3 (L) 3.5 - 5.1 mmol/L    Chloride 111 (H) 97 - 108 mmol/L    CO2 27 21 - 32 mmol/L    Anion gap 5 5 - 15 mmol/L    Glucose 154 (H) 65 - 100 mg/dL    BUN 21 (H) 6 - 20 MG/DL    Creatinine 0.74 0.70 - 1.30 MG/DL    BUN/Creatinine ratio 28 (H) 12 - 20      GFR est AA >60 >60 ml/min/1.73m2    GFR est non-AA >60 >60 ml/min/1.73m2    Calcium 8.4 (L) 8.5 - 10.1 MG/DL   PHOSPHORUS    Collection Time: 09/12/22  1:01 AM   Result Value Ref Range    Phosphorus 2.3 (L) 2.6 - 4.7 MG/DL   CBC WITH AUTOMATED DIFF    Collection Time: 09/12/22  1:01 AM   Result Value Ref Range    WBC 7.6 4.1 - 11.1 K/uL    RBC 2.59 (L) 4.10 - 5.70 M/uL    HGB 8.6 (L) 12.1 - 17.0 g/dL    HCT 25.8 (L) 36.6 - 50.3 %    MCV 99.6 (H) 80.0 - 99.0 FL    MCH 33.2 26.0 - 34.0 PG    MCHC 33.3 30.0 - 36.5 g/dL    RDW 17.1 (H) 11.5 - 14.5 %    PLATELET 941 (L) 837 - 400 K/uL    MPV 10.6 8.9 - 12.9 FL    NRBC 0.0 0  WBC    ABSOLUTE NRBC 0.00 0.00 - 0.01 K/uL    NEUTROPHILS 74 32 - 75 %    LYMPHOCYTES 13 12 - 49 %    MONOCYTES 9 5 - 13 %    EOSINOPHILS 3 0 - 7 %    BASOPHILS 1 0 - 1 %    IMMATURE GRANULOCYTES 1 (H) 0.0 - 0.5 %    ABS. NEUTROPHILS 5.6 1.8 - 8.0 K/UL    ABS. LYMPHOCYTES 1.0 0.8 - 3.5 K/UL    ABS. MONOCYTES 0.7 0.0 - 1.0 K/UL    ABS. EOSINOPHILS 0.2 0.0 - 0.4 K/UL    ABS. BASOPHILS 0.0 0.0 - 0.1 K/UL    ABS. IMM. GRANS. 0.1 (H) 0.00 - 0.04 K/UL    DF AUTOMATED           XR Results (maximum last 3): Results from [place] encounter on 09/06/22    XR ABD (KUB)    Impression  Enteric contrast in the distal small bowel and colon with overall  decreased dilated bowel loops. CT Results (maximum last 3): Results from [place] encounter on 09/06/22    CT ABD PELV W CONT    Impression  New mid small bowel mechanical obstruction. Nonspecific colitis of the right  colon. No other significant change. Please refer to above findings for complete  details      MRI Results (maximum last 3): Results from [place] encounter on 04/28/22    MRI ABD W MRCP W WO CONT    Impression  1.  There are 2 pancreatic mass lesions suspicious for neoplasm with features  suggestive of adenocarcinoma with lesion in the pancreatic body abutting the  celiac axis and potentially occluding left gastric artery and likely responsible  for 5 mm dilation of pancreatic duct into the pancreatic tail. 2. 1.4 x 1.7 cm left periaortic lymph node and 1.1 x 1.6 cm right common iliac  node concerning for metastatic neoplasm, possibly prostatic in this patient with  history of prostate cancer and prostatectomy. 3. No MRI evidence of other metastatic disease. [address]      Nuclear Medicine Results (maximum last 3): Results from [facility] encounter on 10/19/16    NM BONE SCAN [address] Results (maximum last 3): [facility]    US ABD LTD    Impression  Impression:  Normal size spleen. Active Problems:    Malignant neoplasm of pancreas ([address] (5/13/2022)      SBO (small bowel obstruction) ([address] (9/6/2022)      Carcinomatosis ([address] (9/12/2022)          ASSESSMENT/PLAN  Seems to be recovering from small bowel obstruction whether from adhesions from previous prostate surgery or hernia repairs, unclear or from carcinomatosis. We will DC NG tube and begin p.o. clear liquid diet. I did discuss with patient and son, son was at bedside, that if he did not improve or worsen again we could consider surgical intervention laparoscopy or laparotomy but if this is from carcinomatosis its less likely to be a long-term benefit for surgery as often this is multifactorial and multifaceted and does not respond well to surgery. Apparently this topic is already been discussed with the patient and son, and they are at least not inclined to think about surgery for any reason at this point and hopefully he will improve.       FACE TO FACE time including review of any indicated imaging, discussion with patient, and other providers, exam and discussion with patient:   22          Minutes    END:

## 2022-09-12 NOTE — PROGRESS NOTES
Physician Progress Note      Myron Hurd  [facility] #:                  [id number]  :                       1945  ADMIT DATE:       2022 5:01 PM  [address] [place] DATE:  RESPONDING  PROVIDER #:        Gina Carter MD          QUERY TEXT:    Dear Attending,    Patient admitted with SBO. Noted documentation of Obesity with BMI of 20. In order to support the diagnosis of Obesity, please include additional clinical indicators in your documentation. Or please document if the diagnosis of Obesity has been ruled out after further study. The medical record reflects the following:  Risk Factors:  SBO, pmhx stage IV pancreatic cancer  Clinical Indicators: BMI 20, Nausea and vomiting x1 week  Treatment: TPN, NGTube, Oncology consulted. Palliative care consulted, Counseled on weight loss, dieting and exercise    Please call if you have any questions or need assistance. I can also be reached via Perfect [employer]. Thank you,  You Collins, [facility]  O: [phone]  Options provided:  -- Obesity present as evidenced by BMI of ##please specify, please specify. -- Obesity was ruled out  -- Other - I will add my own diagnosis  -- Disagree - Not applicable / Not valid  -- Disagree - Clinically unable to determine / Unknown  -- Refer to Clinical Documentation Reviewer    PROVIDER RESPONSE TEXT:    Obesity was ruled out after study. Query created by:  Emily Armendariz on 2022 8:21 AM      Electronically signed by:  Gina Carter MD 2022 3:39 PM

## 2022-09-12 NOTE — PROGRESS NOTES
[address] [facility] Adult  Hospitalist Group                                                                                          Hospitalist Progress Note  Jeni Shea MD  Answering service: [phone] or [phone] from in house phone        Date of Service:  2022  NAME:  Carmen Whipple  :  1945  MRN:  [id number]      Admission Summary:   67 y.o. male with history of stage IV pancreatic cancer, history of prostate cancer, hypertension, dyslipidemia who presents to hospital with complaints of  Nausea and vomiting x1 week. .  Reports near daily bowel movements but has not had one in last 3 days. Aching, generalized, nonradiating abdominal pain which is since resolved since placement of NG tube. Associated nausea without emesis. Interval history / Subjective: Follow SBO  Feeling better  NG came out today  No abd pain     Assessment & Plan:     Small bowel obstruction- persistent, and worse today   -Suspected mechanical obstruction in setting of pancreatic cancer  -Conservative management   -General surgery following, suspect will not get better without surgery   -continue TPN and lipids, at goal   -Started clears, if does not improve, the patient might needs surgery     History of stage IV pancreatic cancer  -Oncology consulted. Recommend conservative management  -Appreciate oncology/palliative care. Will continue to follow along    History of prostate cancer s/p radiation: stable     Thrombocytopenia/anemia sec to ?chemo, no heparin products, check HIT ab  Hiccups:continue gabapentin   Acute colitis: continue empiric Zosyn.  Send stool studies if obtainable  Hypertension: Hold BP lowering agents given labile Bps  RENETTA, Hyponatremia, and Dehydration: s/p IVFs  NIDDM II: SSI +Hypoglycemic protocols  Obesity: Counseled on weight loss, dieting and exercise      Code status: FULL, appreciate palliative care  Prophylaxis: SCDs    Plan: Clears, continue TPN today, likely discharge tomorrow  Care Plan discussed with: pt, RN  Anticipated Disposition: HHPT when ready, needs return of bowel function     Hospital Problems  Date Reviewed: 8/30/2022            Codes Class Noted POA    Carcinomatosis ([facility] ICD-10-CM: C80.0  ICD-9-CM: 199.0  9/12/2022 Unknown        SBO (small bowel obstruction) ([facility] ICD-10-CM: E66.089  ICD-9-CM: 560.9  9/6/2022 Unknown        Malignant neoplasm of pancreas ([facility] ICD-10-CM: C25.9  ICD-9-CM: 157.9  5/13/2022 Yes           Review of Systems:   A comprehensive review of systems was negative except for that written in the HPI. Vital Signs:    Last 24hrs VS reviewed since prior progress note. Most recent are:  Visit Vitals  BP (!) 123/58 (BP 1 Location: Left lower arm, BP Patient Position: At rest)   Pulse 78   Temp 98.5 °F (36.9 °C)   Resp 17   Ht 5' 10\" (1.778 m)   Wt 63.5 kg (140 lb)   SpO2 99%   BMI 20.09 kg/m²         Intake/Output Summary (Last 24 hours) at 9/12/2022 1359  Last data filed at 9/11/2022 2248  Gross per 24 hour   Intake 881 ml   Output 450 ml   Net 431 ml          Physical Examination:     I had a face to face encounter with this patient and independently examined them on 9/12/2022 as outlined below:          Constitutional:  No acute distress, cooperative, pleasant    ENT:  Oral mucosa moist, oropharynx benign. NGT in place to intermittent suction   Resp:  CTA bilaterally. No wheezing/rhonchi/rales. No accessory muscle use. CV:  Regular rhythm, normal rate, no murmurs, gallops, rubs     GI:  Soft, non distended, non tender. Absent bowel sounds. Musculoskeletal:  No edema, warm, 2+ pulses throughout     Neurologic:  Moves all extremities.   AAOx3, CN II-XII reviewed            Data Review:    Review and/or order of clinical lab test  Review and/or order of tests in the radiology section of CPT  Review and/or order of tests in the medicine section of CPT      Labs:     Recent Labs     09/12/22  0101 09/11/22  0037   WBC 7.6 7.8   HGB 8.6* 8.3*   HCT 25.8* 24.4*   * 115*       Recent Labs     09/12/22  0101 09/11/22  0037 09/10/22  0224    140 138   K 3.3* 3.4* 3.2*   * 107 102   CO2 27 28 30   BUN 21* 17 24*   CREA 0.74 0.79 1.02   * 157* 145*   CA 8.4* 8.0* 8.4*   MG 2.0 2.0 2.2   PHOS 2.3* 2.6 2.4*       No results for input(s): ALT, AP, TBIL, TBILI, TP, ALB, GLOB, GGT, AML, LPSE in the last 72 hours. No lab exists for component: SGOT, GPT, AMYP, HLPSE  No results for input(s): INR, PTP, APTT, INREXT, INREXT in the last 72 hours. No results for input(s): FE, TIBC, PSAT, FERR in the last 72 hours. No results found for: FOL, RBCF   No results for input(s): PH, PCO2, PO2 in the last 72 hours. No results for input(s): CPK, CKNDX, TROIQ in the last 72 hours.     No lab exists for component: CPKMB  Lab Results   Component Value Date/Time    Cholesterol, total 172 08/12/2017 08:46 AM    HDL Cholesterol 58 08/12/2017 08:46 AM    LDL, calculated 90 08/12/2017 08:46 AM    Triglyceride 120 08/12/2017 08:46 AM    CHOL/HDL Ratio 3.6 10/01/2012 06:45 PM     Lab Results   Component Value Date/Time    Glucose (POC) 93 09/09/2022 12:10 PM    Glucose (POC) 141 (H) 05/19/2022 10:45 AM     Lab Results   Component Value Date/Time    Color YELLOW/STRAW 09/06/2022 06:51 PM    Appearance CLEAR 09/06/2022 06:51 PM    Specific gravity <1.005 09/06/2022 06:51 PM    Specific gravity 1.022 08/30/2022 09:19 AM    pH (UA) 5.5 09/06/2022 06:51 PM    Protein Negative 09/06/2022 06:51 PM    Glucose Negative 09/06/2022 06:51 PM    Ketone Negative 09/06/2022 06:51 PM    Bilirubin Negative 09/06/2022 06:51 PM    Urobilinogen 0.2 09/06/2022 06:51 PM    Nitrites Negative 09/06/2022 06:51 PM    Leukocyte Esterase Negative 09/06/2022 06:51 PM    Epithelial cells FEW 09/06/2022 06:51 PM    Bacteria Negative 09/06/2022 06:51 PM    WBC 0-4 09/06/2022 06:51 PM    RBC 0-5 09/06/2022 06:51 PM         Medications Reviewed:     Current Facility-Administered Medications   Medication Dose Route Frequency    TPN ADULT - CENTRAL   IntraVENous CONTINUOUS    TPN ADULT - CENTRAL   IntraVENous CONTINUOUS    fat emulsion 20% (LIPOSYN, INTRAlipid) infusion 250 mL  250 mL IntraVENous Q24H    LORazepam (INTENSOL) 2 mg/mL oral concentrate 0.5 mg  0.5 mg Oral QHS PRN    bacitracin 500 unit/gram packet 1 Packet  1 Packet Topical PRN    gabapentin (NEURONTIN) capsule 100 mg  100 mg Oral TID    phenol throat spray (CHLORASEPTIC) 1 Spray  1 Spray Oral PRN    benzocaine-menthoL (CHLORASEPTIC MAX) lozenge 1 Lozenge  1 Lozenge Oral Q2H PRN    HYDROmorphone (DILAUDID) injection 0.5 mg  0.5 mg IntraVENous Q3H PRN    sodium chloride (NS) flush 5-40 mL  5-40 mL IntraVENous Q8H    sodium chloride (NS) flush 5-40 mL  5-40 mL IntraVENous PRN    acetaminophen (TYLENOL) tablet 650 mg  650 mg Oral Q6H PRN    Or    acetaminophen (TYLENOL) suppository 650 mg  650 mg Rectal Q6H PRN    polyethylene glycol (MIRALAX) packet 17 g  17 g Oral DAILY PRN    ondansetron (ZOFRAN ODT) tablet 4 mg  4 mg Oral Q8H PRN    Or    ondansetron (ZOFRAN) injection 4 mg  4 mg IntraVENous Q6H PRN    piperacillin-tazobactam (ZOSYN) 3.375 g in 0.9% sodium chloride (MBP/ADV) 100 mL MBP  3.375 g IntraVENous Q8H     ______________________________________________________________________  EXPECTED LENGTH OF STAY: 3d 4h  ACTUAL LENGTH OF STAY:          Lyssa Kelly MD

## 2022-09-13 ENCOUNTER — APPOINTMENT (OUTPATIENT)
Dept: INFUSION THERAPY | Age: 77
End: 2022-09-13

## 2022-09-13 ENCOUNTER — HOSPITAL ENCOUNTER (OUTPATIENT)
Dept: INFUSION THERAPY | Age: 77
End: 2022-09-13

## 2022-09-13 PROCEDURE — 77030041376 HC FLTR VENTLTR EXPTRY MEDT -B

## 2022-09-13 RX ORDER — SODIUM BICARBONATE 1 MEQ/ML
SYRINGE (ML) INTRAVENOUS
Status: COMPLETED | OUTPATIENT
Start: 2022-01-01 | End: 2022-01-01

## 2022-09-13 RX ORDER — EPINEPHRINE 0.1 MG/ML
INJECTION INTRACARDIAC; INTRAVENOUS
Status: COMPLETED | OUTPATIENT
Start: 2022-01-01 | End: 2022-01-01

## 2022-09-13 RX ORDER — DEXTROSE 50 % IN WATER (D50W) INTRAVENOUS SYRINGE
Status: COMPLETED | OUTPATIENT
Start: 2022-01-01 | End: 2022-01-01

## 2022-09-13 NOTE — PROGRESS NOTES
Spiritual Care Assessment/Progress Note  [address]. [address]      NAME: Anjelica Gomez Sr. MRN: [id number]  AGE: 67 y.o.  SEX: male  Druze Affiliation: Quaker   Language: English     2022     Total Time (in minutes): 760     Spiritual Assessment begun in [address] through conversation with:         []Patient        [x] Family    [] Friend(s)        Reason for Consult: Death, Inpatient     Spiritual beliefs: (Please include comment if needed)     [x] Identifies with a faizan tradition:         [] Supported by a faizan community:            [] Claims no spiritual orientation:           [] Seeking spiritual identity:                [] Adheres to an individual form of spirituality:           [] Not able to assess:                           Identified resources for coping:      [x] Prayer                               [] Music                  [] Guided Imagery     [x] Family/friends                 [] Pet visits     [] Devotional reading                         [] Unknown     [] Other:                                             Interventions offered during this visit: (See comments for more details)    Patient Interventions: Affirmation of emotions/emotional suffering, Catharsis/review of pertinent events in supportive environment, Iconic (affirming the presence of God/Higher Power), Initial/Spiritual assessment, patient floor, Prayer (actual), Prayer (assurance of)     Family/Friend(s): /memorial planning, Normalization of emotional/spiritual concerns, Prayer (actual), Prayer (assurance of), Druze beliefs/image of God discussed     Plan of Care:     [] Support spiritual and/or cultural needs    [] Support AMD and/or advance care planning process      [] Support grieving process   [] Coordinate Rites and/or Rituals    [] Coordination with community clergy   [] No spiritual needs identified at this time   [] Detailed Plan of Care below (See Comments)  [] Make referral to Music Therapy  [] Make referral to Pet Therapy     [] Make referral to Addiction services  [] Make referral to [facility]  [] Make referral to Spiritual Care Partner  [] No future visits requested        [] Contact Spiritual Care for further referrals     Comments Pt family present at bedside. Normalization of emotions. Family are people of faizan. Offered a prayer and gave assurance of prayer. [address] [facility], [place]. [address] (1925)

## 2022-09-13 NOTE — DISCHARGE SUMMARY
Discharge Summary     Patient: Cony Haddad Sr. MRN: [id number]       YOB: 1945       Age: 67 y.o. Date of admission:  2022    Date of discharge:  2022    Primary care provider:  Karishma Angelo MD     Admitting provider:  Reji Sheets MD    Discharging provider(s): Marielos Baltazar, NP - Staff [address]     Consultations  IP CONSULT TO GENERAL SURGERY  IP CONSULT TO HOSPITALIST  IP CONSULT TO ONCOLOGY  IP CONSULT TO PALLIATIVE CARE - PROVIDER  IP CONSULT TO INTENSIVIST      Discharge Condition: . Discharge Destination: [facility]. Admission diagnosis  SBO (small bowel obstruction) ([facility] [K56.609]    Please refer to the admission history and physical for details on the presenting problem. Final discharge diagnoses and brief hospital course    Ana Hernandez was a 68 yo male with pmhx of stage IV pancreatic cancer, prostate cancer, HTN, HLD, who was admitted  for SBO. He was managed medically on the floor and was improving. NGT was removed today and his diet was advanced. This evening while on the commode he went into SVT. He was hypotensive, but became normotensive after IVF bolus. Rapid response was called and adenosine was administered x 2 with no improvement. He was started on amio gtt after a bolus and was to be transferred to [facility]. Prior to transfer, CT imaging was ordered after labs showed worsening lactic acidosis and leukocytosis. While in CT he went into cardiac arrest.  He was intubated and ROSC was achieved after 2 rounds of CPR. He was then transferred to the ICU. Shortly after arrival to ICU he was noted to have bradycardia and went into asystole. CPR was initiated (refer to code documentation for details) again. Family was contacted and at the bedside. After several round of CPR without ROSC, the resuscitation efforts were stopped. TOD 2340.     Chronic Diagnoses:    Problem List as of 2022 Date Reviewed: 8/30/2022            Codes Class Noted - Resolved    Carcinomatosis ([facility] ICD-10-CM: C80.0  ICD-9-CM: 199.0  9/12/2022 - Present        SBO (small bowel obstruction) ([facility] ICD-10-CM: O96.248  ICD-9-CM: 560.9  9/6/2022 - Present        Chronic ITP (idiopathic thrombocytopenia) (H[facility]) ICD-10-CM: D69.3  ICD-9-CM: 287.31  6/7/2022 - Present        Malignant neoplasm of pancreas ([facility]) ICD-10-CM: C25.9  ICD-9-CM: 157.9  5/13/2022 - Present        Impaired fasting glucose ICD-10-CM: R73.01  ICD-9-CM: 790.21  3/12/2014 - Present        Constipation ICD-10-CM: K59.00  ICD-9-CM: 564.00  10/22/2012 - Present        Pancreatitis, acute ICD-10-CM: K85.90  ICD-9-CM: 892.5  10/1/2012 - Present        Prostate cancer ([facility] ICD-10-CM: C61  ICD-9-CM: 197  4/4/2011 - Present        Erectile dysfunction ICD-10-CM: N52.9  ICD-9-CM: 607.84  4/4/2011 - Present        Thrombocytopenia ([facility] ICD-10-CM: D69.6  ICD-9-CM: 287.5  7/27/2010 - Present    Overview Signed 7/27/2010  2:01 PM by Epi Gill MD     Platelets in the 17K range, nl bone marrow bx, followed by hematologist Dr Daria Kitchen             Hypertension ICD-10-CM: I10  ICD-9-CM: 401.9  Unknown - Present        Hypercholesterolemia ICD-10-CM: E78.00  ICD-9-CM: 272.0  Unknown - Present        Claudication ([facility] ICD-10-CM: I73.9  ICD-9-CM: 443.9  Unknown - Present           Time spent on discharge related activities today greater than 30 minutes. Signed:      Jaren Khan NP   Staff Intensivist  [facility]    9/12/2022   11:49 PM     Bebeto Reilly MD   Attending        Cc:  Harry Hudson MD

## 2022-09-13 NOTE — PROGRESS NOTES
0000: Pt arrived on unit at approx 2315 for urgent transfer post cardiac arrest in CT; RN awaiting report to be called; upon arrival pupils noted fixed and dilated; pt is intubated and on ventilator; unable to obtain O2 SATs, RT at bedside to draw ABG; NP at bedside orders given for bicarb; rhythm change noted on monitor, as EKG was being obtained patient experienced episode of bradycardia and asystole noted on monitor; Code Blue was then called at [phone] (see code documentation).

## 2022-09-13 NOTE — PROGRESS NOTES
Hospitalist Code Blue Documentation    Event: A Code Blue was called overhead for Mr. Tequila Hollins at ~22:40PM in the [facility] in the ED. I responded immediately. Per report, he was doing well until he was laid flat for the CT Scan. This prompted him to vomit and aspirate and subsequently lost his pulse. Chest compressions were initiated immediately. He received 2 doses of Epinephrine and ROSC was achieved. He was intubated. Please the see nursing documentation of timing for complete events. Dr. Paulette Holbrook was at bedside prior to my arrival. The ICU team was also there at bedside and confirms to be transferred to ICU. I called and updated his son/HCP, Lavern Chowdhury, and discussed that Code Event and transfer to ICU. All questions and concerns addressed to best of my ability.     Jonah Kim PA-C

## 2022-09-13 NOTE — PROGRESS NOTES
Patient complaining of feeling unwell. Staff and RN entered the room, vitals signs taken, unable to get oxygen status, BP 67/70, rapid response called for change in condition. Rapid Response team arrived, ekg, lab work, chest xray ordered. Patient found to be in svt on ekg. Patient to be transferred to telemetry for amiodorone drip. 1018 Patient lactic acid 10.0 and CO2 of 11. MD notified. Orders given to hold TPN for 1 hour and to redraw labs.   Blanca Saenz RN

## 2022-09-13 NOTE — PROCEDURES
[facility]      Procedure Note - Intubation:   Performed by Cyrus Oviedo MD .     Immediately prior to the procedure, the patient was reevaluated and found suitable for the planned procedure and any planned medications. Immediately prior to the procedure a time out was called to verify the correct patient, procedure, equipment, staff, and marking as appropriate. Medications given were none. A number 7.5 cuffed   ETT was placed to 24 cm at the gum. Placement was evaluated by noting bilateral, symmetric breath sounds, good end-tidal CO2 detector color change , and no breath sounds over stomach. Attempts required: 1. Complications: none. Dentures were removed prior to procedure. .  The procedure was tolerated well.

## 2022-09-13 NOTE — PROGRESS NOTES
Hospitalist Rapid Response Documentation    Event: Was overhead paged at 2001 for rapid response for the patient. I responded to the bedside immediately. On arrival the report the patient was lightheaded when ambulating back to the bed from the commode and was unwell appearing. Vital signs on monitor: , BP 73/52, and on 4L NC. On Exam:  -- General: Unwell appearing, pale and tachypneic  -- PULM: CTAB  -- CV: Faint heart sounds, rapid rate. Palpable pulse, but faint in radial and dorsalis pedis bilaterally  -- Abd: Soft, nontender, nondistended  -- Skin: RUE PICC site is c/d/I    EKG: Tachycardic to rates of 153bpm. Difficult to discern P waves. Narrow complex. KY 122ms, QRs 82ms, QTc 443ms. Radiology: CXR reviewed instantly with minimal bibasilar atelectasis, no PTX, and no shift in mediastinum. No free air below the diaphragm. Awaiting Radiology review. Interventions: He was given a NS 500cc bolus immediately and laid flat. This improved his BP to 075-498'W systolic, but without any improvement in his heart rate. Concern he was in SVT. Due to lack of improvement with fluid bolus the decision was made to pursue Adenosine. Prior to administration he was placed on tele and cardiac pads were placed on the patient and the supervising MD, Dr. Gopi Sawyer, was notified. He  was given Adenosine 6mg at 2030 which improved his heart rates momentarily to the 130's but returned to 150-160's. Repeat EKG after 6mg of Adenosine redemonstrated concern for SVT. He was then given 12mg at 2034 which improved his heart rates to the 70's, but only lasting for few seconds and was unable to be captured. He was then ordered for Amiodarone 150mg bolus + gtt. While awaiting for Amiodarone bolus he was given an additional 500cc bolus of NS with no improvement in his HR. The Amiodarone bolus was administered at 2115 with improvement in HR to the 120-130's.     I confirmed he was Full Code with his son/HCP, Tiana Kramer, at bedside    The ICU team was consulted by Dr. Beth Campbell. They were at bedside and the case was reviewed with them. They agreed with the plans and plan Amiodarone. Labs: His first set of labs drawn at the beginning of the rapid were hemolyzed and unable to be run. A second set of labs were re-drawn but with significant abnormalities including rapid rise in all cell lines, CO2 of 11, glucose of 402, SCr up to 1.40, and lactic acid of 10.5 (from prior of 0.8). I discussed with the nurse and will be repeating all labs again. TPN To be Turned off as drawing labs through the PICC line. ABG ordered. Assessment: Mr. Leighann Hughes is a 77M with Stage IV Pancreatic Cancer admitted with SBO for which a RRT was called overnight 9/12PM due to concern for SVT. He received Adenosine x 2 without conversion and now on Amiodarone. -- Plan: Continue Amiodarone gtt. . Team to calculate in the morning total dosing and dosing needed to complete load. -- STAT Repeat ALL Labs including lactate  -- CT PA and CT A/P ordered    Myself, Dr. Beth Campbell, and the ICU team all updated family at bedside. Addendum: He has been placed for transfer for escalation of care to [facility]    CRITICAL CARE ATTESTATION:  I had a face to face encounter with the patient, reviewed and interpreted patient data including clinical events, labs, images, vital signs, I/O's, and examined patient. I have discussed the case and the plan and management of the patient's care with the consulting services, the bedside nurses and necessary ancillary providers. NOTE OF PERSONAL INVOLVEMENT IN CARE   This patient has a high probability of imminent, clinically significant deterioration, which requires the highest level of preparedness to intervene urgently.  I participated in the decision-making and personally managed or directed the management of the following life and organ supporting interventions that required my frequent assessment to treat or prevent imminent deterioration. I personally spent 60 minutes of critical care time. This is time spent at this critically ill patient's bedside actively involved in patient care as well as the coordination of care and discussions with the patient's family. This does not include any procedural time which has been billed separately.

## 2022-09-13 NOTE — PROGRESS NOTES
Rapid Response called 2002    RRT arrived 2005    Rapid called because patient became unresponsive with family while on bedside commode. Upon getting him back to bed patient was hypotensive and tachycardic. BP: 84/47  HR: 155    PA at beside: Labs, EKG and 500mL bolus ordered    After EKG, pt was determined to be in SVT    2030 - Adenosine 6 mg given  No changes  2034 - Adenosine 12 mg given  HR in 80s for approx 6 seconds then back to the 150s    Amiodarone bolus and drip ordered, pharmacy called to be made aware to send to the floor. Transfer orders put in for intermediate care. RRT stayed at bedside awaiting room assignment. CT ordered based on critical labs. 2230 - Pt to CT with RRT and RNx2,  VSS     2241 - Pt laid flat in CT, began vomiting, became unresponsive, no pulse, CPR initiated, Code Blue called to CT    2242  - epi in     2244 - pulse check, no pulse, PEA, CPR resumed    2245 - 2nd epi in     2246 - PT intubated by Rob Ordonez MD 7.5 ETT 23\" at the gum, bicarb in     2247 pulse check, pulse back, sinus tach  HR - 130 BP - 124/62.  2nd bicarb in     PT transferred to ICU with RRT, ICU RN, RT, Pharmacy to ICU Rm 10

## 2022-09-13 NOTE — DEATH NOTE
Death Declaration         [address]    Pt Name  Anton Torres Sr. Admit date:  9/6/2022   Date and time of death:  9/12/22 @ [address]   Room Number  7110/01    Medical Record Number  [id number] @ . [facility] hospital   Age  67 y.o. Date of Birth 1945   PCP Toby Navarrete MD   Attending physician Lynelle Goodell, MD      Code Status  Full Code    Patient seen and examined     Mental status   Unresponsive    Pupils Dilated and Fixed    Respiration Nil    Pulse  Absent     Heart Sounds  Absent    Rhythm  Asystole   Family  Notified by Nursing staff    [facility] Service  Notified by Nursing staff     Death certificate and discharge summary completion remain [facility] responsibility.      Eloisa Kerns AGACNP-BC                              9/12/2022

## 2022-09-15 ENCOUNTER — APPOINTMENT (OUTPATIENT)
Dept: INFUSION THERAPY | Age: 77
End: 2022-09-15

## 2022-09-15 LAB
ATRIAL RATE: 153 BPM
CALCULATED P AXIS, ECG09: 27 DEGREES
CALCULATED R AXIS, ECG10: -14 DEGREES
CALCULATED T AXIS, ECG11: 116 DEGREES
DIAGNOSIS, 93000: NORMAL
P-R INTERVAL, ECG05: 122 MS
Q-T INTERVAL, ECG07: 278 MS
QRS DURATION, ECG06: 82 MS
QTC CALCULATION (BEZET), ECG08: 443 MS
VENTRICULAR RATE, ECG03: 153 BPM

## 2022-09-20 ENCOUNTER — APPOINTMENT (OUTPATIENT)
Dept: INFUSION THERAPY | Age: 77
End: 2022-09-20

## 2022-09-21 ENCOUNTER — APPOINTMENT (OUTPATIENT)
Dept: INFUSION THERAPY | Age: 77
End: 2022-09-21

## 2022-09-22 ENCOUNTER — APPOINTMENT (OUTPATIENT)
Dept: INFUSION THERAPY | Age: 77
End: 2022-09-22

## 2022-09-23 ENCOUNTER — APPOINTMENT (OUTPATIENT)
Dept: INFUSION THERAPY | Age: 77
End: 2022-09-23

## 2022-09-27 ENCOUNTER — APPOINTMENT (OUTPATIENT)
Dept: INFUSION THERAPY | Age: 77
End: 2022-09-27

## 2022-09-29 ENCOUNTER — APPOINTMENT (OUTPATIENT)
Dept: INFUSION THERAPY | Age: 77
End: 2022-09-29

## 2022-10-04 ENCOUNTER — APPOINTMENT (OUTPATIENT)
Dept: INFUSION THERAPY | Age: 77
End: 2022-10-04

## 2022-10-05 ENCOUNTER — APPOINTMENT (OUTPATIENT)
Dept: INFUSION THERAPY | Age: 77
End: 2022-10-05

## 2022-10-06 ENCOUNTER — APPOINTMENT (OUTPATIENT)
Dept: INFUSION THERAPY | Age: 77
End: 2022-10-06

## 2022-10-07 ENCOUNTER — APPOINTMENT (OUTPATIENT)
Dept: INFUSION THERAPY | Age: 77
End: 2022-10-07

## (undated) DEVICE — TUBING HYDR IRR --

## (undated) DEVICE — SYSTEM BX DIA22GA FN W/ PRE LD NDL SHARKCORE